# Patient Record
Sex: FEMALE | Race: BLACK OR AFRICAN AMERICAN | NOT HISPANIC OR LATINO
[De-identification: names, ages, dates, MRNs, and addresses within clinical notes are randomized per-mention and may not be internally consistent; named-entity substitution may affect disease eponyms.]

---

## 2017-12-18 ENCOUNTER — RESULT REVIEW (OUTPATIENT)
Age: 77
End: 2017-12-18

## 2017-12-18 ENCOUNTER — INPATIENT (INPATIENT)
Facility: HOSPITAL | Age: 77
LOS: 0 days | Discharge: HOME | DRG: 252 | End: 2017-12-19
Attending: SURGERY | Admitting: SURGERY
Payer: MEDICARE

## 2017-12-18 VITALS
HEART RATE: 79 BPM | HEIGHT: 65 IN | TEMPERATURE: 96 F | OXYGEN SATURATION: 100 % | DIASTOLIC BLOOD PRESSURE: 74 MMHG | SYSTOLIC BLOOD PRESSURE: 160 MMHG | RESPIRATION RATE: 16 BRPM | WEIGHT: 168.87 LBS

## 2017-12-18 DIAGNOSIS — E87.5 HYPERKALEMIA: ICD-10-CM
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
## 2017-12-18 DIAGNOSIS — T82.590A OTHER MECHANICAL COMPLICATION OF SURGICALLY CREATED ARTERIOVENOUS FISTULA, INITIAL ENCOUNTER: ICD-10-CM

## 2017-12-18 DIAGNOSIS — E11.40 TYPE 2 DIABETES MELLITUS WITH DIABETIC NEUROPATHY, UNSPECIFIED: ICD-10-CM

## 2017-12-18 DIAGNOSIS — D62 ACUTE POSTHEMORRHAGIC ANEMIA: ICD-10-CM

## 2017-12-18 DIAGNOSIS — N18.6 END STAGE RENAL DISEASE: ICD-10-CM

## 2017-12-18 DIAGNOSIS — E78.5 HYPERLIPIDEMIA, UNSPECIFIED: ICD-10-CM

## 2017-12-18 DIAGNOSIS — E11.22 TYPE 2 DIABETES MELLITUS WITH DIABETIC CHRONIC KIDNEY DISEASE: ICD-10-CM

## 2017-12-18 DIAGNOSIS — I12.0 HYPERTENSIVE CHRONIC KIDNEY DISEASE WITH STAGE 5 CHRONIC KIDNEY DISEASE OR END STAGE RENAL DISEASE: ICD-10-CM
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
## 2017-12-18 DIAGNOSIS — Z41.9 ENCOUNTER FOR PROCEDURE FOR PURPOSES OTHER THAN REMEDYING HEALTH STATE, UNSPECIFIED: Chronic | ICD-10-CM

## 2017-12-18 LAB
BASE EXCESS BLDA CALC-SCNC: -1.9 MMOL/L — SIGNIFICANT CHANGE UP (ref -2–3)
CA-I BLDA-SCNC: 0.92 MMOL/L — LOW (ref 1.12–1.3)
COHGB MFR BLDA: 0.8 % — SIGNIFICANT CHANGE UP
GAS PNL BLDA: SIGNIFICANT CHANGE UP
GLUCOSE BLDC GLUCOMTR-MCNC: 254 MG/DL — HIGH (ref 70–99)
GLUCOSE BLDC GLUCOMTR-MCNC: 292 MG/DL — HIGH (ref 70–99)
GLUCOSE BLDC GLUCOMTR-MCNC: 314 MG/DL — HIGH (ref 70–99)
HCO3 BLDA-SCNC: 23 MMOL/L — SIGNIFICANT CHANGE UP (ref 21–28)
HGB BLDA-MCNC: 8.8 G/DL — LOW (ref 11.5–15.5)
METHGB MFR BLDA: 0.2 % — SIGNIFICANT CHANGE UP
O2 CT VFR BLDA CALC: SIGNIFICANT CHANGE UP (ref 15–23)
OXYHGB MFR BLDA: 70 % — LOW (ref 94–100)
PCO2 BLDA: 42 MMHG — SIGNIFICANT CHANGE UP (ref 32–45)
PH BLDA: 7.36 — SIGNIFICANT CHANGE UP (ref 7.35–7.45)
PO2 BLDA: 42 MMHG — CRITICAL LOW (ref 83–108)
POTASSIUM BLDA-SCNC: 4.5 MMOL/L — SIGNIFICANT CHANGE UP (ref 3.5–4.9)
POTASSIUM BLDV-SCNC: 4.8 MMOL/L — SIGNIFICANT CHANGE UP (ref 3.5–4.9)
SAO2 % BLDA: 70 % — LOW (ref 95–100)
SODIUM BLDA-SCNC: 135 MMOL/L — LOW (ref 138–146)

## 2017-12-18 RX ORDER — PREGABALIN 225 MG/1
1000 CAPSULE ORAL DAILY
Qty: 0 | Refills: 0 | Status: DISCONTINUED | OUTPATIENT
Start: 2017-12-18 | End: 2017-12-19

## 2017-12-18 RX ORDER — CLOPIDOGREL BISULFATE 75 MG/1
75 TABLET, FILM COATED ORAL DAILY
Qty: 0 | Refills: 0 | Status: DISCONTINUED | OUTPATIENT
Start: 2017-12-18 | End: 2017-12-19

## 2017-12-18 RX ORDER — INSULIN GLARGINE 100 [IU]/ML
20 INJECTION, SOLUTION SUBCUTANEOUS AT BEDTIME
Qty: 0 | Refills: 0 | Status: DISCONTINUED | OUTPATIENT
Start: 2017-12-18 | End: 2017-12-19

## 2017-12-18 RX ORDER — INSULIN LISPRO 100/ML
VIAL (ML) SUBCUTANEOUS
Qty: 0 | Refills: 0 | Status: DISCONTINUED | OUTPATIENT
Start: 2017-12-18 | End: 2017-12-19

## 2017-12-18 RX ORDER — CARVEDILOL PHOSPHATE 80 MG/1
6.25 CAPSULE, EXTENDED RELEASE ORAL EVERY 12 HOURS
Qty: 0 | Refills: 0 | Status: DISCONTINUED | OUTPATIENT
Start: 2017-12-18 | End: 2017-12-19

## 2017-12-18 RX ORDER — SODIUM CHLORIDE 9 MG/ML
1000 INJECTION, SOLUTION INTRAVENOUS
Qty: 0 | Refills: 0 | Status: DISCONTINUED | OUTPATIENT
Start: 2017-12-18 | End: 2017-12-19

## 2017-12-18 RX ORDER — THIAMINE MONONITRATE (VIT B1) 100 MG
100 TABLET ORAL DAILY
Qty: 0 | Refills: 0 | Status: DISCONTINUED | OUTPATIENT
Start: 2017-12-18 | End: 2017-12-19

## 2017-12-18 RX ORDER — INSULIN LISPRO 100/ML
6 VIAL (ML) SUBCUTANEOUS ONCE
Qty: 0 | Refills: 0 | Status: COMPLETED | OUTPATIENT
Start: 2017-12-18 | End: 2017-12-18

## 2017-12-18 RX ORDER — DOCUSATE SODIUM 100 MG
100 CAPSULE ORAL THREE TIMES A DAY
Qty: 0 | Refills: 0 | Status: DISCONTINUED | OUTPATIENT
Start: 2017-12-18 | End: 2017-12-19

## 2017-12-18 RX ORDER — GLUCAGON INJECTION, SOLUTION 0.5 MG/.1ML
1 INJECTION, SOLUTION SUBCUTANEOUS ONCE
Qty: 0 | Refills: 0 | Status: DISCONTINUED | OUTPATIENT
Start: 2017-12-18 | End: 2017-12-19

## 2017-12-18 RX ORDER — PARICALCITOL 2 UG/1
4 CAPSULE, GELATIN COATED ORAL
Qty: 0 | Refills: 0 | Status: DISCONTINUED | OUTPATIENT
Start: 2017-12-18 | End: 2017-12-19

## 2017-12-18 RX ORDER — OXYCODONE AND ACETAMINOPHEN 5; 325 MG/1; MG/1
1 TABLET ORAL EVERY 6 HOURS
Qty: 0 | Refills: 0 | Status: DISCONTINUED | OUTPATIENT
Start: 2017-12-18 | End: 2017-12-19

## 2017-12-18 RX ORDER — ACETAMINOPHEN 500 MG
650 TABLET ORAL EVERY 6 HOURS
Qty: 0 | Refills: 0 | Status: DISCONTINUED | OUTPATIENT
Start: 2017-12-18 | End: 2017-12-19

## 2017-12-18 RX ORDER — DEXTROSE 50 % IN WATER 50 %
1 SYRINGE (ML) INTRAVENOUS ONCE
Qty: 0 | Refills: 0 | Status: DISCONTINUED | OUTPATIENT
Start: 2017-12-18 | End: 2017-12-19

## 2017-12-18 RX ORDER — SEVELAMER CARBONATE 2400 MG/1
1600 POWDER, FOR SUSPENSION ORAL
Qty: 0 | Refills: 0 | Status: DISCONTINUED | OUTPATIENT
Start: 2017-12-18 | End: 2017-12-19

## 2017-12-18 RX ORDER — ONDANSETRON 8 MG/1
4 TABLET, FILM COATED ORAL EVERY 6 HOURS
Qty: 0 | Refills: 0 | Status: DISCONTINUED | OUTPATIENT
Start: 2017-12-18 | End: 2017-12-19

## 2017-12-18 RX ORDER — CINACALCET 30 MG/1
30 TABLET, FILM COATED ORAL DAILY
Qty: 0 | Refills: 0 | Status: DISCONTINUED | OUTPATIENT
Start: 2017-12-18 | End: 2017-12-19

## 2017-12-18 RX ORDER — DEXTROSE 50 % IN WATER 50 %
12.5 SYRINGE (ML) INTRAVENOUS ONCE
Qty: 0 | Refills: 0 | Status: DISCONTINUED | OUTPATIENT
Start: 2017-12-18 | End: 2017-12-19

## 2017-12-18 RX ADMIN — Medication 6 UNIT(S): at 16:36

## 2017-12-18 NOTE — H&P ADULT - HISTORY OF PRESENT ILLNESS
ff 76 yo F with ESRD on HD (MWF), CAD s/p PCI+stent (year unknown), HTN, and DMT2 presents for revision of LUE AV graft. Currently, no fevers, no nausea, no pre-syncope.

## 2017-12-18 NOTE — H&P ADULT - ASSESSMENT
78 yo F s/p placement of looped LUE AV hybrid graft and explantation of degenerated old AV graft.    Intra-op, lost 1 L blood, got 2 U PRBC with 2 L crystalloid. Post-op, admitted to [facility] surg for hemodynamic monitoring.    Plan:  - admit to vas surg, tele, Dr. Triplett  - STAT CBC and EKG in PACU  - pain control  - home meds  - diabetic renal diet  - c/s renal in AM for possible HD  - IV Vanco with HD x 2 weeks  - ISS, Lantus  - no hep w/ HD for 2 weeks, okay for SQH, no SCDs, get out of bed/ambulate  - AM labs

## 2017-12-18 NOTE — ASU PATIENT PROFILE, ADULT - PSH
History of Cholecystectomy  in 1990  left AV graft revision  x 3 times , last one 2010    Surgery, elective  mastectomy Left breast with lymph node removal

## 2017-12-18 NOTE — BRIEF OPERATIVE NOTE - OPERATION/FINDINGS
Explant of left arm degenerated brachiocephalic AV graft. Placement of looped upper arm graft (tunneled) from original fistula to proximal old graft using [place] Hybrid vascular graft.

## 2017-12-18 NOTE — ASU PATIENT PROFILE, ADULT - PMH
Breast Cancer  left breast  CAD (Coronary Artery Disease)  s/p drug eluting stent insertion in may 2010  CHF (Congestive Heart Failure)  in the past  DM (Diabetes Mellitus)  pt on insulin  ESRD on Dialysis  monday / wedensday/friday  HTN (hypertension)    Pagets Disease, Breast  left breast

## 2017-12-19 ENCOUNTER — TRANSCRIPTION ENCOUNTER (OUTPATIENT)
Age: 77
End: 2017-12-19

## 2017-12-19 VITALS
TEMPERATURE: 98 F | DIASTOLIC BLOOD PRESSURE: 59 MMHG | OXYGEN SATURATION: 99 % | HEART RATE: 72 BPM | RESPIRATION RATE: 17 BRPM | SYSTOLIC BLOOD PRESSURE: 118 MMHG | WEIGHT: 169.54 LBS

## 2017-12-19 DIAGNOSIS — N18.9 CHRONIC KIDNEY DISEASE, UNSPECIFIED: ICD-10-CM

## 2017-12-19 DIAGNOSIS — I10 ESSENTIAL (PRIMARY) HYPERTENSION: ICD-10-CM

## 2017-12-19 DIAGNOSIS — N18.6 END STAGE RENAL DISEASE: ICD-10-CM

## 2017-12-19 DIAGNOSIS — Z98.890 OTHER SPECIFIED POSTPROCEDURAL STATES: ICD-10-CM

## 2017-12-19 LAB
ANION GAP SERPL CALC-SCNC: 14 MMOL/L — SIGNIFICANT CHANGE UP (ref 5–17)
ANION GAP SERPL CALC-SCNC: 15 MMOL/L — SIGNIFICANT CHANGE UP (ref 5–17)
APTT BLD: 27.9 SEC — SIGNIFICANT CHANGE UP (ref 27.5–37.4)
BUN SERPL-MCNC: 44 MG/DL — HIGH (ref 7–23)
BUN SERPL-MCNC: 48 MG/DL — HIGH (ref 7–23)
CALCIUM SERPL-MCNC: 8.3 MG/DL — LOW (ref 8.4–10.5)
CALCIUM SERPL-MCNC: 8.9 MG/DL — SIGNIFICANT CHANGE UP (ref 8.4–10.5)
CHLORIDE SERPL-SCNC: 100 MMOL/L — SIGNIFICANT CHANGE UP (ref 96–108)
CHLORIDE SERPL-SCNC: 100 MMOL/L — SIGNIFICANT CHANGE UP (ref 96–108)
CO2 SERPL-SCNC: 21 MMOL/L — LOW (ref 22–31)
CO2 SERPL-SCNC: 22 MMOL/L — SIGNIFICANT CHANGE UP (ref 22–31)
CREAT SERPL-MCNC: 5.18 MG/DL — HIGH (ref 0.5–1.3)
CREAT SERPL-MCNC: 5.51 MG/DL — HIGH (ref 0.5–1.3)
GLUCOSE BLDC GLUCOMTR-MCNC: 182 MG/DL — HIGH (ref 70–99)
GLUCOSE BLDC GLUCOMTR-MCNC: 241 MG/DL — HIGH (ref 70–99)
GLUCOSE BLDC GLUCOMTR-MCNC: 242 MG/DL — HIGH (ref 70–99)
GLUCOSE SERPL-MCNC: 217 MG/DL — HIGH (ref 70–99)
GLUCOSE SERPL-MCNC: 243 MG/DL — HIGH (ref 70–99)
GRAM STN FLD: SIGNIFICANT CHANGE UP
HBV SURFACE AB SER-ACNC: REACTIVE — SIGNIFICANT CHANGE UP
HBV SURFACE AG SER-ACNC: SIGNIFICANT CHANGE UP
HCT VFR BLD CALC: 31 % — LOW (ref 34.5–45)
HCT VFR BLD CALC: 33.7 % — LOW (ref 34.5–45)
HCV AB S/CO SERPL IA: 9.71 S/CO — SIGNIFICANT CHANGE UP
HCV AB SERPL-IMP: REACTIVE
HGB BLD-MCNC: 10.2 G/DL — LOW (ref 11.5–15.5)
HGB BLD-MCNC: 11.2 G/DL — LOW (ref 11.5–15.5)
INR BLD: 1.16 — SIGNIFICANT CHANGE UP (ref 0.88–1.16)
MAGNESIUM SERPL-MCNC: 2.2 MG/DL — SIGNIFICANT CHANGE UP (ref 1.6–2.6)
MCHC RBC-ENTMCNC: 32.9 G/DL — SIGNIFICANT CHANGE UP (ref 32–36)
MCHC RBC-ENTMCNC: 33.1 PG — SIGNIFICANT CHANGE UP (ref 27–34)
MCHC RBC-ENTMCNC: 33.1 PG — SIGNIFICANT CHANGE UP (ref 27–34)
MCHC RBC-ENTMCNC: 33.2 G/DL — SIGNIFICANT CHANGE UP (ref 32–36)
MCV RBC AUTO: 100.6 FL — HIGH (ref 80–100)
MCV RBC AUTO: 99.7 FL — SIGNIFICANT CHANGE UP (ref 80–100)
PHOSPHATE SERPL-MCNC: 7.1 MG/DL — HIGH (ref 2.5–4.5)
PLATELET # BLD AUTO: 105 K/UL — LOW (ref 150–400)
PLATELET # BLD AUTO: 109 K/UL — LOW (ref 150–400)
POTASSIUM SERPL-MCNC: 5.6 MMOL/L — HIGH (ref 3.5–5.3)
POTASSIUM SERPL-MCNC: 5.8 MMOL/L — HIGH (ref 3.5–5.3)
POTASSIUM SERPL-SCNC: 5.6 MMOL/L — HIGH (ref 3.5–5.3)
POTASSIUM SERPL-SCNC: 5.8 MMOL/L — HIGH (ref 3.5–5.3)
PROTHROM AB SERPL-ACNC: 12.9 SEC — HIGH (ref 9.8–12.7)
RBC # BLD: 3.08 M/UL — LOW (ref 3.8–5.2)
RBC # BLD: 3.38 M/UL — LOW (ref 3.8–5.2)
RBC # FLD: 16.2 % — SIGNIFICANT CHANGE UP (ref 10.3–16.9)
RBC # FLD: 17.6 % — HIGH (ref 10.3–16.9)
SODIUM SERPL-SCNC: 136 MMOL/L — SIGNIFICANT CHANGE UP (ref 135–145)
SODIUM SERPL-SCNC: 136 MMOL/L — SIGNIFICANT CHANGE UP (ref 135–145)
SPECIMEN SOURCE: SIGNIFICANT CHANGE UP
WBC # BLD: 10.7 K/UL — HIGH (ref 3.8–10.5)
WBC # BLD: 13.3 K/UL — HIGH (ref 3.8–10.5)
WBC # FLD AUTO: 10.7 K/UL — HIGH (ref 3.8–10.5)
WBC # FLD AUTO: 13.3 K/UL — HIGH (ref 3.8–10.5)

## 2017-12-19 PROCEDURE — 80048 BASIC METABOLIC PNL TOTAL CA: CPT

## 2017-12-19 PROCEDURE — 85730 THROMBOPLASTIN TIME PARTIAL: CPT

## 2017-12-19 PROCEDURE — C1725: CPT

## 2017-12-19 PROCEDURE — 85027 COMPLETE CBC AUTOMATED: CPT

## 2017-12-19 PROCEDURE — 86901 BLOOD TYPING SEROLOGIC RH(D): CPT

## 2017-12-19 PROCEDURE — 86900 BLOOD TYPING SEROLOGIC ABO: CPT

## 2017-12-19 PROCEDURE — 86803 HEPATITIS C AB TEST: CPT

## 2017-12-19 PROCEDURE — 84295 ASSAY OF SERUM SODIUM: CPT

## 2017-12-19 PROCEDURE — 82962 GLUCOSE BLOOD TEST: CPT

## 2017-12-19 PROCEDURE — 82330 ASSAY OF CALCIUM: CPT

## 2017-12-19 PROCEDURE — 87184 SC STD DISK METHOD PER PLATE: CPT

## 2017-12-19 PROCEDURE — 87521 HEPATITIS C PROBE&RVRS TRNSC: CPT

## 2017-12-19 PROCEDURE — 86923 COMPATIBILITY TEST ELECTRIC: CPT

## 2017-12-19 PROCEDURE — C1768: CPT

## 2017-12-19 PROCEDURE — 87340 HEPATITIS B SURFACE AG IA: CPT

## 2017-12-19 PROCEDURE — 36415 COLL VENOUS BLD VENIPUNCTURE: CPT

## 2017-12-19 PROCEDURE — 84100 ASSAY OF PHOSPHORUS: CPT

## 2017-12-19 PROCEDURE — 36430 TRANSFUSION BLD/BLD COMPNT: CPT

## 2017-12-19 PROCEDURE — 88305 TISSUE EXAM BY PATHOLOGIST: CPT

## 2017-12-19 PROCEDURE — 85610 PROTHROMBIN TIME: CPT

## 2017-12-19 PROCEDURE — 84132 ASSAY OF SERUM POTASSIUM: CPT

## 2017-12-19 PROCEDURE — 85018 HEMOGLOBIN: CPT

## 2017-12-19 PROCEDURE — 83735 ASSAY OF MAGNESIUM: CPT

## 2017-12-19 PROCEDURE — 87075 CULTR BACTERIA EXCEPT BLOOD: CPT

## 2017-12-19 PROCEDURE — 76000 FLUOROSCOPY <1 HR PHYS/QHP: CPT

## 2017-12-19 PROCEDURE — 87070 CULTURE OTHR SPECIMN AEROBIC: CPT

## 2017-12-19 PROCEDURE — P9016: CPT

## 2017-12-19 PROCEDURE — 90935 HEMODIALYSIS ONE EVALUATION: CPT | Mod: GC

## 2017-12-19 PROCEDURE — 86850 RBC ANTIBODY SCREEN: CPT

## 2017-12-19 PROCEDURE — 90935 HEMODIALYSIS ONE EVALUATION: CPT

## 2017-12-19 PROCEDURE — 87186 SC STD MICRODIL/AGAR DIL: CPT

## 2017-12-19 PROCEDURE — 86706 HEP B SURFACE ANTIBODY: CPT

## 2017-12-19 RX ORDER — ACETAMINOPHEN 500 MG
2 TABLET ORAL
Qty: 0 | Refills: 0 | DISCHARGE
Start: 2017-12-19

## 2017-12-19 RX ORDER — VANCOMYCIN HCL 1 G
1000 VIAL (EA) INTRAVENOUS ONCE
Qty: 0 | Refills: 0 | Status: COMPLETED | OUTPATIENT
Start: 2017-12-19 | End: 2017-12-19

## 2017-12-19 RX ORDER — HEPARIN SODIUM 5000 [USP'U]/ML
5000 INJECTION INTRAVENOUS; SUBCUTANEOUS EVERY 8 HOURS
Qty: 0 | Refills: 0 | Status: DISCONTINUED | OUTPATIENT
Start: 2017-12-19 | End: 2017-12-19

## 2017-12-19 RX ORDER — INSULIN GLARGINE 100 [IU]/ML
5 INJECTION, SOLUTION SUBCUTANEOUS ONCE
Qty: 0 | Refills: 0 | Status: DISCONTINUED | OUTPATIENT
Start: 2017-12-19 | End: 2017-12-19

## 2017-12-19 RX ORDER — INSULIN GLARGINE 100 [IU]/ML
5 INJECTION, SOLUTION SUBCUTANEOUS ONCE
Qty: 0 | Refills: 0 | Status: COMPLETED | OUTPATIENT
Start: 2017-12-19 | End: 2017-12-19

## 2017-12-19 RX ADMIN — Medication 100 MILLIGRAM(S): at 07:41

## 2017-12-19 RX ADMIN — HEPARIN SODIUM 5000 UNIT(S): 5000 INJECTION INTRAVENOUS; SUBCUTANEOUS at 07:41

## 2017-12-19 RX ADMIN — Medication: at 07:43

## 2017-12-19 RX ADMIN — Medication: at 12:25

## 2017-12-19 RX ADMIN — CLOPIDOGREL BISULFATE 75 MILLIGRAM(S): 75 TABLET, FILM COATED ORAL at 16:31

## 2017-12-19 RX ADMIN — PREGABALIN 1000 MICROGRAM(S): 225 CAPSULE ORAL at 16:30

## 2017-12-19 RX ADMIN — Medication: at 00:47

## 2017-12-19 RX ADMIN — Medication 1 TABLET(S): at 16:30

## 2017-12-19 RX ADMIN — CARVEDILOL PHOSPHATE 6.25 MILLIGRAM(S): 80 CAPSULE, EXTENDED RELEASE ORAL at 07:42

## 2017-12-19 RX ADMIN — Medication 250 MILLIGRAM(S): at 14:20

## 2017-12-19 RX ADMIN — SEVELAMER CARBONATE 1600 MILLIGRAM(S): 2400 POWDER, FOR SUSPENSION ORAL at 07:42

## 2017-12-19 RX ADMIN — INSULIN GLARGINE 5 UNIT(S): 100 INJECTION, SOLUTION SUBCUTANEOUS at 01:45

## 2017-12-19 RX ADMIN — Medication 50 MILLIGRAM(S): at 12:47

## 2017-12-19 RX ADMIN — SEVELAMER CARBONATE 800 MILLIGRAM(S): 2400 POWDER, FOR SUSPENSION ORAL at 16:32

## 2017-12-19 RX ADMIN — CINACALCET 30 MILLIGRAM(S): 30 TABLET, FILM COATED ORAL at 16:31

## 2017-12-19 NOTE — PROGRESS NOTE ADULT - SUBJECTIVE AND OBJECTIVE BOX
24hr Events:  O/N: OR for Explant of left arm degenerated brachiocephalic AV graft. Placement of looped upper arm graft (tunneled) from original fistula to proximal old graft, POC WNL    Assessment/Plan;  76 yo F s/p placement of looped LUE AV hybrid graft and explantation of degenerated old AV graft.    Intra-op, lost 1 L blood, got 2 U PRBC with 2 L crystalloid. Post-op, admitted to vasc surg for hemodynamic monitoring.    Plan:    - pain control  - home meds  - diabetic renal diet  - c/s renal in AM for possible HD  - IV Vanco with HD x 2 weeks  - ISS, Lantus  - no hep w/ HD for 2 weeks, okay for SQH, no SCDs, get out of bed/ambulate  - AM labs 24hr Events:  O/N: OR for Explant of left arm degenerated brachiocephalic AV graft. Placement of looped upper arm graft (tunneled) from original fistula to proximal old graft, POC WNL  S. No complaints.    carvedilol 6.25  clopidogrel Tablet 75  heparin  Injectable 5000      Allergies    IV dye  can not be used secondary to ESRD (Other)  penicillins (Swelling)    Intolerances        Vital Signs Last 24 Hrs  T(C): 36.5 (19 Dec 2017 05:36), Max: 36.6 (19 Dec 2017 02:00)  T(F): 97.7 (19 Dec 2017 05:36), Max: 97.9 (19 Dec 2017 02:00)  HR: 69 (19 Dec 2017 07:26) (66 - 79)  BP: 101/45 (19 Dec 2017 07:26) (92/40 - 160/74)  BP(mean): 54 (19 Dec 2017 05:00) (54 - 54)  RR: 16 (19 Dec 2017 07:26) (15 - 18)  SpO2: 99% (19 Dec 2017 07:26) (99% - 100%)    Physical Exam:  General: awake, alert, NAD  Pulmonary:  Cardiovascular:  Abdominal:  Extremities: Left upper arm dressings bloody but not oozing. + Thrill + bruit. Radial signal biphasic.  Pulses:   Right:                                                                           Left:  FEM [ ]2+ [ ]1+ [ ] doppler                                            FEM [ ]2+ [ ]1+ [ ] doppler    POP [ ]2+ [ ]1+ [ ] doppler                                            POP [ ]2+ [ ]1+ [ ] doppler    DP [ ]2+ [ ]1+ [ ] doppler                                               DP [ ]2+ [ ]1+ [ ] doppler  PT[ ]2+ [ ]1+ [ ] doppler                                                 PT [ ]2+ [ ]1+ [ ] doppler      LABS:                        10.2   10.7  )-----------( 105      ( 19 Dec 2017 06:08 )             31.0     12-19    136  |  100  |  48<H>  ----------------------------<  217<H>  5.8<H>   |  22  |  5.51<H>    Ca    8.3<L>      19 Dec 2017 06:08  Phos  7.1     12-19  Mg     2.2     12-19      PT/INR - ( 19 Dec 2017 00:37 )   PT: 12.9 sec;   INR: 1.16          PTT - ( 19 Dec 2017 00:37 )  PTT:27.9 sec      RADIOLOGY & ADDITIONAL TESTS:    Assessment/Plan;  76 yo F h/o ESRD -HD (MWF) s/p placement of looped LUE AV hybrid graft and explantation of degenerated old AV graft.    Intra-op, lost 1 L blood, got 2 U PRBC with 2 L crystalloid. Post-op, admitted to vasc surg for hemodynamic monitoring.    Plan:-   - f/u CBC s/p transfusion 2 units intraop for acute blood loss anemia.  - pain control  - home meds  - diabetic renal diet  - c/s renal in AM for possible HD  - IV Vanco with HD x 2 weeks  - ISS, Lantus  - no hep w/ HD for 2 weeks, okay for SQH, no SCDs, get out of bed/ambulate  - AM labs

## 2017-12-19 NOTE — DISCHARGE NOTE ADULT - PATIENT PORTAL LINK FT
“You can access the Follow[facility] Patient Portal, offered by [facility], by registering with the following website: [email or web address]”

## 2017-12-19 NOTE — CONSULT NOTE ADULT - ASSESSMENT
Patient is a 77F PMhx of ESRD on HD M/W/F, CAD s/p PCI in 2007, HTN, HLD, anemia of CKD, CKDMBD, DM2, DM neuropathy who presents for elective AVG revision with postoperative hyperkalemia due to pRBC infusion requiring additional HD prior to discharge.

## 2017-12-19 NOTE — DISCHARGE NOTE ADULT - MEDICATION SUMMARY - MEDICATIONS TO TAKE
I will START or STAY ON the medications listed below when I get home from the hospital:    predniSONE 50 mg oral tablet  -- 1 tab(s) by mouth once a day for CONTRAST ALLERGY  -- Indication: For Home medication    acetaminophen 325 mg oral tablet  -- 2 tab(s) by mouth every 6 hours, As needed, Moderate Pain (4 - 6)  -- Indication: For Pain    heparin 1 unit/mL-NaCl 0.225% intravenous solution  -- 2000 unit(s) intravenous 3 times a week after dialysis  -- Indication: For Flush    Lyrica 50 mg oral capsule  -- 1 cap(s) by mouth 2 times a day  -- Indication: For Home Medicatlion    Levemir  -- 20 unit(s) subcutaneous once a day (at bedtime)  -- Indication: For Diabetes    HumaLOG 100 units/mL subcutaneous solution  -- SLIDING SCALE  -- Indication: For Diabetes    Plavix 75 mg oral tablet  -- 1 tab(s) by mouth once a day  -- Indication: For Home Medicatlion    Coreg 6.25 mg oral tablet  -- 1 tab(s) by mouth 2 times a day  -- Indication: For Hypertension    Sensipar 30 mg oral tablet  -- 1 tab(s) by mouth once a day  -- Indication: For Home Medicatlion    Alpha Lipoic Acid 100 mg oral tablet  -- 5 tab(s) by mouth once a day  -- Indication: For Home Medicatlion    Renvela 800 mg oral tablet  -- 2 tab(s) by mouth 3 times a day (with meals)  -- Indication: For ESRD on Dialysis    Multiple Vitamins oral tablet  -- 1 tab(s) by mouth once a day  -- Indication: For Home Medicatlion    Zemplar 4 mcg oral capsule  -- 1 cap(s) by mouth 3 times a week after dialysis  -- Indication: For Home Medicatlion    thiamine 100 mg oral tablet  -- 1 tab(s) by mouth once a day  -- Indication: For Home Medicatlion    Vitamin D2 50,000 intl units (1.25 mg) oral capsule  -- 1 cap(s) by mouth once a day  -- Indication: For Home Medicatlion    Vitamin B12 1000 mcg oral tablet  -- 1 tab(s) by mouth once a day  -- Indication: For Home Medicatlion I will START or STAY ON the medications listed below when I get home from the hospital:    predniSONE 50 mg oral tablet  -- 1 tab(s) by mouth once a day for CONTRAST ALLERGY  -- Indication: For Home medication    acetaminophen 325 mg oral tablet  -- 2 tab(s) by mouth every 6 hours, As needed, Moderate Pain (4 - 6)  -- Indication: For Pain    oxyCODONE-acetaminophen 5 mg-325 mg oral tablet  -- 1 tab(s) by mouth every 6 hours, As Needed -for moderate pain     2 tabs by mouth greg 6 hours for severe pain. MDD:8  -- Caution federal law prohibits the transfer of this drug to any person other  than the person for whom it was prescribed.  May cause drowsiness.  Alcohol may intensify this effect.  Use care when operating dangerous machinery.  This prescription cannot be refilled.  This product contains acetaminophen.  Do not use  with any other product containing acetaminophen to prevent possible liver damage.  Using more of this medication than prescribed may cause serious breathing problems.    -- Indication: For Pailn    heparin 1 unit/mL-NaCl 0.225% intravenous solution  -- 2000 unit(s) intravenous 3 times a week after dialysis  -- Indication: For Flush    Lyrica 50 mg oral capsule  -- 1 cap(s) by mouth 2 times a day  -- Indication: For Home Medicatlion    Levemir  -- 20 unit(s) subcutaneous once a day (at bedtime)  -- Indication: For Diabetes    HumaLOG 100 units/mL subcutaneous solution  -- SLIDING SCALE  -- Indication: For Diabetes    Plavix 75 mg oral tablet  -- 1 tab(s) by mouth once a day  -- Indication: For Home Medicatlion    Coreg 6.25 mg oral tablet  -- 1 tab(s) by mouth 2 times a day  -- Indication: For Hypertension    Sensipar 30 mg oral tablet  -- 1 tab(s) by mouth once a day  -- Indication: For Home Medicatlion    Alpha Lipoic Acid 100 mg oral tablet  -- 5 tab(s) by mouth once a day  -- Indication: For Home Medicatlion    Renvela 800 mg oral tablet  -- 2 tab(s) by mouth 3 times a day (with meals)  -- Indication: For ESRD on Dialysis    Multiple Vitamins oral tablet  -- 1 tab(s) by mouth once a day  -- Indication: For Home Medicatlion    Zemplar 4 mcg oral capsule  -- 1 cap(s) by mouth 3 times a week after dialysis  -- Indication: For Home Medicatlion    thiamine 100 mg oral tablet  -- 1 tab(s) by mouth once a day  -- Indication: For Home Medicatlion    Vitamin D2 50,000 intl units (1.25 mg) oral capsule  -- 1 cap(s) by mouth once a day  -- Indication: For Home Medicatlion    Vitamin B12 1000 mcg oral tablet  -- 1 tab(s) by mouth once a day  -- Indication: For Home Medicatlion

## 2017-12-19 NOTE — DISCHARGE NOTE ADULT - CARE PLAN
Principal Discharge DX:	ESRD on Dialysis  Instructions for follow-up, activity and diet:	Follow up with Dr. Triplett in 1-2 weeks. Call the office at  [phone] to schedule your appointment. Instructions for follow-up, activity and diet: Please continue to eat renal diet as directed by your nephrologist.  Please resume all regular home medications unless specifically advised not to take a particular medication. Also, please take any new medications as prescribed.  Please get plenty of rest, continue to ambulate several times per day, and drink adequate amounts of fluids. Avoid lifting weights greater than 5-10 lbs until you follow-up with your surgeon, who will instruct you further regarding activity restrictions.  Avoid driving or operating heavy machinery while taking pain medications.  You may take tylenol or percocet every 6 hours for pain. Please keep track of how much Tylenol (acetaminophen) you are taking. Do not take more than 4,000 mg per day. Be aware that Percocet and Tylenol #3 has tylenol in it.   You may shower letting soap and water run over the effected area. Do not scrub. Pat dry with towel when finished. Principal Discharge DX:	ESRD on Dialysis  Goal:	Heal wounds.  Instructions for follow-up, activity and diet:	Activity: As tolerated. Wound Care: Clean wounds daily with peroxide. Cover with gauze and paper tape. Diet: Renal  Follow up with Dr Triplett in 1 - 2 weeks after discharge. Call office for appointment. Office: [phone]. Principal Discharge DX:	ESRD on Dialysis  Goal:	Heal wounds.  Instructions for follow-up, activity and diet:	Activity: As tolerated. Wound Care: Clean wounds daily with peroxide. Antibiotics (vancomycin 1gm) with dialysis to continue for 2weeks. Discussed with Dr Daniels at your dialysis center. Cover with gauze and paper tape. Diet: Renal  Follow up with Dr Triplett in 1 - 2 weeks after discharge. Call office for appointment. Office: [phone].

## 2017-12-19 NOTE — CONSULT NOTE ADULT - SUBJECTIVE AND OBJECTIVE BOX
Chief Complaint: Left arm AVG revision    Patient is a 77F PMhx of ESRD on HD M/W/F, CAD s/p PCI in 2007, HTN, HLD, anemia of CKD, CKDMBD, DM2, DM neuropathy who presents for elective AVG revision. The precise surgery is placement of looped LUE AV hybrid graft and explantation of degenerated old AV graft. Her last HD session was yesterday on 12/18/2017    A review of her renal history is that she has had CKD due to presumed DM for many years. In 2007, she underwent cardiac catheterization and then she reports she had to start HD for one session after cardiac catheterization. Further, she had a left brachiocephalic AV FISTULA placed at that time. But she never required initiation of HD until 2010. In 2010, she reports the AV FISTULA never matured properly and could not be used. Then Dr. Triplett had constructed an AVGRAFT at that time for use. She has been using the same left UE AVG up until now and is coming to have it revised.     Center: [facility] in [place]  Access: RIGHT PERMACATH (placed on 12/14/17 by Dr. Triplett in anticipation of upcoming AVG revision)  Vintage: 2010  Etiology: presumably DM  EDW: Patient reports 74kg    Intraoperatively, she had lost 1 L blood, got 2 U PRBC with 2 L crystalloid. Post-op, admitted to [facility] surg for hemodynamic monitoring.    In light of the the pRBC infusions, she now has a mild hyperkalemia 5.8 and the vascular team is requesting evaluation for HD to improve her serum potassium levels prior to discharge.     The patient herself feels well without dyspnea, chest pain, left arm pain, leg swelling.     PAST MEDICAL & SURGICAL HISTORY:  HTN (hypertension)  Pagets Disease, Breast: left breast  CHF (Congestive Heart Failure): in the past  Breast Cancer: left breast  DM (Diabetes Mellitus): pt on insulin  ESRD on Dialysis: monday / wedensday/friday  CAD (Coronary Artery Disease): s/p drug eluting stent insertion in may 2010  Surgery, elective: mastectomy Left breast with lymph node removal  left AV graft revision  x 3 times , last one 2010  History of Cholecystectomy: in 1990      MEDICATIONS  (STANDING):  carvedilol 6.25 milliGRAM(s) Oral every 12 hours  cinacalcet 30 milliGRAM(s) Oral daily  clopidogrel Tablet 75 milliGRAM(s) Oral daily  cyanocobalamin 1000 MICROGram(s) Oral daily  dextrose 5%. 1000 milliLiter(s) (50 mL/Hr) IV Continuous <Continuous>  dextrose 50% Injectable 12.5 Gram(s) IV Push once  docusate sodium 100 milliGRAM(s) Oral three times a day  heparin  Injectable 5000 Unit(s) SubCutaneous every 8 hours  insulin glargine Injectable (LANTUS) 20 Unit(s) SubCutaneous at bedtime  insulin lispro (HumaLOG) corrective regimen sliding scale   SubCutaneous Before meals and at bedtime  Nephro-clint 1 Tablet(s) Oral daily  paricalcitol 4 MICROGram(s) Oral <User Schedule>  pregabalin 50 milliGRAM(s) Oral two times a day  sevelamer hydrochloride 1600 milliGRAM(s) Oral three times a day with meals  thiamine 100 milliGRAM(s) Oral daily    MEDICATIONS  (PRN):  acetaminophen   Tablet. 650 milliGRAM(s) Oral every 6 hours PRN Moderate Pain (4 - 6)  dextrose Gel 1 Dose(s) Oral once PRN Blood Glucose LESS THAN 70 milliGRAM(s)/deciliter  glucagon  Injectable 1 milliGRAM(s) IntraMuscular once PRN Glucose LESS THAN 70 milligrams/deciliter  ondansetron Injectable 4 milliGRAM(s) IV Push every 6 hours PRN Nausea  oxyCODONE    5 mG/acetaminophen 325 mG 1 Tablet(s) Oral every 6 hours PRN Severe Pain (7 - 10)      Allergies    IV dye  can not be used secondary to ESRD (Other)  penicillins (Swelling)    Intolerances        SOCIAL HISTORY:    FAMILY HISTORY:  No pertinent family history in first degree relatives      T(C): , Max: 36.6 (12-19-17 @ 02:00)  T(F): , Max: 97.9 (12-19-17 @ 02:00)  HR: 72 (12-19-17 @ 10:00)  BP: 90/56 (12-19-17 @ 10:00)  BP(mean): 73 (12-19-17 @ 10:00)  RR: 13 (12-19-17 @ 10:00)  SpO2: 98% (12-19-17 @ 10:00)  Wt(kg): --    Height (cm): 165.1 (12-18 @ 14:23)  Weight (kg): 76.6 (12-18 @ 14:23)  BMI (kg/m2): 28.1 (12-18 @ 14:23)  BSA (m2): 1.84 (12-18 @ 14:23)      LABS:                        10.2   10.7  )-----------( 105      ( 19 Dec 2017 06:08 )             31.0     12-19    136  |  100  |  48<H>  ----------------------------<  217<H>  5.8<H>   |  22  |  5.51<H>    Ca    8.3<L>      19 Dec 2017 06:08  Phos  7.1     12-19  Mg     2.2     12-19        PT/INR - ( 19 Dec 2017 00:37 )   PT: 12.9 sec;   INR: 1.16          PTT - ( 19 Dec 2017 00:37 )  PTT:27.9 sec          RADIOLOGY & ADDITIONAL STUDIES:

## 2017-12-19 NOTE — DISCHARGE NOTE ADULT - HOSPITAL COURSE
HPI:  78 yo F with ESRD on HD (MWF), CAD s/p PCI+stent (year unknown), HTN, and DMT2 presents for revision of LUE AV graft. Currently, no fevers, no nausea, no pre-syncope. On 12/19 he underwent Explant of left arm degenerated brachiocephalic AV graft. Placement of looped upper arm graft (tunneled) from original fistula to proximal old graft using [place] Hybrid vascular graft. During the case he lost approximately 1 L of blood and was transfused 2 u PRBC. He was admitted overnight for hemodynamic monitoring and HD. He has remained afebrile, hemodynamically stable has received HD and is currently ready for discharge. HPI:  78 yo F with ESRD on HD (MWF), CAD s/p PCI+stent (year unknown), HTN, and DMT2 presents for revision of LUE AV graft. Currently, no fevers, no nausea, no pre-syncope. On 12/19 he underwent Explant of left arm degenerated brachiocephalic AV graft. Placement of looped upper arm graft (tunneled) from original fistula to proximal old graft using [place] Hybrid vascular graft. During the case he lost approximately 1 L of blood and was transfused 2 u PRBC. He was admitted overnight for hemodynamic monitoring and HD. He has remained afebrile, hemodynamically stable has received HD and is currently ready for discharge.  Continue vancomycin with HD for 2 weeks. Arranged with Dr Daniels at 's HD center.

## 2017-12-19 NOTE — DISCHARGE NOTE ADULT - CARE PROVIDER_API CALL
Abdirizak Triplett), Surgery; Vascular Surgery  [address] [facility]  [address]nd [place] [address]  Phone: [phone]  Fax: [phone]

## 2017-12-19 NOTE — CONSULT NOTE ADULT - PROBLEM SELECTOR RECOMMENDATION 3
Would hold her antihypertensives right now in setting of postoperative setting.  Hold Coreg 6.25mg POBID before HD

## 2017-12-19 NOTE — CONSULT NOTE ADULT - PROBLEM SELECTOR RECOMMENDATION 9
The patient has a low normal BP but this may be due to post-anesthesia vasodiltation. Her volume status is appropriate otherwise. No clinical uremic symptoms. Chemistries reviewed.    Although the patient may do well without HD today until tomorrow for her outpatient unit, the trajectory of the serum potassium is not currently known, especially with the hyperglycemia she is experiencing right now. Further, she is net positive since yesterday by about 1.6L (1L EBL + 2L LR and 600mL of pRBC).     Performing a short session of HD today will have tangible benefits for the patient.     Will arrange Optiflux 180 / 2K Bath /  /  / Duration 2 hours / UF goal 0kg for now but to be determined with live updated BP measurement and standing weight measurement in HD unit.    If BP remains low, then will perform clearance only.  If BP improves in HD Unit, then will tailor UF as indicated. See follow up on HD note later.

## 2017-12-19 NOTE — PROGRESS NOTE ADULT - SUBJECTIVE AND OBJECTIVE BOX
Patient was seen and evaluated on dialysis.   Patient is tolerating the procedure well.   HR: 75 (12-19-17 @ 13:20)  BP: 110/59 (12-19-17 @ 13:20)    Continue dialysis:   Dialyzer: Optiflux 180         QB: 400        QD: 500 2K bath   Goal UF 0.5 kg over 2 Hours     PreHD weight 77.7kg   She reports she last reached 76kg yesterday at her HD unit and that her DW is officially listed at 74kg    Given the low normal BP, presumably from residual anesthesia effects, will target net UF of 0.5kg for today.

## 2017-12-19 NOTE — PROGRESS NOTE ADULT - ATTENDING COMMENTS
seen and evaluated while on dialysis  tolerating the procedure well  continue full treatment as prescribed

## 2017-12-19 NOTE — DISCHARGE NOTE ADULT - PLAN OF CARE
Follow up with Dr. Triplett in 1-2 weeks. Call the office at  [phone] to schedule your appointment. Instructions for follow-up, activity and diet: Please continue to eat renal diet as directed by your nephrologist.  Please resume all regular home medications unless specifically advised not to take a particular medication. Also, please take any new medications as prescribed.  Please get plenty of rest, continue to ambulate several times per day, and drink adequate amounts of fluids. Avoid lifting weights greater than 5-10 lbs until you follow-up with your surgeon, who will instruct you further regarding activity restrictions.  Avoid driving or operating heavy machinery while taking pain medications.  You may take tylenol or percocet every 6 hours for pain. Please keep track of how much Tylenol (acetaminophen) you are taking. Do not take more than 4,000 mg per day. Be aware that Percocet and Tylenol #3 has tylenol in it.   You may shower letting soap and water run over the effected area. Do not scrub. Pat dry with towel when finished. Heal wounds. Activity: As tolerated. Wound Care: Clean wounds daily with peroxide. Cover with gauze and paper tape. Diet: Renal  Follow up with Dr Triplett in 1 - 2 weeks after discharge. Call office for appointment. Office: [phone]. Activity: As tolerated. Wound Care: Clean wounds daily with peroxide. Antibiotics (vancomycin 1gm) with dialysis to continue for 2weeks. Discussed with Dr Daniels at your dialysis center. Cover with gauze and paper tape. Diet: Renal  Follow up with Dr Triplett in 1 - 2 weeks after discharge. Call office for appointment. Office: [phone].

## 2017-12-19 NOTE — PROGRESS NOTE ADULT - SUBJECTIVE AND OBJECTIVE BOX
Procedure: Explant of left arm degenerated brachiocephalic AV graft. Placement of looped upper arm graft  Surgeon: Dr. Triplett    S: Pt has no complaints. Denies CP, SOB, dizziness, numbness, tingling, nausea or vomiting. Pain controlled with medication.    O:  T(C): 36.6 (12-19-17 @ 02:00), Max: 36.6 (12-19-17 @ 02:00)  T(F): 97.9 (12-19-17 @ 02:00), Max: 97.9 (12-19-17 @ 02:00)  HR: 69 (12-19-17 @ 02:00) (67 - 69)  BP: 120/45 (12-19-17 @ 02:00) (114/42 - 120/45)  RR: 16 (12-19-17 @ 02:00) (16 - 16)  SpO2: 100% (12-19-17 @ 02:00) (100% - 100%)  Wt(kg): --                        11.2   13.3  )-----------( 109      ( 19 Dec 2017 00:37 )             33.7     12-19    136  |  100  |  44<H>  ----------------------------<  243<H>  5.6<H>   |  21<L>  |  5.18<H>    Ca    8.9      19 Dec 2017 00:38    Gen: NAD, resting comfortably in bed  C/V: NSR  Pulm: Nonlabored breathing, no respiratory distress, CTA  Extrem: Left arm with dressing in place, dressing CDI, able to move left hand and finger, 4/5 left hand strength, left arm bruit present on auscultation    A/P: 77yFemale s/p above procedure  Diet: Renal diet  Pain/nausea control  DVT ppx: SQH   Home medication  Vancomycin with dialysis  No heparin with dialysis  F/u AM labs

## 2017-12-21 LAB
-  AMPICILLIN: SIGNIFICANT CHANGE UP
-  CLINDAMYCIN: SIGNIFICANT CHANGE UP
-  ERYTHROMYCIN: SIGNIFICANT CHANGE UP
-  PENICILLIN: SIGNIFICANT CHANGE UP
-  VANCOMYCIN: SIGNIFICANT CHANGE UP
METHOD TYPE: SIGNIFICANT CHANGE UP

## 2017-12-22 LAB
-  CEFAZOLIN: SIGNIFICANT CHANGE UP
-  CLINDAMYCIN: SIGNIFICANT CHANGE UP
-  ERYTHROMYCIN: SIGNIFICANT CHANGE UP
-  LINEZOLID: SIGNIFICANT CHANGE UP
-  OXACILLIN: SIGNIFICANT CHANGE UP
-  PENICILLIN: SIGNIFICANT CHANGE UP
-  RIFAMPIN: SIGNIFICANT CHANGE UP
-  TRIMETHOPRIM/SULFAMETHOXAZOLE: SIGNIFICANT CHANGE UP
-  VANCOMYCIN: SIGNIFICANT CHANGE UP
CULTURE RESULTS: SIGNIFICANT CHANGE UP
METHOD TYPE: SIGNIFICANT CHANGE UP
ORGANISM # SPEC MICROSCOPIC CNT: SIGNIFICANT CHANGE UP
SPECIMEN SOURCE: SIGNIFICANT CHANGE UP
SURGICAL PATHOLOGY STUDY: SIGNIFICANT CHANGE UP

## 2019-04-04 PROBLEM — I10 ESSENTIAL (PRIMARY) HYPERTENSION: Chronic | Status: ACTIVE | Noted: 2017-12-18

## 2019-04-30 ENCOUNTER — APPOINTMENT (OUTPATIENT)
Dept: SURGICAL ONCOLOGY | Facility: CLINIC | Age: 79
End: 2019-04-30
Payer: MEDICARE

## 2019-04-30 VITALS
HEIGHT: 67 IN | BODY MASS INDEX: 25.9 KG/M2 | WEIGHT: 165 LBS | OXYGEN SATURATION: 99 % | DIASTOLIC BLOOD PRESSURE: 80 MMHG | SYSTOLIC BLOOD PRESSURE: 130 MMHG | HEART RATE: 76 BPM

## 2019-04-30 DIAGNOSIS — N64.4 MASTODYNIA: ICD-10-CM

## 2019-04-30 DIAGNOSIS — C50.012 MALIGNANT NEOPLASM OF NIPPLE AND AREOLA, LEFT FEMALE BREAST: ICD-10-CM

## 2019-04-30 PROCEDURE — 99214 OFFICE O/P EST MOD 30 MIN: CPT

## 2019-04-30 NOTE — HISTORY OF PRESENT ILLNESS
[de-identified] : 79 y/o female patient presents for a follow up visit. She was last seen in 2016.\par Pt recently had pneumonia and nephrologist wants to see if there is any abnormalities on her LT axilla.\par \par \par \par Past Hx:\pham Gallagher is a 76 year old post menopausal female presents for initial consultation. 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 History also includes B/L benign breast biopsies. Denies personal or family history of breast or ovarian cancer. Denies past or current use of HRT. \par \par Today patient is with c/o occasional left breast discomfort which she describes as "pulling and pushing" for approximately 2 months now. Denies any palpable masses. Patient also states that she is experiencing left axillary fullness and left supraclavicular lymph node enlargement. She reports she underwent CT neck at [facility] in [place] which was unremarkable. She also states she underwent right mammogram in 2015 with unremarkable findings. Denies any palpable masses, nipple discharge, skin changes or inversion to right breast. Denies any recent colds or infections. \par \par PMH includes HTN, DM with neuropathy, nephropathy, retinopathy and neuropathy. She is currently on dialysis on Monday, Wednesday and Friday via left upper arm AV Fistula. \par \par Menarche age: 13\par LMP: unknown

## 2019-04-30 NOTE — PHYSICAL EXAM
[Normal] : supple, no neck mass and thyroid not enlarged [Normal Neck Lymph Nodes] : normal neck lymph nodes  [Normal Groin Lymph Nodes] : normal groin lymph nodes [Normal] : oriented to person, place and time, with appropriate affect [FreeTextEntry1] : I, Golden Shen was present for the physical exam\par \par \par \par  [de-identified] : Normal S1, S2. Regular rate and rhythm\par \par  [de-identified] : RT breast exam is normal. On LT chest wall no lumps and no axillary lymph nodes. [de-identified] : Clear breath sounds bilaterally, normal respiratory effort\par \par

## 2019-05-29 VITALS
OXYGEN SATURATION: 100 % | RESPIRATION RATE: 18 BRPM | HEART RATE: 69 BPM | WEIGHT: 166.01 LBS | TEMPERATURE: 96 F | SYSTOLIC BLOOD PRESSURE: 130 MMHG | HEIGHT: 66.5 IN | DIASTOLIC BLOOD PRESSURE: 63 MMHG

## 2019-05-29 NOTE — ASU PREOP CHECKLIST - 2.
BS 558mg/dl at 2;45pm, reported to Vascular team, BS 558mg/dl at 2;45pm, reported to Vascular team, Humalog 12units SQ given on her abdomen at 3;15pm according to Vascular team sliding scale

## 2019-05-30 ENCOUNTER — INPATIENT (INPATIENT)
Facility: HOSPITAL | Age: 79
LOS: 5 days | Discharge: HOME HEALTH | DRG: 252 | End: 2019-06-05
Attending: SURGERY | Admitting: SURGERY
Payer: MEDICARE

## 2019-05-30 ENCOUNTER — OUTPATIENT (OUTPATIENT)
Dept: OUTPATIENT SERVICES | Facility: HOSPITAL | Age: 79
LOS: 1 days | End: 2019-05-30
Payer: MEDICARE

## 2019-05-30 VITALS — WEIGHT: 166.45 LBS

## 2019-05-30 DIAGNOSIS — Z41.9 ENCOUNTER FOR PROCEDURE FOR PURPOSES OTHER THAN REMEDYING HEALTH STATE, UNSPECIFIED: Chronic | ICD-10-CM

## 2019-05-30 LAB
ANION GAP SERPL CALC-SCNC: 17 MMOL/L — SIGNIFICANT CHANGE UP (ref 5–17)
ANION GAP SERPL CALC-SCNC: 22 MMOL/L — HIGH (ref 5–17)
B-OH-BUTYR SERPL-SCNC: 1.4 MMOL/L — HIGH
BASOPHILS # BLD AUTO: 0 K/UL — SIGNIFICANT CHANGE UP (ref 0–0.2)
BASOPHILS NFR BLD AUTO: 0 % — SIGNIFICANT CHANGE UP (ref 0–2)
BLD GP AB SCN SERPL QL: NEGATIVE — SIGNIFICANT CHANGE UP
BUN SERPL-MCNC: 51 MG/DL — HIGH (ref 7–23)
BUN SERPL-MCNC: 57 MG/DL — HIGH (ref 7–23)
CALCIUM SERPL-MCNC: 8.2 MG/DL — LOW (ref 8.4–10.5)
CALCIUM SERPL-MCNC: 8.2 MG/DL — LOW (ref 8.4–10.5)
CHLORIDE SERPL-SCNC: 89 MMOL/L — LOW (ref 96–108)
CHLORIDE SERPL-SCNC: 91 MMOL/L — LOW (ref 96–108)
CO2 SERPL-SCNC: 19 MMOL/L — LOW (ref 22–31)
CO2 SERPL-SCNC: 23 MMOL/L — SIGNIFICANT CHANGE UP (ref 22–31)
CREAT SERPL-MCNC: 5.26 MG/DL — HIGH (ref 0.5–1.3)
CREAT SERPL-MCNC: 5.59 MG/DL — HIGH (ref 0.5–1.3)
EOSINOPHIL # BLD AUTO: 0 K/UL — SIGNIFICANT CHANGE UP (ref 0–0.5)
EOSINOPHIL NFR BLD AUTO: 0 % — SIGNIFICANT CHANGE UP (ref 0–6)
EXTRA BLUE TOP TUBE: SIGNIFICANT CHANGE UP
GAS PNL BLDV: SIGNIFICANT CHANGE UP
GLUCOSE BLDC GLUCOMTR-MCNC: 469 MG/DL — CRITICAL HIGH (ref 70–99)
GLUCOSE BLDC GLUCOMTR-MCNC: 492 MG/DL — CRITICAL HIGH (ref 70–99)
GLUCOSE BLDC GLUCOMTR-MCNC: 558 MG/DL — CRITICAL HIGH (ref 70–99)
GLUCOSE BLDC GLUCOMTR-MCNC: 560 MG/DL — CRITICAL HIGH (ref 70–99)
GLUCOSE BLDC GLUCOMTR-MCNC: 571 MG/DL — CRITICAL HIGH (ref 70–99)
GLUCOSE BLDC GLUCOMTR-MCNC: 579 MG/DL — CRITICAL HIGH (ref 70–99)
GLUCOSE SERPL-MCNC: 572 MG/DL — CRITICAL HIGH (ref 70–99)
GLUCOSE SERPL-MCNC: 631 MG/DL — CRITICAL HIGH (ref 70–99)
HCT VFR BLD CALC: 29.2 % — LOW (ref 34.5–45)
HGB BLD-MCNC: 8.9 G/DL — LOW (ref 11.5–15.5)
IMM GRANULOCYTES NFR BLD AUTO: 0.6 % — SIGNIFICANT CHANGE UP (ref 0–1.5)
LYMPHOCYTES # BLD AUTO: 0.33 K/UL — LOW (ref 1–3.3)
LYMPHOCYTES # BLD AUTO: 4.6 % — LOW (ref 13–44)
MCHC RBC-ENTMCNC: 30.5 GM/DL — LOW (ref 32–36)
MCHC RBC-ENTMCNC: 30.6 PG — SIGNIFICANT CHANGE UP (ref 27–34)
MCV RBC AUTO: 100.3 FL — HIGH (ref 80–100)
MONOCYTES # BLD AUTO: 0.44 K/UL — SIGNIFICANT CHANGE UP (ref 0–0.9)
MONOCYTES NFR BLD AUTO: 6.2 % — SIGNIFICANT CHANGE UP (ref 2–14)
NEUTROPHILS # BLD AUTO: 6.31 K/UL — SIGNIFICANT CHANGE UP (ref 1.8–7.4)
NEUTROPHILS NFR BLD AUTO: 88.6 % — HIGH (ref 43–77)
NRBC # BLD: 0 /100 WBCS — SIGNIFICANT CHANGE UP (ref 0–0)
PLATELET # BLD AUTO: 83 K/UL — LOW (ref 150–400)
POTASSIUM BLDV-SCNC: 5 MMOL/L — HIGH (ref 3.5–4.9)
POTASSIUM SERPL-MCNC: 4.6 MMOL/L — SIGNIFICANT CHANGE UP (ref 3.5–5.3)
POTASSIUM SERPL-MCNC: 5.1 MMOL/L — SIGNIFICANT CHANGE UP (ref 3.5–5.3)
POTASSIUM SERPL-SCNC: 4.6 MMOL/L — SIGNIFICANT CHANGE UP (ref 3.5–5.3)
POTASSIUM SERPL-SCNC: 5.1 MMOL/L — SIGNIFICANT CHANGE UP (ref 3.5–5.3)
RBC # BLD: 2.91 M/UL — LOW (ref 3.8–5.2)
RBC # FLD: 14.4 % — SIGNIFICANT CHANGE UP (ref 10.3–14.5)
RH IG SCN BLD-IMP: POSITIVE — SIGNIFICANT CHANGE UP
SODIUM SERPL-SCNC: 130 MMOL/L — LOW (ref 135–145)
SODIUM SERPL-SCNC: 131 MMOL/L — LOW (ref 135–145)
WBC # BLD: 7.12 K/UL — SIGNIFICANT CHANGE UP (ref 3.8–10.5)
WBC # FLD AUTO: 7.12 K/UL — SIGNIFICANT CHANGE UP (ref 3.8–10.5)

## 2019-05-30 PROCEDURE — C1757: CPT

## 2019-05-30 PROCEDURE — 82962 GLUCOSE BLOOD TEST: CPT

## 2019-05-30 PROCEDURE — 80048 BASIC METABOLIC PNL TOTAL CA: CPT

## 2019-05-30 PROCEDURE — 86901 BLOOD TYPING SEROLOGIC RH(D): CPT

## 2019-05-30 PROCEDURE — 86850 RBC ANTIBODY SCREEN: CPT

## 2019-05-30 PROCEDURE — 84132 ASSAY OF SERUM POTASSIUM: CPT

## 2019-05-30 PROCEDURE — 99285 EMERGENCY DEPT VISIT HI MDM: CPT

## 2019-05-30 PROCEDURE — 36415 COLL VENOUS BLD VENIPUNCTURE: CPT

## 2019-05-30 PROCEDURE — 86900 BLOOD TYPING SEROLOGIC ABO: CPT

## 2019-05-30 RX ORDER — INSULIN HUMAN 100 [IU]/ML
4 INJECTION, SOLUTION SUBCUTANEOUS
Qty: 50 | Refills: 0 | Status: DISCONTINUED | OUTPATIENT
Start: 2019-05-30 | End: 2019-05-30

## 2019-05-30 RX ORDER — SODIUM CHLORIDE 9 MG/ML
1000 INJECTION, SOLUTION INTRAVENOUS
Refills: 0 | Status: DISCONTINUED | OUTPATIENT
Start: 2019-05-30 | End: 2019-05-30

## 2019-05-30 RX ORDER — GLUCAGON INJECTION, SOLUTION 0.5 MG/.1ML
1 INJECTION, SOLUTION SUBCUTANEOUS ONCE
Refills: 0 | Status: DISCONTINUED | OUTPATIENT
Start: 2019-05-30 | End: 2019-05-30

## 2019-05-30 RX ORDER — INSULIN DETEMIR 100/ML (3)
12 INSULIN PEN (ML) SUBCUTANEOUS ONCE
Refills: 0 | Status: COMPLETED | OUTPATIENT
Start: 2019-05-30 | End: 2019-05-30

## 2019-05-30 RX ORDER — DEXTROSE 50 % IN WATER 50 %
50 SYRINGE (ML) INTRAVENOUS
Refills: 0 | Status: DISCONTINUED | OUTPATIENT
Start: 2019-05-30 | End: 2019-05-30

## 2019-05-30 RX ORDER — INSULIN LISPRO 100/ML
4 VIAL (ML) SUBCUTANEOUS ONCE
Refills: 0 | Status: COMPLETED | OUTPATIENT
Start: 2019-05-30 | End: 2019-05-30

## 2019-05-30 RX ORDER — NIFEDIPINE 30 MG
60 TABLET, EXTENDED RELEASE 24 HR ORAL DAILY
Refills: 0 | Status: DISCONTINUED | OUTPATIENT
Start: 2019-05-30 | End: 2019-05-31

## 2019-05-30 RX ORDER — DEXTROSE 50 % IN WATER 50 %
15 SYRINGE (ML) INTRAVENOUS ONCE
Refills: 0 | Status: DISCONTINUED | OUTPATIENT
Start: 2019-05-30 | End: 2019-05-30

## 2019-05-30 RX ORDER — INSULIN LISPRO 100/ML
0 VIAL (ML) SUBCUTANEOUS
Qty: 0 | Refills: 0 | DISCHARGE

## 2019-05-30 RX ORDER — DEXTROSE 50 % IN WATER 50 %
12.5 SYRINGE (ML) INTRAVENOUS ONCE
Refills: 0 | Status: DISCONTINUED | OUTPATIENT
Start: 2019-05-30 | End: 2019-05-30

## 2019-05-30 RX ORDER — CLOPIDOGREL BISULFATE 75 MG/1
1 TABLET, FILM COATED ORAL
Qty: 0 | Refills: 0 | DISCHARGE

## 2019-05-30 RX ORDER — DEXTROSE 50 % IN WATER 50 %
25 SYRINGE (ML) INTRAVENOUS ONCE
Refills: 0 | Status: DISCONTINUED | OUTPATIENT
Start: 2019-05-30 | End: 2019-05-30

## 2019-05-30 RX ORDER — HEPARIN SODIUM 5000 [USP'U]/ML
5000 INJECTION INTRAVENOUS; SUBCUTANEOUS EVERY 8 HOURS
Refills: 0 | Status: DISCONTINUED | OUTPATIENT
Start: 2019-05-30 | End: 2019-05-31

## 2019-05-30 RX ORDER — ACETAMINOPHEN 500 MG
650 TABLET ORAL EVERY 6 HOURS
Refills: 0 | Status: DISCONTINUED | OUTPATIENT
Start: 2019-05-30 | End: 2019-05-31

## 2019-05-30 RX ORDER — METOPROLOL TARTRATE 50 MG
75 TABLET ORAL
Refills: 0 | Status: DISCONTINUED | OUTPATIENT
Start: 2019-05-30 | End: 2019-05-31

## 2019-05-30 RX ORDER — INSULIN LISPRO 100/ML
VIAL (ML) SUBCUTANEOUS
Refills: 0 | Status: DISCONTINUED | OUTPATIENT
Start: 2019-05-30 | End: 2019-05-30

## 2019-05-30 RX ORDER — INSULIN LISPRO 100/ML
10 VIAL (ML) SUBCUTANEOUS ONCE
Refills: 0 | Status: COMPLETED | OUTPATIENT
Start: 2019-05-30 | End: 2019-05-30

## 2019-05-30 RX ORDER — INSULIN LISPRO 100/ML
VIAL (ML) SUBCUTANEOUS EVERY 4 HOURS
Refills: 0 | Status: DISCONTINUED | OUTPATIENT
Start: 2019-05-30 | End: 2019-05-31

## 2019-05-30 RX ORDER — PANTOPRAZOLE SODIUM 20 MG/1
40 TABLET, DELAYED RELEASE ORAL
Refills: 0 | Status: DISCONTINUED | OUTPATIENT
Start: 2019-05-30 | End: 2019-05-31

## 2019-05-30 RX ORDER — SEVELAMER CARBONATE 2400 MG/1
1600 POWDER, FOR SUSPENSION ORAL
Refills: 0 | Status: DISCONTINUED | OUTPATIENT
Start: 2019-05-30 | End: 2019-05-31

## 2019-05-30 RX ORDER — DEXTROSE 50 % IN WATER 50 %
25 SYRINGE (ML) INTRAVENOUS
Refills: 0 | Status: DISCONTINUED | OUTPATIENT
Start: 2019-05-30 | End: 2019-05-30

## 2019-05-30 RX ORDER — INSULIN DETEMIR 100/ML (3)
20 INSULIN PEN (ML) SUBCUTANEOUS
Qty: 0 | Refills: 0 | DISCHARGE

## 2019-05-30 RX ORDER — CINACALCET 30 MG/1
30 TABLET, FILM COATED ORAL DAILY
Refills: 0 | Status: DISCONTINUED | OUTPATIENT
Start: 2019-05-30 | End: 2019-05-31

## 2019-05-30 RX ADMIN — INSULIN HUMAN 4 UNIT(S)/HR: 100 INJECTION, SOLUTION SUBCUTANEOUS at 17:24

## 2019-05-30 RX ADMIN — Medication 12: at 23:22

## 2019-05-30 RX ADMIN — Medication 12 UNIT(S): at 20:52

## 2019-05-30 RX ADMIN — Medication 4 UNIT(S): at 20:52

## 2019-05-30 RX ADMIN — Medication 12: at 15:15

## 2019-05-30 RX ADMIN — Medication 10 UNIT(S): at 16:15

## 2019-05-30 NOTE — PROGRESS NOTE ADULT - SUBJECTIVE AND OBJECTIVE BOX
PRE-OP NOTE, Vascular Surgery, PCN:     Pre-op Diagnosis: HYPERGLYCEMIA  Hyperglycemia    Procedure: thrombectomy of LUE AVF   Surgeon: Dr. Triplett                          8.9    7.12  )-----------( 83       ( 30 May 2019 18:02 )             29.2     05-30    131<L>  |  91<L>  |  57<H>  ----------------------------<  572<HH>  4.6   |  23  |  5.59<H>    Ca    8.2<L>      30 May 2019 18:02          CAPILLARY BLOOD GLUCOSE      POCT Blood Glucose.: 469 mg/dL (30 May 2019 20:47)  POCT Blood Glucose.: 505 mg/dL (30 May 2019 19:11)  POCT Blood Glucose.: 524 mg/dL (30 May 2019 17:50)  POCT Blood Glucose.: 560 mg/dL (30 May 2019 15:47)  POCT Blood Glucose.: 571 mg/dL (30 May 2019 15:42)  POCT Blood Glucose.: 558 mg/dL (30 May 2019 14:45)  POCT Blood Glucose.: 579 mg/dL (30 May 2019 14:33)        Type & Screen:ABO Interpretation: A (05-30 @ 14:14)    CXR: wnl  EKG: wnl, in chart  cardiology clearance obtained pre op        A/P: 78y Female pre-op for above procedure  -NPO past midnight  -2U PRBC on hold  - 2 AM labs  - consent: in chart

## 2019-05-30 NOTE — ED ADULT TRIAGE NOTE - CHIEF COMPLAINT QUOTE
as per preop nurse patient is hyperglycemic over 500 even after 12 units of lispro. Pt was started on insulin drip in preop and stopped when arrived to ER, pending ER attending orders.

## 2019-05-30 NOTE — H&P ADULT - NSICDXPASTMEDICALHX_GEN_ALL_CORE_FT
PAST MEDICAL HISTORY:  Breast Cancer left breast    CAD (Coronary Artery Disease) s/p drug eluting stent insertion in may 2010    CHF (Congestive Heart Failure) in the past    DM (Diabetes Mellitus) pt on insulin    ESRD on Dialysis monday / wedensday/friday    HTN (hypertension)     Pagets Disease, Breast left breast

## 2019-05-30 NOTE — CHART NOTE - NSCHARTNOTEFT_GEN_A_CORE
78 year old female with Diabetes, ESRD presenting to ER with hyperglycemia in 500s. As per ER attending/note, patient was planned for a blocked AVF angio under vasuclar surgery but was perceived as having an allergy to contrast. SHe was therefore given 50mg of prednisone x 3 (last dose being taken by patient this morning). Procedure is now planned for tomorrow and she is being admitted under vascular surgery. She received as an outpatient lispro 12 units and then another 10 units lispro, unclear exactly what time each dose was given but 10 units lispro believed to have given around 4pm. Patient not currently acidotic or ketotic. POCT 505 at 7pm. ER attending noted that patient may eat dinner in ER but will be NPO after midnight. At home, she states she takes Levemir 10 units and Novolog SS for meal coverage.     At this time, due to possibility and concern of insulin stacking, wound give her levemir dose of 10 units tonight, give a 1 time stat dose of 4 units by 8 if patient will be eating dinner, check her blood sugar Q4h and treat with moderate lispro SS Q4h. Once blood sugar is down <200, wound then switch to low dose lispro SS.     Official consult to follow tomorrow.   Case d/w Dr. Pryor. ER attending Dr. Mcguire and Team 4 vascular surgery informed. 78 year old female with Diabetes, ESRD presenting to ER with hyperglycemia in 500s. As per ER attending/note, patient was planned for a blocked AVF angio under vasuclar surgery but was perceived as having an allergy to contrast. She was therefore given 50mg of prednisone x 3 (last dose being taken by patient this morning). Procedure is now planned for tomorrow and she is being admitted under vascular surgery. She received as an outpatient lispro 12 units and then another 10 units lispro, unclear exactly what time each dose was given but 10 units lispro believed to have given around 4pm. She was also on an insulin drip for about 30 minutes. Patient not currently acidotic or ketotic. POCT 505 at 7pm. ER attending noted that patient may eat dinner in ER but will be NPO after midnight. At home, she states she takes Levemir 10 units and Novolog SS for meal coverage.     At this time, due to possibility and concern of insulin stacking, wound give her levemir dose of 12 units tonight, give a 1 time stat dose of 4 units by 8 if patient will be eating dinner, check her blood sugar Q4h and treat with moderate lispro SS Q4h. Once blood sugar is down <200, wound then switch to low dose lispro SS.     Official consult to follow tomorrow.   Case d/w Dr. Pryor. ER attending Dr. Mcguire and Team 4 vascular surgery informed.

## 2019-05-30 NOTE — H&P ADULT - HISTORY OF PRESENT ILLNESS
77 yo F with HTN, IDDM, CAD s/p stent, breast ca s/p mastectomy, and ESRD on HD (MWF) presented this morning to same day surgery for elective left arm AV fistula revision (open thrombectomy with possible angioplasty/stent). Pre-op, pt had received 24 hr protocol for reported contrast allergy, which was prednisone 50 mg at 6 am yesterday, 6 pm yesterday, and 6 am today. On arrival to same day surgery, finger stick was 579. She received 12 U of lispro. Recheck was 571, for which she received another 10 U of lispro. Repeat was 524, so pt was transferred to ER. There, endocrine recommended levemir 12 U tonight and lispro 4 U with dinner, as well as Q4H FS with Q4H sliding scale.     The whole time, pt had no mental status changes, no blurry vision, no thirstiness, no abd pain, no syncope. Upon clarification, pt has never had anaphylaxis/rash/hives/laryngospasm to contrast. The issue it caused her was renal failure which eventually put her on dialysis. 77 yo F with HTN, IDDM, CAD s/p stent, breast ca s/p mastectomy, and ESRD on HD (MWF) presented this morning to same day surgery for elective left arm AV fistula revision (open thrombectomy with possible angioplasty/stent). Pre-op, pt had received 24 hr protocol for reported contrast allergy, which was prednisone 50 mg at 6 am yesterday, 6 pm yesterday, and 6 am today. On arrival to same day surgery, finger stick was 579. She received 12 U of lispro. Recheck was 571, for which she received another 10 U of lispro. Repeat was 524, so pt was transferred to ER. There, endocrine recommended levemir 12 U tonight and lispro 4 U with dinner, as well as Q4H FS with Q4H sliding scale.     The whole time, pt had no mental status changes, no blurry vision, no thirstiness, no abd pain, no syncope. Upon clarification, pt has never had anaphylaxis/rash/hives/laryngospasm to contrast. The issue it caused her was renal failure which eventually put her on dialysis.     LAST HD WED, 5/29/19.

## 2019-05-30 NOTE — H&P ADULT - ASSESSMENT
77 yo F with thrombosed LUE AVF, admitted for pre-op hyperglycemia management.    Plan:  - admit to vasc surg, tele, Dr. Triplett  - home meds, but hold Eliquis; no more need for steroids  - renal/diabetic diet now, NPO past midnight  - I/Os  - endocrine consult: levemir 12 tonight, lispro 4 tonight w/dinner, moderate sliding scale, Q4H finger sticks, change sliding scale to low once FS < 200  - SQH okay  - 2 am labs w/K  - NPO past midnight, pre-op (pt already consented) for OR tomorrow, 5/31, with Dr. Triplett (left arm AV fistula open thrombectomy with possible endovascular angioplasty/stent)

## 2019-05-30 NOTE — ED PROVIDER NOTE - PROGRESS NOTE DETAILS
D/w endocrinology Artitia [phone]. Give Levemir 12 units now D/w vascular surgery resident Dano - admit to [facility]. He will also touch base w/ endocrinology regarding recommendations. D/w endocrinology Artitia [phone]. Give Levemir 12 units now. If going to eat, give Lispro 4 units. Now would be a good time to eat. After that, check FS Q 4 hours. Order Moderate Lispro sliding scale every hours. D/w endocrinology Dr Betty Kwon [phone]: Give Levemir 12 units now. If going to eat, give Lispro 4 units (Now would be a good time to eat). After that, check FS Q 4 hours. Order Moderate Lispro sliding scale every 4 hours.

## 2019-05-30 NOTE — ED PROVIDER NOTE - CLINICAL SUMMARY MEDICAL DECISION MAKING FREE TEXT BOX
Pt presenting from outpt surgery w/ hyperglycemia s/p receiving 3 doses Prednisone 50 mg since yesterday. Pt already received 12 units of Lispro, followed by 10 units of Lispro, and started on insulin drip at 4 units per hour, prior to coming to the ED. Insulin drip stopped on arrival to the ED and initial . Pt not in DKA. Hyperglycemia prednisone induced. Unable to give IVF 2/2 ESRD on HD (last yesterday). Concern for continued boluses bottoming out pt's BS, especially in the setting of ESRD. Pt does not meet criteria for insulin drip and ICU admission. Will d/w endocrinology for tx recommendations. A/p vascular surgery, admit to their service. Surgery tonight canceled. Pt for OR tomorrow when BS stable

## 2019-05-30 NOTE — H&P ADULT - NSHPPHYSICALEXAM_GEN_ALL_CORE
NAD  A/O x 3, normal motor/sensory, no deficits  normal heart rate  normal resp effort  no thrill in LUE AVF

## 2019-05-30 NOTE — H&P ADULT - NSHPLABSRESULTS_GEN_ALL_CORE
8.9    7.12  )-----------( 83       ( 30 May 2019 18:02 )             29.2     05-30    131<L>  |  91<L>  |  57<H>  ----------------------------<  572<HH>  4.6   |  23  |  5.59<H>    Ca    8.2<L>      30 May 2019 18:02
SHERICE Quiroz

## 2019-05-30 NOTE — ED PROVIDER NOTE - CARE PLAN
Principal Discharge DX:	Hyperglycemia  Secondary Diagnosis:	Thrombosis of arteriovenous fistula, subsequent encounter

## 2019-05-30 NOTE — H&P ADULT - NSICDXPASTSURGICALHX_GEN_ALL_CORE_FT
PAST SURGICAL HISTORY:  History of Cholecystectomy in 1990    left AV graft revision  x 3 times , last one 2010     Surgery, elective mastectomy Left breast with lymph node removal

## 2019-05-30 NOTE — ED PROVIDER NOTE - OBJECTIVE STATEMENT
Pt w/ PMHx HTN, IDDM (reports Levemir 10 units QHS, Humalog before meals based on SS, no orals), CAD s/p PCI, breast ca s/p mastectomy, and ESRD on HD (MWF, last HD yesterday, does not make urine) sent to the ED from same day surgery s/p surgery canceled 2/2 hyperglycemia. Pt was scheduled for elective left arm AV fistula revision (open thrombectomy with possible angioplasty/stent) w/ vascular Dr Triplett. Pre-op, pt had received 24 hr protocol for reported contrast allergy (allergic reaction is renal failure), which was prednisone 50 mg at 6 am yesterday, 6 pm yesterday, and 6 am today. On arrival to same day surgery, finger stick was 579. She received 12 U of lispro. Recheck was 571, for which she received another 10 U of lispro. Repeat was 524, so pt was transferred to ER. Pt was started on an insulin drip by vascular team at 4 units per hour, which pt received for approx 30 min prior to arrival to the ED. Drip was discontinued on arrival to the ED. Denies polyuria, polydipsia. No confusion. No f/c, or infectious sx. No complaints.

## 2019-05-30 NOTE — ED PROVIDER NOTE - PHYSICAL EXAMINATION
Constitutional: Well appearing, well nourished, awake, alert, oriented to person, place, time/situation and in no apparent distress.  ENMT: Airway patent. Normal MM  Eyes: Clear bilaterally  Cardiac: Normal rate, regular rhythm.  Heart sounds S1, S2.  No murmurs, rubs or gallops. R chest HD catheter  Respiratory: Breaths sounds equal and clear b/l. No increased WOB, tachypnea, hypoxia, or accessory mm use. Pt speaks in full sentences.   Gastrointestinal: Abd soft, NT, ND, NABS. No guarding, rebound, or rigidity. No pulsatile abdominal masses. No organomegaly appreciated. No CVAT   Musculoskeletal: Range of motion is not limited  Neuro: Alert and oriented x 3, face symmetric and speech fluent. Strength 5/5 x 4 ext and symmetric, nml gross motor movement, nml gait. No focal deficits noted.  Skin: Skin normal color for race, warm, dry and intact. No evidence of rash.  Psych: Alert and oriented to person, place, time/situation. normal mood and affect. no apparent risk to self or others.

## 2019-05-30 NOTE — ED ADULT NURSE REASSESSMENT NOTE - NS ED NURSE REASSESS COMMENT FT1
awaiting for medication from pharmacy. code on 5 uris, unable to get in contact with receiving nurse. will follow up.

## 2019-05-31 ENCOUNTER — RESULT REVIEW (OUTPATIENT)
Age: 79
End: 2019-05-31

## 2019-05-31 LAB
ANION GAP SERPL CALC-SCNC: 16 MMOL/L — SIGNIFICANT CHANGE UP (ref 5–17)
ANION GAP SERPL CALC-SCNC: 18 MMOL/L — HIGH (ref 5–17)
BASE EXCESS BLDA CALC-SCNC: -1.6 MMOL/L — SIGNIFICANT CHANGE UP (ref -2–3)
BASE EXCESS BLDA CALC-SCNC: -2 MMOL/L — SIGNIFICANT CHANGE UP (ref -2–3)
BASE EXCESS BLDA CALC-SCNC: -2.1 MMOL/L — LOW (ref -2–3)
BASE EXCESS BLDA CALC-SCNC: -2.6 MMOL/L — LOW (ref -2–3)
BASE EXCESS BLDA CALC-SCNC: -5.2 MMOL/L — LOW (ref -2–3)
BASE EXCESS BLDA CALC-SCNC: -6 MMOL/L — LOW (ref -2–3)
BUN SERPL-MCNC: 63 MG/DL — HIGH (ref 7–23)
BUN SERPL-MCNC: 64 MG/DL — HIGH (ref 7–23)
CA-I BLDA-SCNC: 0.89 MMOL/L — LOW (ref 1.12–1.3)
CA-I BLDA-SCNC: 0.94 MMOL/L — LOW (ref 1.12–1.3)
CA-I BLDA-SCNC: 0.96 MMOL/L — LOW (ref 1.12–1.3)
CA-I BLDA-SCNC: 1.03 MMOL/L — LOW (ref 1.12–1.3)
CA-I BLDA-SCNC: 1.03 MMOL/L — LOW (ref 1.12–1.3)
CALCIUM SERPL-MCNC: 7.2 MG/DL — LOW (ref 8.4–10.5)
CALCIUM SERPL-MCNC: 8.2 MG/DL — LOW (ref 8.4–10.5)
CHLORIDE SERPL-SCNC: 100 MMOL/L — SIGNIFICANT CHANGE UP (ref 96–108)
CHLORIDE SERPL-SCNC: 94 MMOL/L — LOW (ref 96–108)
CO2 SERPL-SCNC: 20 MMOL/L — LOW (ref 22–31)
CO2 SERPL-SCNC: 20 MMOL/L — LOW (ref 22–31)
COHGB MFR BLDA: 0.4 % — SIGNIFICANT CHANGE UP
COHGB MFR BLDA: 0.6 % — SIGNIFICANT CHANGE UP
COHGB MFR BLDA: 1.2 % — SIGNIFICANT CHANGE UP
CREAT SERPL-MCNC: 6.02 MG/DL — HIGH (ref 0.5–1.3)
CREAT SERPL-MCNC: 6.03 MG/DL — HIGH (ref 0.5–1.3)
GAS PNL BLDA: SIGNIFICANT CHANGE UP
GLUCOSE BLDC GLUCOMTR-MCNC: 100 MG/DL — HIGH (ref 70–99)
GLUCOSE BLDC GLUCOMTR-MCNC: 115 MG/DL — HIGH (ref 70–99)
GLUCOSE BLDC GLUCOMTR-MCNC: 120 MG/DL — HIGH (ref 70–99)
GLUCOSE BLDC GLUCOMTR-MCNC: 133 MG/DL — HIGH (ref 70–99)
GLUCOSE BLDC GLUCOMTR-MCNC: 165 MG/DL — HIGH (ref 70–99)
GLUCOSE BLDC GLUCOMTR-MCNC: 193 MG/DL — HIGH (ref 70–99)
GLUCOSE BLDC GLUCOMTR-MCNC: 205 MG/DL — HIGH (ref 70–99)
GLUCOSE BLDC GLUCOMTR-MCNC: 216 MG/DL — HIGH (ref 70–99)
GLUCOSE BLDC GLUCOMTR-MCNC: 231 MG/DL — HIGH (ref 70–99)
GLUCOSE BLDC GLUCOMTR-MCNC: 247 MG/DL — HIGH (ref 70–99)
GLUCOSE BLDC GLUCOMTR-MCNC: 247 MG/DL — HIGH (ref 70–99)
GLUCOSE BLDC GLUCOMTR-MCNC: 248 MG/DL — HIGH (ref 70–99)
GLUCOSE BLDC GLUCOMTR-MCNC: 358 MG/DL — HIGH (ref 70–99)
GLUCOSE SERPL-MCNC: 177 MG/DL — HIGH (ref 70–99)
GLUCOSE SERPL-MCNC: 390 MG/DL — HIGH (ref 70–99)
HCO3 BLDA-SCNC: 21 MMOL/L — SIGNIFICANT CHANGE UP (ref 21–28)
HCO3 BLDA-SCNC: 22 MMOL/L — SIGNIFICANT CHANGE UP (ref 21–28)
HCT VFR BLD CALC: 25.5 % — LOW (ref 34.5–45)
HCT VFR BLD CALC: 30.4 % — LOW (ref 34.5–45)
HGB BLD-MCNC: 7.9 G/DL — LOW (ref 11.5–15.5)
HGB BLD-MCNC: 9.3 G/DL — LOW (ref 11.5–15.5)
HGB BLDA-MCNC: 10.5 G/DL — LOW (ref 11.5–15.5)
HGB BLDA-MCNC: 10.8 G/DL — LOW (ref 11.5–15.5)
HGB BLDA-MCNC: 11 G/DL — LOW (ref 11.5–15.5)
HGB BLDA-MCNC: 9.8 G/DL — LOW (ref 11.5–15.5)
HGB BLDA-MCNC: 9.8 G/DL — LOW (ref 11.5–15.5)
MAGNESIUM SERPL-MCNC: 1.8 MG/DL — SIGNIFICANT CHANGE UP (ref 1.6–2.6)
MAGNESIUM SERPL-MCNC: 2.1 MG/DL — SIGNIFICANT CHANGE UP (ref 1.6–2.6)
MCHC RBC-ENTMCNC: 30.6 GM/DL — LOW (ref 32–36)
MCHC RBC-ENTMCNC: 31 GM/DL — LOW (ref 32–36)
MCHC RBC-ENTMCNC: 31.1 PG — SIGNIFICANT CHANGE UP (ref 27–34)
MCHC RBC-ENTMCNC: 31.3 PG — SIGNIFICANT CHANGE UP (ref 27–34)
MCV RBC AUTO: 100.4 FL — HIGH (ref 80–100)
MCV RBC AUTO: 102.4 FL — HIGH (ref 80–100)
METHGB MFR BLDA: 0.2 % — SIGNIFICANT CHANGE UP
METHGB MFR BLDA: 0.2 % — SIGNIFICANT CHANGE UP
METHGB MFR BLDA: 0.3 % — SIGNIFICANT CHANGE UP
NRBC # BLD: 0 /100 WBCS — SIGNIFICANT CHANGE UP (ref 0–0)
NRBC # BLD: 0 /100 WBCS — SIGNIFICANT CHANGE UP (ref 0–0)
O2 CT VFR BLDA CALC: 14.2 ML/DL — SIGNIFICANT CHANGE UP (ref 15–23)
O2 CT VFR BLDA CALC: 14.6 ML/DL — SIGNIFICANT CHANGE UP (ref 15–23)
O2 CT VFR BLDA CALC: 14.9 ML/DL — SIGNIFICANT CHANGE UP (ref 15–23)
O2 CT VFR BLDA CALC: SIGNIFICANT CHANGE UP (ref 15–23)
O2 CT VFR BLDA CALC: SIGNIFICANT CHANGE UP (ref 15–23)
OXYHGB MFR BLDA: 93 % — LOW (ref 94–100)
OXYHGB MFR BLDA: 99 % — SIGNIFICANT CHANGE UP (ref 94–100)
PCO2 BLDA: 32 MMHG — SIGNIFICANT CHANGE UP (ref 32–45)
PCO2 BLDA: 32 MMHG — SIGNIFICANT CHANGE UP (ref 32–45)
PCO2 BLDA: 33 MMHG — SIGNIFICANT CHANGE UP (ref 32–45)
PCO2 BLDA: 35 MMHG — SIGNIFICANT CHANGE UP (ref 32–45)
PCO2 BLDA: 44 MMHG — SIGNIFICANT CHANGE UP (ref 32–45)
PCO2 BLDA: 47 MMHG — HIGH (ref 32–45)
PH BLDA: 7.26 — LOW (ref 7.35–7.45)
PH BLDA: 7.3 — LOW (ref 7.35–7.45)
PH BLDA: 7.42 — SIGNIFICANT CHANGE UP (ref 7.35–7.45)
PH BLDA: 7.43 — SIGNIFICANT CHANGE UP (ref 7.35–7.45)
PH BLDA: 7.44 — SIGNIFICANT CHANGE UP (ref 7.35–7.45)
PH BLDA: 7.45 — SIGNIFICANT CHANGE UP (ref 7.35–7.45)
PHOSPHATE SERPL-MCNC: 4 MG/DL — SIGNIFICANT CHANGE UP (ref 2.5–4.5)
PHOSPHATE SERPL-MCNC: 4.4 MG/DL — SIGNIFICANT CHANGE UP (ref 2.5–4.5)
PLATELET # BLD AUTO: 78 K/UL — LOW (ref 150–400)
PLATELET # BLD AUTO: 79 K/UL — LOW (ref 150–400)
PO2 BLDA: 281 MMHG — HIGH (ref 83–108)
PO2 BLDA: 320 MMHG — HIGH (ref 83–108)
PO2 BLDA: 359 MMHG — HIGH (ref 83–108)
PO2 BLDA: 478 MMHG — HIGH (ref 83–108)
PO2 BLDA: 80 MMHG — LOW (ref 83–108)
PO2 BLDA: 98 MMHG — SIGNIFICANT CHANGE UP (ref 83–108)
POTASSIUM BLDA-SCNC: 4 MMOL/L — SIGNIFICANT CHANGE UP (ref 3.5–4.9)
POTASSIUM BLDA-SCNC: 4 MMOL/L — SIGNIFICANT CHANGE UP (ref 3.5–4.9)
POTASSIUM BLDA-SCNC: 4.2 MMOL/L — SIGNIFICANT CHANGE UP (ref 3.5–4.9)
POTASSIUM BLDA-SCNC: 4.2 MMOL/L — SIGNIFICANT CHANGE UP (ref 3.5–4.9)
POTASSIUM BLDA-SCNC: 4.5 MMOL/L — SIGNIFICANT CHANGE UP (ref 3.5–4.9)
POTASSIUM SERPL-MCNC: 4.4 MMOL/L — SIGNIFICANT CHANGE UP (ref 3.5–5.3)
POTASSIUM SERPL-MCNC: 4.7 MMOL/L — SIGNIFICANT CHANGE UP (ref 3.5–5.3)
POTASSIUM SERPL-SCNC: 4.4 MMOL/L — SIGNIFICANT CHANGE UP (ref 3.5–5.3)
POTASSIUM SERPL-SCNC: 4.7 MMOL/L — SIGNIFICANT CHANGE UP (ref 3.5–5.3)
RBC # BLD: 2.54 M/UL — LOW (ref 3.8–5.2)
RBC # BLD: 2.97 M/UL — LOW (ref 3.8–5.2)
RBC # FLD: 14.5 % — SIGNIFICANT CHANGE UP (ref 10.3–14.5)
RBC # FLD: 14.6 % — HIGH (ref 10.3–14.5)
SAO2 % BLDA: 100 % — SIGNIFICANT CHANGE UP (ref 95–100)
SAO2 % BLDA: 94 % — LOW (ref 95–100)
SAO2 % BLDA: 97 % — SIGNIFICANT CHANGE UP (ref 95–100)
SODIUM BLDA-SCNC: 128 MMOL/L — LOW (ref 138–146)
SODIUM BLDA-SCNC: 129 MMOL/L — LOW (ref 138–146)
SODIUM BLDA-SCNC: 130 MMOL/L — LOW (ref 138–146)
SODIUM BLDA-SCNC: 130 MMOL/L — LOW (ref 138–146)
SODIUM BLDA-SCNC: 133 MMOL/L — LOW (ref 138–146)
SODIUM SERPL-SCNC: 132 MMOL/L — LOW (ref 135–145)
SODIUM SERPL-SCNC: 136 MMOL/L — SIGNIFICANT CHANGE UP (ref 135–145)
WBC # BLD: 7.83 K/UL — SIGNIFICANT CHANGE UP (ref 3.8–10.5)
WBC # BLD: 8.63 K/UL — SIGNIFICANT CHANGE UP (ref 3.8–10.5)
WBC # FLD AUTO: 7.83 K/UL — SIGNIFICANT CHANGE UP (ref 3.8–10.5)
WBC # FLD AUTO: 8.63 K/UL — SIGNIFICANT CHANGE UP (ref 3.8–10.5)

## 2019-05-31 PROCEDURE — 99222 1ST HOSP IP/OBS MODERATE 55: CPT | Mod: GC

## 2019-05-31 PROCEDURE — 99223 1ST HOSP IP/OBS HIGH 75: CPT | Mod: GC

## 2019-05-31 RX ORDER — HEPARIN SODIUM 5000 [USP'U]/ML
5000 INJECTION INTRAVENOUS; SUBCUTANEOUS EVERY 8 HOURS
Refills: 0 | Status: DISCONTINUED | OUTPATIENT
Start: 2019-05-31 | End: 2019-06-05

## 2019-05-31 RX ORDER — PANTOPRAZOLE SODIUM 20 MG/1
40 TABLET, DELAYED RELEASE ORAL
Refills: 0 | Status: DISCONTINUED | OUTPATIENT
Start: 2019-05-31 | End: 2019-06-05

## 2019-05-31 RX ORDER — PHENYLEPHRINE HYDROCHLORIDE 10 MG/ML
0.11 INJECTION INTRAVENOUS
Qty: 40 | Refills: 0 | Status: DISCONTINUED | OUTPATIENT
Start: 2019-05-31 | End: 2019-06-01

## 2019-05-31 RX ORDER — INSULIN DETEMIR 100/ML (3)
12 INSULIN PEN (ML) SUBCUTANEOUS AT BEDTIME
Refills: 0 | Status: DISCONTINUED | OUTPATIENT
Start: 2019-05-31 | End: 2019-06-02

## 2019-05-31 RX ORDER — CALCIUM CHLORIDE
250 POWDER (GRAM) MISCELLANEOUS ONCE
Refills: 0 | Status: COMPLETED | OUTPATIENT
Start: 2019-05-31 | End: 2019-05-31

## 2019-05-31 RX ORDER — HYDROMORPHONE HYDROCHLORIDE 2 MG/ML
0.5 INJECTION INTRAMUSCULAR; INTRAVENOUS; SUBCUTANEOUS EVERY 4 HOURS
Refills: 0 | Status: DISCONTINUED | OUTPATIENT
Start: 2019-05-31 | End: 2019-06-05

## 2019-05-31 RX ORDER — INSULIN LISPRO 100/ML
VIAL (ML) SUBCUTANEOUS
Refills: 0 | Status: DISCONTINUED | OUTPATIENT
Start: 2019-05-31 | End: 2019-06-04

## 2019-05-31 RX ORDER — CINACALCET 30 MG/1
30 TABLET, FILM COATED ORAL DAILY
Refills: 0 | Status: DISCONTINUED | OUTPATIENT
Start: 2019-05-31 | End: 2019-06-05

## 2019-05-31 RX ORDER — CARVEDILOL PHOSPHATE 80 MG/1
1 CAPSULE, EXTENDED RELEASE ORAL
Qty: 0 | Refills: 0 | DISCHARGE

## 2019-05-31 RX ORDER — ACETAMINOPHEN 500 MG
650 TABLET ORAL EVERY 6 HOURS
Refills: 0 | Status: DISCONTINUED | OUTPATIENT
Start: 2019-05-31 | End: 2019-06-05

## 2019-05-31 RX ORDER — SODIUM CHLORIDE 9 MG/ML
1000 INJECTION INTRAMUSCULAR; INTRAVENOUS; SUBCUTANEOUS
Refills: 0 | Status: DISCONTINUED | OUTPATIENT
Start: 2019-05-31 | End: 2019-06-02

## 2019-05-31 RX ORDER — CLOPIDOGREL BISULFATE 75 MG/1
75 TABLET, FILM COATED ORAL DAILY
Refills: 0 | Status: DISCONTINUED | OUTPATIENT
Start: 2019-05-31 | End: 2019-06-05

## 2019-05-31 RX ORDER — SEVELAMER CARBONATE 2400 MG/1
1600 POWDER, FOR SUSPENSION ORAL
Refills: 0 | Status: DISCONTINUED | OUTPATIENT
Start: 2019-05-31 | End: 2019-06-03

## 2019-05-31 RX ORDER — ASPIRIN/CALCIUM CARB/MAGNESIUM 324 MG
81 TABLET ORAL DAILY
Refills: 0 | Status: DISCONTINUED | OUTPATIENT
Start: 2019-05-31 | End: 2019-06-05

## 2019-05-31 RX ORDER — INSULIN LISPRO 100/ML
VIAL (ML) SUBCUTANEOUS
Refills: 0 | Status: DISCONTINUED | OUTPATIENT
Start: 2019-06-01 | End: 2019-06-04

## 2019-05-31 RX ORDER — INSULIN LISPRO 100/ML
3 VIAL (ML) SUBCUTANEOUS ONCE
Refills: 0 | Status: COMPLETED | OUTPATIENT
Start: 2019-05-31 | End: 2019-05-31

## 2019-05-31 RX ADMIN — HEPARIN SODIUM 5000 UNIT(S): 5000 INJECTION INTRAVENOUS; SUBCUTANEOUS at 05:39

## 2019-05-31 RX ADMIN — Medication 10: at 02:34

## 2019-05-31 RX ADMIN — Medication 60 MILLIGRAM(S): at 05:39

## 2019-05-31 RX ADMIN — Medication 250 MILLIGRAM(S): at 15:45

## 2019-05-31 RX ADMIN — CLOPIDOGREL BISULFATE 75 MILLIGRAM(S): 75 TABLET, FILM COATED ORAL at 19:16

## 2019-05-31 RX ADMIN — Medication 4: at 05:38

## 2019-05-31 RX ADMIN — Medication 2: at 22:59

## 2019-05-31 RX ADMIN — PANTOPRAZOLE SODIUM 40 MILLIGRAM(S): 20 TABLET, DELAYED RELEASE ORAL at 05:42

## 2019-05-31 RX ADMIN — Medication 3 UNIT(S): at 17:42

## 2019-05-31 RX ADMIN — Medication 650 MILLIGRAM(S): at 06:30

## 2019-05-31 RX ADMIN — Medication 75 MILLIGRAM(S): at 05:40

## 2019-05-31 RX ADMIN — Medication 81 MILLIGRAM(S): at 19:18

## 2019-05-31 RX ADMIN — Medication 50 MILLIGRAM(S): at 05:39

## 2019-05-31 RX ADMIN — Medication 2: at 16:56

## 2019-05-31 RX ADMIN — PHENYLEPHRINE HYDROCHLORIDE 3 MICROGRAM(S)/KG/MIN: 10 INJECTION INTRAVENOUS at 15:45

## 2019-05-31 RX ADMIN — HEPARIN SODIUM 5000 UNIT(S): 5000 INJECTION INTRAVENOUS; SUBCUTANEOUS at 22:58

## 2019-05-31 NOTE — CONSULT NOTE ADULT - ASSESSMENT
Patient is a 79 yo AAF with PMH of ESRD on HD currently via L IJ HD catheter, breast cancer s/p L mastectomy CAD s/p stent who was admitted for AVG revision which was done today.   She underwent open thrombectomy.   NEphrology consult is placed in regards to ESRD on HD      ESRD on HD via L IJ HD cath  s/p LUE AVG revision  electrolytes noted- WNL  volume status acceptable  due to ongoing hypotension, will defer HD today  NExt HD anticipated on 6/1  meanwhile, recommend obtaining CXR       hypotension  on phenylephrine drip
Ms. Del Angel is a 77 yo F with thrombosed LUE AVF s/p open thrombectomy and revision admitted to SICU for hypotension (on pressor support).     Neuro: tylenol PRN, zofran PRN  CV: ASA, Plavix. MAP>65 on phenylephrine. (BP meds being held: metoprolol, nifedipine)  Pulm: Satting well on NC   GI: renal regular diet  : HD MWF (next on Sat, 6/1)  ID:  Vanc (5/31 - 9 AM)  Endo: ISS, detemir  PPx: SQH.   Lines: PIV, LIJ permacath, A-line  Wounds: LUE brachiocephalic AVG

## 2019-05-31 NOTE — PROGRESS NOTE ADULT - SUBJECTIVE AND OBJECTIVE BOX
Surgery Post op Note    Procedure Thrombectomy, AV graft, left upper extremity     SUBJECTIVE: Pt seen  Denies CP/SOB. Pain well controlled.  Phenylephrine drip @0.5      Vital Signs Last 24 Hrs  T(C): 35.7 (31 May 2019 14:46), Max: 36.8 (31 May 2019 06:32)  T(F): 96.2 (31 May 2019 14:46), Max: 98.3 (31 May 2019 06:32)  HR: 64 (31 May 2019 15:46) (62 - 73)  BP: 98/47 (31 May 2019 15:46) (90/43 - 138/79)  BP(mean): 66 (31 May 2019 15:46) (54 - 66)  RR: 15 (31 May 2019 15:46) (14 - 18)  SpO2: 100% (31 May 2019 15:46) (97% - 100%)  I&O's Summary    31 May 2019 07:01  -  31 May 2019 16:42  --------------------------------------------------------  IN: 68 mL / OUT: 0 mL / NET: 68 mL      I&O's Detail    31 May 2019 07:01  -  31 May 2019 16:42  --------------------------------------------------------  IN:    phenylephrine   Infusion: 28 mL    sodium chloride 0.9%.: 40 mL  Total IN: 68 mL    OUT:  Total OUT: 0 mL    Total NET: 68 mL          MEDICATIONS  (STANDING):  aspirin  chewable 81 milliGRAM(s) Oral daily  cinacalcet 30 milliGRAM(s) Oral daily  clopidogrel Tablet 75 milliGRAM(s) Oral daily  heparin  Injectable 5000 Unit(s) SubCutaneous every 8 hours  insulin lispro (HumaLOG) corrective regimen sliding scale   SubCutaneous Before meals and at bedtime  multivitamin 1 Tablet(s) Oral daily  pantoprazole    Tablet 40 milliGRAM(s) Oral before breakfast  phenylephrine    Infusion 0.106 MICROgram(s)/kG/Min (3 mL/Hr) IV Continuous <Continuous>  pregabalin 75 milliGRAM(s) Oral daily  sevelamer carbonate 1600 milliGRAM(s) Oral three times a day with meals  sodium chloride 0.9%. 1000 milliLiter(s) (20 mL/Hr) IV Continuous <Continuous>    MEDICATIONS  (PRN):  acetaminophen   Tablet .. 650 milliGRAM(s) Oral every 6 hours PRN Moderate Pain (4 - 6)      LABS:                        7.9    8.63  )-----------( 79       ( 31 May 2019 14:59 )             25.5     05-31    136  |  100  |  63<H>  ----------------------------<  177<H>  4.4   |  20<L>  |  6.03<H>    Ca    7.2<L>      31 May 2019 14:59  Phos  4.0     05-31  Mg     1.8     05-31                Physical exam  General NAD  RUE dressings CDI    A/P: 78y Female  s/p above procedure  - Diet: renal, diabetic  - Activity- OOB ambulate   - Labs: f/u in AM  - Pain medication  - DVT ppx Surgery Post op Note    Procedure Thrombectomy, AV graft, left upper extremity     SUBJECTIVE: Pt seen  Denies CP/SOB. Pain well controlled.  Phenylephrine drip @0.5      Vital Signs Last 24 Hrs  T(C): 35.7 (31 May 2019 14:46), Max: 36.8 (31 May 2019 06:32)  T(F): 96.2 (31 May 2019 14:46), Max: 98.3 (31 May 2019 06:32)  HR: 64 (31 May 2019 15:46) (62 - 73)  BP: 98/47 (31 May 2019 15:46) (90/43 - 138/79)  BP(mean): 66 (31 May 2019 15:46) (54 - 66)  RR: 15 (31 May 2019 15:46) (14 - 18)  SpO2: 100% (31 May 2019 15:46) (97% - 100%)  I&O's Summary    31 May 2019 07:01  -  31 May 2019 16:42  --------------------------------------------------------  IN: 68 mL / OUT: 0 mL / NET: 68 mL      I&O's Detail    31 May 2019 07:01  -  31 May 2019 16:42  --------------------------------------------------------  IN:    phenylephrine   Infusion: 28 mL    sodium chloride 0.9%.: 40 mL  Total IN: 68 mL    OUT:  Total OUT: 0 mL    Total NET: 68 mL          MEDICATIONS  (STANDING):  aspirin  chewable 81 milliGRAM(s) Oral daily  cinacalcet 30 milliGRAM(s) Oral daily  clopidogrel Tablet 75 milliGRAM(s) Oral daily  heparin  Injectable 5000 Unit(s) SubCutaneous every 8 hours  insulin lispro (HumaLOG) corrective regimen sliding scale   SubCutaneous Before meals and at bedtime  multivitamin 1 Tablet(s) Oral daily  pantoprazole    Tablet 40 milliGRAM(s) Oral before breakfast  phenylephrine    Infusion 0.106 MICROgram(s)/kG/Min (3 mL/Hr) IV Continuous <Continuous>  pregabalin 75 milliGRAM(s) Oral daily  sevelamer carbonate 1600 milliGRAM(s) Oral three times a day with meals  sodium chloride 0.9%. 1000 milliLiter(s) (20 mL/Hr) IV Continuous <Continuous>    MEDICATIONS  (PRN):  acetaminophen   Tablet .. 650 milliGRAM(s) Oral every 6 hours PRN Moderate Pain (4 - 6)      LABS:                        7.9    8.63  )-----------( 79       ( 31 May 2019 14:59 )             25.5     05-31    136  |  100  |  63<H>  ----------------------------<  177<H>  4.4   |  20<L>  |  6.03<H>    Ca    7.2<L>      31 May 2019 14:59  Phos  4.0     05-31  Mg     1.8     05-31                Physical exam  General NAD  LUE dressings CDI, palpable thrill,     A/P: 78y Female  s/p above procedure  - Diet: renal, diabetic  - Activity- OOB ambulate   - Labs: f/u in AM  - Pain medication  - DVT ppx

## 2019-05-31 NOTE — CONSULT NOTE ADULT - ATTENDING COMMENTS
Pt seen on rounds this afternoon while she was still in PACU.  Was drowsy but able to give a history.  Insulin regimen as outpatient was sub-optimal due to lack of standing pre-meal insulin.  The marked hyperglycemia pre-op was mostly due to the large dose of Prednisone, but exacerbated by her ESRD, since her anuria eliminated glycosuria as a means of limiting the severity of her hyperglycemia.  (Her lack of glycosuria also accounts for the large amount of insulin which was necessary to correct the hyperglycemia)  At this point it is difficult to tell how much of the steroid effect is still present, though the rise in her glucoses in the past few hours suggest that it is still present.  Will give extra Humalog for the 5 PM correction dose but then return to a light scale given her ESRD.  Will increase Levemir for tonight to 12 units.  Should also be checked at 3 AM and given insulin coverage

## 2019-05-31 NOTE — CONSULT NOTE ADULT - SUBJECTIVE AND OBJECTIVE BOX
HPI:  77 yo F with HTN, IDDM, CAD s/p stent, breast ca s/p mastectomy, and ESRD on HD (MWF) presented this morning to same day surgery for elective left arm AV fistula revision (open thrombectomy with possible angioplasty/stent). Pre-op, pt had received 24 hr protocol for reported contrast allergy, which was prednisone 50 mg at 6 am yesterday, 6 pm yesterday, and 6 am today. On arrival to same day surgery, finger stick was 579. She received 12 U of lispro. Recheck was 571, for which she received another 10 U of lispro. Repeat was 524, so pt was transferred to ER. There, endocrine recommended levemir 12 U tonight and lispro 4 U with dinner, as well as Q4H FS with Q4H sliding scale.     The whole time, pt had no mental status changes, no blurry vision, no thirstiness, no abd pain, no syncope. Upon clarification, pt has never had anaphylaxis/rash/hives/laryngospasm to contrast. The issue it caused her was renal failure which eventually put her on dialysis.     SICU Addendum:    Patient on small amount of phenylephrine. Mentating well. Admitted to SICU for hemodynamic monitoring.    LAST HD WED, 5/29/19. (30 May 2019 21:06)      PAST MEDICAL & SURGICAL HISTORY:  HTN (hypertension)  Pagets Disease, Breast: left breast  CHF (Congestive Heart Failure): in the past  Breast Cancer: left breast  DM (Diabetes Mellitus): pt on insulin  ESRD on Dialysis: monday / wedensday/friday  CAD (Coronary Artery Disease): s/p drug eluting stent insertion in may 2010  Surgery, elective: mastectomy Left breast with lymph node removal  left AV graft revision  x 3 times , last one 2010  History of Cholecystectomy: in 1990      ROS: See HPI    MEDICATIONS  (STANDING):  aspirin  chewable 81 milliGRAM(s) Oral daily  cinacalcet 30 milliGRAM(s) Oral daily  clopidogrel Tablet 75 milliGRAM(s) Oral daily  heparin  Injectable 5000 Unit(s) SubCutaneous every 8 hours  insulin detemir injectable (LEVEMIR) 12 Unit(s) SubCutaneous at bedtime  insulin lispro (HumaLOG) corrective regimen sliding scale   SubCutaneous Before meals and at bedtime  multivitamin 1 Tablet(s) Oral daily  pantoprazole    Tablet 40 milliGRAM(s) Oral before breakfast  phenylephrine    Infusion 0.106 MICROgram(s)/kG/Min (3 mL/Hr) IV Continuous <Continuous>  pregabalin 75 milliGRAM(s) Oral daily  sevelamer carbonate 1600 milliGRAM(s) Oral three times a day with meals  sodium chloride 0.9%. 1000 milliLiter(s) (20 mL/Hr) IV Continuous <Continuous>    MEDICATIONS  (PRN):  acetaminophen   Tablet .. 650 milliGRAM(s) Oral every 6 hours PRN Moderate Pain (4 - 6)  HYDROmorphone  Injectable 0.5 milliGRAM(s) IV Push every 4 hours PRN Severe Pain (7 - 10)      Allergies    penicillins (Swelling)    Intolerances        SOCIAL HISTORY:  Smoke: Never Smoker  EtOH: occasional    FAMILY HISTORY:  No pertinent family history in first degree relatives      Vital Signs Last 24 Hrs  T(C): 35.7 (31 May 2019 14:46), Max: 36.8 (31 May 2019 06:32)  T(F): 96.2 (31 May 2019 14:46), Max: 98.3 (31 May 2019 06:32)  HR: 64 (31 May 2019 18:36) (62 - 78)  BP: 96/45 (31 May 2019 18:36) (90/43 - 138/79)  BP(mean): 67 (31 May 2019 18:36) (54 - 85)  RR: 13 (31 May 2019 18:36) (12 - 34)  SpO2: 100% (31 May 2019 18:36) (97% - 100%)    PHYSICAL EXAM    Gen: NAD   CV: RRR  Pulm: CTAB  Abd: Soft, NT/ND  Ext:     LABS:                        7.9    8.63  )-----------( 79       ( 31 May 2019 14:59 )             25.5     05-31    136  |  100  |  63<H>  ----------------------------<  177<H>  4.4   |  20<L>  |  6.03<H>    Ca    7.2<L>      31 May 2019 14:59  Phos  4.0     05-31  Mg     1.8     05-31            RADIOLOGY & ADDITIONAL STUDIES:    Assessment and Plan:  78yFemale HPI:  79 yo F with HTN, IDDM, CAD s/p stent, breast ca s/p mastectomy, and ESRD on HD (MWF) presented this morning to same day surgery for elective left arm AV fistula revision (open thrombectomy with possible angioplasty/stent). Pre-op, pt had received 24 hr protocol for reported contrast allergy, which was prednisone 50 mg at 6 am yesterday, 6 pm yesterday, and 6 am today. On arrival to same day surgery, finger stick was 579. She received 12 U of lispro. Recheck was 571, for which she received another 10 U of lispro. Repeat was 524, so pt was transferred to ER. There, endocrine recommended levemir 12 U tonight and lispro 4 U with dinner, as well as Q4H FS with Q4H sliding scale.     The whole time, pt had no mental status changes, no blurry vision, no thirstiness, no abd pain, no syncope. Upon clarification, pt has never had anaphylaxis/rash/hives/laryngospasm to contrast. The issue it caused her was renal failure which eventually put her on dialysis.     SICU Addendum:    Patient on small amount of phenylephrine. Mentating well. Admitted to SICU for hemodynamic monitoring.    LAST HD WED, 5/29/19. (30 May 2019 21:06)      PAST MEDICAL & SURGICAL HISTORY:  HTN (hypertension)  Pagets Disease, Breast: left breast  CHF (Congestive Heart Failure): in the past  Breast Cancer: left breast  DM (Diabetes Mellitus): pt on insulin  ESRD on Dialysis: monday / wedensday/friday  CAD (Coronary Artery Disease): s/p drug eluting stent insertion in may 2010  Surgery, elective: mastectomy Left breast with lymph node removal  left AV graft revision  x 3 times , last one 2010  History of Cholecystectomy: in 1990      ROS: See HPI    MEDICATIONS  (STANDING):  aspirin  chewable 81 milliGRAM(s) Oral daily  cinacalcet 30 milliGRAM(s) Oral daily  clopidogrel Tablet 75 milliGRAM(s) Oral daily  heparin  Injectable 5000 Unit(s) SubCutaneous every 8 hours  insulin detemir injectable (LEVEMIR) 12 Unit(s) SubCutaneous at bedtime  insulin lispro (HumaLOG) corrective regimen sliding scale   SubCutaneous Before meals and at bedtime  multivitamin 1 Tablet(s) Oral daily  pantoprazole    Tablet 40 milliGRAM(s) Oral before breakfast  phenylephrine    Infusion 0.106 MICROgram(s)/kG/Min (3 mL/Hr) IV Continuous <Continuous>  pregabalin 75 milliGRAM(s) Oral daily  sevelamer carbonate 1600 milliGRAM(s) Oral three times a day with meals  sodium chloride 0.9%. 1000 milliLiter(s) (20 mL/Hr) IV Continuous <Continuous>    MEDICATIONS  (PRN):  acetaminophen   Tablet .. 650 milliGRAM(s) Oral every 6 hours PRN Moderate Pain (4 - 6)  HYDROmorphone  Injectable 0.5 milliGRAM(s) IV Push every 4 hours PRN Severe Pain (7 - 10)      Allergies    penicillins (Swelling)    Intolerances        SOCIAL HISTORY:  Smoke: Never Smoker  EtOH: occasional    FAMILY HISTORY:  No pertinent family history in first degree relatives      Vital Signs Last 24 Hrs  T(C): 35.7 (31 May 2019 14:46), Max: 36.8 (31 May 2019 06:32)  T(F): 96.2 (31 May 2019 14:46), Max: 98.3 (31 May 2019 06:32)  HR: 64 (31 May 2019 18:36) (62 - 78)  BP: 96/45 (31 May 2019 18:36) (90/43 - 138/79)  BP(mean): 67 (31 May 2019 18:36) (54 - 85)  RR: 13 (31 May 2019 18:36) (12 - 34)  SpO2: 100% (31 May 2019 18:36) (97% - 100%)    PHYSICAL EXAM    Gen: NAD   CV: RRR  Pulm: CTAB  Abd: Soft, NT/ND  Ext: LUE AVF wrapped with Kerlix, dressing is clean, dry, and intact. Good thrill audible over dressing via stethoscope.     LABS:                        7.9    8.63  )-----------( 79       ( 31 May 2019 14:59 )             25.5     05-31    136  |  100  |  63<H>  ----------------------------<  177<H>  4.4   |  20<L>  |  6.03<H>    Ca    7.2<L>      31 May 2019 14:59  Phos  4.0     05-31  Mg     1.8     05-31            RADIOLOGY & ADDITIONAL STUDIES:

## 2019-05-31 NOTE — BRIEF OPERATIVE NOTE - OPERATION/FINDINGS
Open thrombectomy of LUE brachiocephalic AVG. Fistulogram then performed with thrombectomy of axillary vein using Python balloon catheter, and Open thrombectomy of LUE brachiocephalic AVG. Fistulogram then performed with thrombectomy of axillary vein using Python balloon catheter, and angioplasty of subclavian vein, and stent with angioplasty of axillary vein stenosis from intimal hyperplasia/scar. Portion of chronically thrombosed AVG resected, and new ringed PTFE graft placed as interposition graft. Hemostasis achieved, incision closed primarily.

## 2019-05-31 NOTE — CONSULT NOTE ADULT - SUBJECTIVE AND OBJECTIVE BOX
HPI: 79 yo F with HTN, IDDM, CAD s/p stent, breast ca s/p mastectomy, and ESRD on HD (MWF) presented this morning to same day surgery for elective left arm AV fistula revision (open thrombectomy with possible angioplasty/stent). Pre-op, pt had received 24 hr protocol for reported contrast allergy, which was prednisone 50 mg at 6 am yesterday, 6 pm yesterday, and 6 am today. On arrival to same day surgery, finger stick was 579. She received 12 U of lispro. Recheck was 571, for which she received another 10 U of lispro. Repeat was 524, so pt was transferred to ER. There, endocrine recommended levemir 12 U tonight and lispro 4 U with dinner, as well as Q4H FS with Q4H sliding scale.     The whole time, pt had no mental status changes, no blurry vision, no thirstiness, no abd pain, no syncope. Upon clarification, pt has never had anaphylaxis/rash/hives/laryngospasm to contrast. The issue it caused her was renal failure which eventually put her on dialysis.     LAST HD WED, 5/29/19.   maryjane: 78 year old female with Diabetes, ESRD presenting to ER with hyperglycemia in 500s. As per ER attending/note, patient was planned for a blocked AVF angio under vasuclar surgery but was perceived as having an allergy to contrast. She was therefore given 50mg of prednisone x 3 (last dose being taken by patient this morning). Procedure is now planned for tomorrow and she is being admitted under vascular surgery. She received as an outpatient lispro 12 units and then another 10 units lispro, unclear exactly what time each dose was given but 10 units lispro believed to have given around 4pm. She was also on an insulin drip for about 30 minutes. Patient not currently acidotic or ketotic. POCT 505 at 7pm. ER attending noted that patient may eat dinner in ER but will be NPO after midnight. At home, she states she takes Levemir 10 units and Novolog SS for meal coverage.     At this time, due to possibility and concern of insulin stacking, wound give her levemir dose of 12 units tonight, give a 1 time stat dose of 4 units by 8 if patient will be eating dinner, check her blood sugar Q4h and treat with moderate lispro SS Q4h. Once blood sugar is down <200, wound then switch to low dose lispro SS.     Age at Dx:  How dx:  Hx and duration of insulin:  Current Therapy:  Hx of hypoglycemia  Hx of DKA/HHS?    Home FSG:  Fasting  Lunch  Dinner  Bed    Hx of other regimens  Complications:  Outpatient Endo:    PMH & Surgical Hx:HYPERGLYCEMIA  H/o or current diagnosis of HF- ACEI/ARB contraindication unknown  No pertinent family history in first degree relatives  Handoff  MEWS Score  HTN (hypertension)  Pagets Disease, Breast  CHF (Congestive Heart Failure)  Breast Cancer  DM (Diabetes Mellitus)  ESRD on Dialysis  CAD (Coronary Artery Disease)  Hyperglycemia  Surgery, elective  left AV graft revision  x 3 times , last one 2010  History of Cholecystectomy  HYPERGLYCEMIA  90+  Thrombosis of arteriovenous fistula, subsequent encounter      FH:  DM:  Thyroid:  Autoimmune:  Other:    SH:  Smoking  Etoh:  Recreational Drugs:  Social Life:    Current Meds:      Allergies:  penicillins (Swelling)      ROS:  Denies the following except as indicated.    General: weight loss/weight gain, decreased appetite, fatigue  Eyes: Blurry vision, double vision, visual changes  ENT: Throat pain, changes in voice,   CV: palpitations, SOB, CP, cough  GI: NVD, difficulty swallowing, abdominal pain  : polyuria, dysuria  Endo: abnormal menses, temperature intolerance, decreased libido  MSK: weakness, joint pain  Skin: rash, dryness, diaphoresis  Heme: Easy bruising,bleeding  Neuro: HA, dizziness, lightheadedness, numbness tingling  Psych: Anxiety, Depression    Vital Signs Last 24 Hrs  T(C): 36.8 (31 May 2019 06:32), Max: 36.8 (31 May 2019 06:32)  T(F): 98.3 (31 May 2019 06:32), Max: 98.3 (31 May 2019 06:32)  HR: 69 (31 May 2019 05:07) (69 - 73)  BP: 123/64 (31 May 2019 05:07) (123/64 - 138/79)  BP(mean): --  RR: 18 (31 May 2019 05:07) (18 - 18)  SpO2: 99% (31 May 2019 05:07) (97% - 100%)  Height (cm): 168.91 (05-30 @ 15:24)  Weight (kg): 75.5 (05-30 @ 18:10)  BMI (kg/m2): 26.5 (05-30 @ 18:10)      Constitutional: wn/wd in NAD.   HEENT: NCAT, MMM, OP clear, EOMI, , no proptosis or lid retraction  Neck: no thyromegaly or palpable thyroid nodules   Respiratory: lungs CTAB.  Cardiovascular: regular rhythm, normal S1 and S2, no audible murmurs, no peripheral edema  GI: soft, NT/ND, no masses/HSM appreciated.  Neurology: no tremors, DTR 2+  Skin: no visible rashes/lesions  Psychiatric: AAO x 3, normal affect/mood.  Ext: radial pulses intact, DP pulses intact, extremities warm, no cyanosis, clubbing or edema.       LABS:                        9.3    7.83  )-----------( 78       ( 31 May 2019 02:50 )             30.4     05-31    132<L>  |  94<L>  |  64<H>  ----------------------------<  390<H>  4.7   |  20<L>  |  6.02<H>    Ca    8.2<L>      31 May 2019 02:50  Phos  4.4     05-31  Mg     2.1     05-31                RADIOLOGY & ADDITIONAL STUDIES:  CAPILLARY BLOOD GLUCOSE      POCT Blood Glucose.: 133 mg/dL (31 May 2019 09:21)  POCT Blood Glucose.: 205 mg/dL (31 May 2019 07:26)  POCT Blood Glucose.: 247 mg/dL (31 May 2019 05:02)  POCT Blood Glucose.: 358 mg/dL (31 May 2019 02:05)  POCT Blood Glucose.: 492 mg/dL (30 May 2019 22:22)  POCT Blood Glucose.: 469 mg/dL (30 May 2019 20:47)  POCT Blood Glucose.: 505 mg/dL (30 May 2019 19:11)  POCT Blood Glucose.: 524 mg/dL (30 May 2019 17:50)  POCT Blood Glucose.: 560 mg/dL (30 May 2019 15:47)  POCT Blood Glucose.: 571 mg/dL (30 May 2019 15:42)  POCT Blood Glucose.: 558 mg/dL (30 May 2019 14:45)  POCT Blood Glucose.: 579 mg/dL (30 May 2019 14:33)      WILL DISCUSS In ROUNDS  A/P: 79 yo F with thrombosed LUE AVF, admitted for pre-op steroid induced hyperglycemia management.    1.  DM  Please continue lantus       units at night / morning.  Please continue lispro      units before each meal.  Please continue lispro moderate / low dose sliding scale four times daily with meals and at bedtime    Pt's fingerstick glucose goal is     Will continue to monitor     For discharge, pt can continue    Pt can follow up at discharge with [facility] by calling  to make an appointment.   Will discuss case with     and update primary team HPI: 77 yo F with HTN, DM type 2, CAD s/p stent, breast ca s/p mastectomy, and ESRD on HD (MWF) presented yesterday to same day surgery for elective left arm AV fistula revision (open thrombectomy with possible angioplasty/stent). Pre-op, pt had received 24 hr protocol for reported contrast allergy, which was prednisone 50 mg at 6 am yesterday, 6 pm yesterday, and 6 am today. On arrival to same day surgery, finger stick was 579. She received 12 U of lispro. Recheck was 571, for which she received another 10 U of lispro. Patient not currently acidotic or ketotic. Repeat was 524, so pt was transferred to ER. She received total of lispro 59 units over 12 hours and levemir 12 units at bedtime. 7:30 AM  and remained 100-150 during day. She is now s/p LUE thrombectomy. Pt interviewed in PACU; answering questions appropriately, but drowsy.    At home, she takes levemir 10 units at night and novolog MISS starting at 150 before meals. She reports AM -170, but sometimes hypo to 59. If she does not take insulin for correction before meals, she often has FSG in 200s before the next meal.    Complications: nephropathy, retinopathy s/p laser tx, neuropathy on lyrica.  Outpatient Endo:    PMH & Surgical Hx:HYPERGLYCEMIA  H/o or current diagnosis of HF- ACEI/ARB contraindication unknown  No pertinent family history in first degree relatives  Handoff  MEWS Score  HTN (hypertension)  Pagets Disease, Breast  CHF (Congestive Heart Failure)  Breast Cancer  DM (Diabetes Mellitus)  ESRD on Dialysis  CAD (Coronary Artery Disease)  Hyperglycemia  Surgery, elective  left AV graft revision  x 3 times , last one 2010  History of Cholecystectomy  HYPERGLYCEMIA  90+  Thrombosis of arteriovenous fistula, subsequent encounter      FH: unknown      SH:  Smoking: denies  Etoh: denies  Recreational Drugs: denies    Current Meds:      Allergies:  penicillins (Swelling)      ROS:  Unable to obtain due to sedation. No complaints of pain. Resting comfortably.    Vital Signs Last 24 Hrs  T(C): 36.8 (31 May 2019 06:32), Max: 36.8 (31 May 2019 06:32)  T(F): 98.3 (31 May 2019 06:32), Max: 98.3 (31 May 2019 06:32)  HR: 69 (31 May 2019 05:07) (69 - 73)  BP: 123/64 (31 May 2019 05:07) (123/64 - 138/79)  BP(mean): --  RR: 18 (31 May 2019 05:07) (18 - 18)  SpO2: 99% (31 May 2019 05:07) (97% - 100%)  Height (cm): 168.91 (05-30 @ 15:24)  Weight (kg): 75.5 (05-30 @ 18:10)  BMI (kg/m2): 26.5 (05-30 @ 18:10)      Constitutional: wn/wd in NAD.   HEENT: NCAT, MMM, OP clear, EOMI, , no proptosis or lid retraction  Neck: no thyromegaly or palpable thyroid nodules   Respiratory: lungs CTAB.  Cardiovascular: regular rhythm, normal S1 and S2, no audible murmurs, no peripheral edema  GI: soft, NT/ND, no masses/HSM appreciated.  Neurology: no tremors, DTR 2+  Skin: + dry skin. no visible rashes/lesions  Psychiatric: AAO x 3, normal affect/mood.  Ext: Left arm wrapped. radial pulses intact, DP pulses intact, extremities warm, no cyanosis, clubbing or edema.       LABS:                        9.3    7.83  )-----------( 78       ( 31 May 2019 02:50 )             30.4     05-31    132<L>  |  94<L>  |  64<H>  ----------------------------<  390<H>  4.7   |  20<L>  |  6.02<H>    Ca    8.2<L>      31 May 2019 02:50  Phos  4.4     05-31  Mg     2.1     05-31      RADIOLOGY & ADDITIONAL STUDIES:  CAPILLARY BLOOD GLUCOSE    POCT Blood Glucose.: 133 mg/dL (31 May 2019 09:21)  POCT Blood Glucose.: 205 mg/dL (31 May 2019 07:26)  POCT Blood Glucose.: 247 mg/dL (31 May 2019 05:02)  POCT Blood Glucose.: 358 mg/dL (31 May 2019 02:05)  POCT Blood Glucose.: 492 mg/dL (30 May 2019 22:22)  POCT Blood Glucose.: 469 mg/dL (30 May 2019 20:47)  POCT Blood Glucose.: 505 mg/dL (30 May 2019 19:11)  POCT Blood Glucose.: 524 mg/dL (30 May 2019 17:50)  POCT Blood Glucose.: 560 mg/dL (30 May 2019 15:47)  POCT Blood Glucose.: 571 mg/dL (30 May 2019 15:42)  POCT Blood Glucose.: 558 mg/dL (30 May 2019 14:45)  POCT Blood Glucose.: 579 mg/dL (30 May 2019 14:33)      A/P: 77 yo F with thrombosed LUE AVF, admitted for pre-op steroid induced hyperglycemia management now s/p LUE AVF thrombectomy.    1.  DM - type 2, complicated  Please start levemir 12 units at night.    Please continue lispro low dose sliding scale four times daily with meals and at bedtime.  Please check 3AM FSG and correct if needed with lispro FINN.  When pt resumes diet and is eating adequately, please continue lispro 2  units before each meal.    Please check fructosamine.    Pt's fingerstick glucose goal is 100-180.    Will continue to monitor     For discharge, pt can continue, basal/bolus dose TBD    Pt can follow up at discharge with [facility] by calling  to make an appointment.   Will discuss case with Dr. Hamilton   and update primary team

## 2019-05-31 NOTE — CONSULT NOTE ADULT - ATTENDING COMMENTS
ESRD  seen in PACU, s/p revision of left arm AV access  pt awake, but BP on low side- on low dose pressor  as above volume status and potassium okay with low BP, so will defer HD tonight- schedule for dialysis in AM

## 2019-05-31 NOTE — CONSULT NOTE ADULT - SUBJECTIVE AND OBJECTIVE BOX
Renal consult  Patient is a 77 yo AAF with PMH of ESRD on HD currently via L IJ HD catheter, breast cancer s/p L mastectomy CAD s/p stent who was admitted for AVG revision which was done today.   She underwent open thrombectomy.   SHE did receive 1 L of crystalloids along with 500 m of 5 % albumin.   Operative course complicated by hypotension, for which she is still on phenylephrine.  Nephrology consult is placed in regards to ESRD on HD.   She has been on HD since 2010. last session on 5/29. Dry weight @ 75 kg         PAST MEDICAL & SURGICAL HISTORY:  HTN (hypertension)  Pagets Disease, Breast: left breast  CHF (Congestive Heart Failure): in the past  Breast Cancer: left breast  DM (Diabetes Mellitus): pt on insulin  ESRD on Dialysis: monday / wedensday/friday  CAD (Coronary Artery Disease): s/p drug eluting stent insertion in may 2010  Surgery, elective: mastectomy Left breast with lymph node removal  left AV graft revision  x 3 times , last one 2010  History of Cholecystectomy: in 1990        Allergies and Intolerances:        Allergies:  	penicillins: Drug Category, Swelling  	IV dye  can not be used secondary to ESRD: Miscellaneous, Other, IV dye  can not be used secondary to ESRD    Home Medications:   * Patient Currently Takes Medications as of 30-May-2019 20:50 documented in Structured Notes  · 	oxyCODONE-acetaminophen 5 mg-325 mg oral tablet: Last Dose Taken:  , 1 tab(s) orally every 6 hours, As Needed -for moderate pain   	  	2 tabs orally greg 6 hours for severe pain. MDD:8  · 	acetaminophen 325 mg oral tablet: Last Dose Taken:  , 2 tab(s) orally every 6 hours, As needed, Moderate Pain (4 - 6)  · 	Eliquis 2.5 mg oral tablet: Last Dose Taken:  , 1 tab(s) orally 2 times a day  · 	metoprolol tartrate 75 mg oral tablet: Last Dose Taken:  , 1 tab(s) orally 2 times a day  · 	Levemir: Last Dose Taken:  , 10 unit(s) subcutaneous once a day (at bedtime)  · 	HumaLOG: Last Dose Taken:  , 5 unit(s) subcutaneous once a day  · 	Protonix 40 mg oral delayed release tablet: Last Dose Taken:  , 1 tab(s) orally once a day  · 	NIFEdipine 60 mg oral tablet, extended release: Last Dose Taken:  , 1 tab(s) orally once a day  · 	Multiple Vitamins oral tablet: Last Dose Taken:  , 1 tab(s) orally once a day  · 	thiamine 100 mg oral tablet: Last Dose Taken:  , 1 tab(s) orally once a day  · 	Vitamin D2 50,000 intl units (1.25 mg) oral capsule: Last Dose Taken:  , 1 cap(s) orally once a day  · 	Vitamin B12 1000 mcg oral tablet: Last Dose Taken:  , 1 tab(s) orally once a day  · 	heparin 1 unit/mL-NaCl 0.225% intravenous solution: Last Dose Taken:  , 2000 unit(s) intravenous 3 times a week after dialysis  · 	Renvela 800 mg oral tablet: Last Dose Taken:  , 2 tab(s) orally 3 times a day (with meals)  · 	Coreg 6.25 mg oral tablet: Last Dose Taken:  , 1 tab(s) orally 2 times a day  · 	Zemplar 4 mcg oral capsule: Last Dose Taken:  , 1 cap(s) orally 3 times a week after dialysis  · 	Sensipar 30 mg oral tablet: Last Dose Taken:  , 1 tab(s) orally once a day  · 	Lyrica 50 mg oral capsule: Last Dose Taken:  , 1 cap(s) orally 2 times a day  · 	Alpha Lipoic Acid 100 mg oral tablet: Last Dose Taken:  , 5 tab(s) orally once a day  · 	predniSONE 50 mg oral tablet: Last Dose Taken:  , 1 tab(s) orally once a day for CONTRAST ALLERGY            FAMILY HISTORY:  No pertinent family history in first degree relatives      SOCIAL HISTORY: NA    MEDICATIONS  (STANDING):  aspirin  chewable 81 milliGRAM(s) Oral daily  cinacalcet 30 milliGRAM(s) Oral daily  clopidogrel Tablet 75 milliGRAM(s) Oral daily  heparin  Injectable 5000 Unit(s) SubCutaneous every 8 hours  insulin lispro (HumaLOG) corrective regimen sliding scale   SubCutaneous Before meals and at bedtime  multivitamin 1 Tablet(s) Oral daily  pantoprazole    Tablet 40 milliGRAM(s) Oral before breakfast  phenylephrine    Infusion 0.106 MICROgram(s)/kG/Min (3 mL/Hr) IV Continuous <Continuous>  pregabalin 75 milliGRAM(s) Oral daily  sevelamer carbonate 1600 milliGRAM(s) Oral three times a day with meals  sodium chloride 0.9%. 1000 milliLiter(s) (20 mL/Hr) IV Continuous <Continuous>    MEDICATIONS  (PRN):  acetaminophen   Tablet .. 650 milliGRAM(s) Oral every 6 hours PRN Moderate Pain (4 - 6)      REVIEW OF SYSTEMS:    NA        PHYSICAL EXAM:  GENERAL: drowsy, NC 3 L  HEAD:  Atraumatic, Normocephalic,   EYES: Bilateral conjunctiva and sclera normal,  Oral cavity: Oral mucosa dry and pink  NECK: Neck supple, No JVD  CHEST/LUNG: unable to assess lung exam, L mastectomy, L IJ HD cath   HEART: Regular rate and rhythm. RYAN II/VI at LPSB, No gallop, no rub   ABDOMEN: Soft, Nontender, BS+nt, No flank tenderness.   EXTREMITIES: NO LE edema, LUE wrapped, AVG with thrill and bruit  Neurology: AAOx3, drowsy  SKIN: No rashes or lesions      CAPILLARY BLOOD GLUCOSE      POCT Blood Glucose.: 193 mg/dL (31 May 2019 14:28)  POCT Blood Glucose.: 165 mg/dL (31 May 2019 14:17)  POCT Blood Glucose.: 115 mg/dL (31 May 2019 12:56)  POCT Blood Glucose.: 100 mg/dL (31 May 2019 12:28)  POCT Blood Glucose.: 120 mg/dL (31 May 2019 10:51)  POCT Blood Glucose.: 133 mg/dL (31 May 2019 09:21)  POCT Blood Glucose.: 205 mg/dL (31 May 2019 07:26)  POCT Blood Glucose.: 247 mg/dL (31 May 2019 05:02)  POCT Blood Glucose.: 358 mg/dL (31 May 2019 02:05)  POCT Blood Glucose.: 492 mg/dL (30 May 2019 22:22)  POCT Blood Glucose.: 469 mg/dL (30 May 2019 20:47)  POCT Blood Glucose.: 505 mg/dL (30 May 2019 19:11)  POCT Blood Glucose.: 524 mg/dL (30 May 2019 17:50)      I&O's Summary    31 May 2019 07:01  -  31 May 2019 16:47  --------------------------------------------------------  IN: 68 mL / OUT: 0 mL / NET: 68 mL          LABS:                                                   05-31-19 @ 14:59    136  |  100  |  63<H>  ----------------------------<  177<H>  4.4   |  20<L>  |  6.03<H>                                                 05-31-19 @ 02:50    132<L>  |  94<L>  |  64<H>  ----------------------------<  390<H>  4.7   |  20<L>  |  6.02<H>                                                 05-30-19 @ 18:02    131<L>  |  91<L>  |  57<H>  ----------------------------<  572<HH>  4.6   |  23  |  5.59<H>                                                 05-30-19 @ 14:52    130<L>  |  89<L>  |  51<H>  ----------------------------<  631<HH>  5.1   |  19<L>  |  5.26<H>    Ca    7.2<L>      31 May 2019 14:59  Ca    8.2<L>      31 May 2019 02:50  Ca    8.2<L>      30 May 2019 18:02  Ca    8.2<L>      30 May 2019 14:52  Phos  4.0     05-31  Phos  4.4     05-31  Mg     1.8     05-31  Mg     2.1     05-31                            7.9    8.63  )-----------( 79       ( 31 May 2019 14:59 )             25.5     CBC Full  -  ( 31 May 2019 14:59 )  WBC Count : 8.63 K/uL  Hemoglobin : 7.9 g/dL  Hematocrit : 25.5 %  Platelet Count - Automated : 79 K/uL  Mean Cell Volume : 100.4 fl      CBC Full  -  ( 31 May 2019 02:50 )  WBC Count : 7.83 K/uL  Hemoglobin : 9.3 g/dL  Hematocrit : 30.4 %  Platelet Count - Automated : 78 K/uL  Mean Cell Volume : 102.4 fl      CBC Full  -  ( 30 May 2019 18:02 )  WBC Count : 7.12 K/uL  Hemoglobin : 8.9 g/dL  Hematocrit : 29.2 %  Platelet Count - Automated : 83 K/uL  Mean Cell Volume : 100.3 fl                  RADIOLOGY & ADDITIONAL TESTS:    EKG/Telemetry: Reviewed

## 2019-05-31 NOTE — CONSULT NOTE ADULT - REASON FOR ADMISSION
hyperglycemia, thrombosed left arm AV fistula

## 2019-05-31 NOTE — CONSULT NOTE ADULT - CONSULT REQUESTED DATE/TIME
31-May-2019 16:47

## 2019-05-31 NOTE — PROGRESS NOTE ADULT - SUBJECTIVE AND OBJECTIVE BOX
O/N: 2 am , refused 10 units, would only take 5 units, K= 4.7, endo made aware of 2 am labs, still no DKA appreciated, AM   5/30: admitted for pre op hyperglycemia managmenent, endo recs appreciated: levemir 12 tonight, lispro 4 tonight w/dinner, moderate sliding scale, Q4H finger sticks, change sliding scale to low once FS < 200        79 yo F with thrombosed LUE AVF, admitted for pre-op hyperglycemia management.    - admit to vasc surg, tele, Dr. Triplett  - home meds, but hold Eliquis; no more need for steroids  - renal/diabetic diet now, NPO past midnight  - I/Os  - endocrine consult: levemir 12 tonight, lispro 4 tonight w/dinner, moderate sliding scale, Q4H finger sticks, change sliding scale to low once FS < 200  - SQH okay  - 2 am labs w/K  - NPO past midnight, pre-op (pt already consented) for OR tomorrow, 5/31, with Dr. Triplett (left arm AV fistula open thrombectomy with possible endovascular angioplasty/stent) O/N: 2 am , refused 10 units, would only take 5 units, K= 4.7, endo made aware of 2 am labs, still no DKA appreciated, AM   5/30: admitted for pre op hyperglycemia managmenent, endo recs appreciated: levemir 12 tonight, lispro 4 tonight w/dinner, moderate sliding scale, Q4H finger sticks, change sliding scale to low once FS < 200    Vital Signs Last 24 Hrs  T(C): 36.8 (31 May 2019 06:32), Max: 36.8 (31 May 2019 06:32)  T(F): 98.3 (31 May 2019 06:32), Max: 98.3 (31 May 2019 06:32)  HR: 69 (31 May 2019 05:07) (69 - 73)  BP: 123/64 (31 May 2019 05:07) (123/64 - 138/79)  BP(mean): --  RR: 18 (31 May 2019 05:07) (18 - 18)  SpO2: 99% (31 May 2019 05:07) (97% - 100%)    	A/O x 3, normal motor/sensory, no deficits  	normal heart rate  	normal resp effort  no thrill in LUE AVF    77 yo F with thrombosed LUE AVF, admitted for pre-op hyperglycemia management.    - admit to vasc surg, tele, Dr. Triplett  - home meds, but hold Eliquis; no more need for steroids  - NPO  - I/Os  - SQH okay  - OR today

## 2019-06-01 DIAGNOSIS — T82.868D THROMBOSIS DUE TO VASCULAR PROSTHETIC DEVICES, IMPLANTS AND GRAFTS, SUBSEQUENT ENCOUNTER: ICD-10-CM

## 2019-06-01 DIAGNOSIS — N18.6 END STAGE RENAL DISEASE: ICD-10-CM

## 2019-06-01 LAB
ANION GAP SERPL CALC-SCNC: 18 MMOL/L — HIGH (ref 5–17)
BUN SERPL-MCNC: 66 MG/DL — HIGH (ref 7–23)
CALCIUM SERPL-MCNC: 7.3 MG/DL — LOW (ref 8.4–10.5)
CHLORIDE SERPL-SCNC: 98 MMOL/L — SIGNIFICANT CHANGE UP (ref 96–108)
CO2 SERPL-SCNC: 17 MMOL/L — LOW (ref 22–31)
CREAT SERPL-MCNC: 7.04 MG/DL — HIGH (ref 0.5–1.3)
FRUCTOSAMINE SERPL-MCNC: 455 UMOL/L — HIGH (ref 205–285)
GLUCOSE BLDC GLUCOMTR-MCNC: 150 MG/DL — HIGH (ref 70–99)
GLUCOSE BLDC GLUCOMTR-MCNC: 199 MG/DL — HIGH (ref 70–99)
GLUCOSE BLDC GLUCOMTR-MCNC: 206 MG/DL — HIGH (ref 70–99)
GLUCOSE BLDC GLUCOMTR-MCNC: 208 MG/DL — HIGH (ref 70–99)
GLUCOSE BLDC GLUCOMTR-MCNC: 277 MG/DL — HIGH (ref 70–99)
GLUCOSE SERPL-MCNC: 215 MG/DL — HIGH (ref 70–99)
HBA1C BLD-MCNC: 8.7 % — HIGH (ref 4–5.6)
HBV CORE AB SER-ACNC: REACTIVE
HBV SURFACE AB SER-ACNC: SIGNIFICANT CHANGE UP
HBV SURFACE AG SER-ACNC: SIGNIFICANT CHANGE UP
HCT VFR BLD CALC: 27.9 % — LOW (ref 34.5–45)
HCV AB S/CO SERPL IA: 7.75 S/CO — SIGNIFICANT CHANGE UP
HCV AB SERPL-IMP: REACTIVE
HGB BLD-MCNC: 8.6 G/DL — LOW (ref 11.5–15.5)
MAGNESIUM SERPL-MCNC: 2 MG/DL — SIGNIFICANT CHANGE UP (ref 1.6–2.6)
MCHC RBC-ENTMCNC: 30.8 GM/DL — LOW (ref 32–36)
MCHC RBC-ENTMCNC: 30.9 PG — SIGNIFICANT CHANGE UP (ref 27–34)
MCV RBC AUTO: 100.4 FL — HIGH (ref 80–100)
NRBC # BLD: 0 /100 WBCS — SIGNIFICANT CHANGE UP (ref 0–0)
PHOSPHATE SERPL-MCNC: 4.3 MG/DL — SIGNIFICANT CHANGE UP (ref 2.5–4.5)
PLATELET # BLD AUTO: 99 K/UL — LOW (ref 150–400)
POTASSIUM SERPL-MCNC: 4.6 MMOL/L — SIGNIFICANT CHANGE UP (ref 3.5–5.3)
POTASSIUM SERPL-SCNC: 4.6 MMOL/L — SIGNIFICANT CHANGE UP (ref 3.5–5.3)
RBC # BLD: 2.78 M/UL — LOW (ref 3.8–5.2)
RBC # FLD: 14.6 % — HIGH (ref 10.3–14.5)
SODIUM SERPL-SCNC: 133 MMOL/L — LOW (ref 135–145)
WBC # BLD: 7.2 K/UL — SIGNIFICANT CHANGE UP (ref 3.8–10.5)
WBC # FLD AUTO: 7.2 K/UL — SIGNIFICANT CHANGE UP (ref 3.8–10.5)

## 2019-06-01 PROCEDURE — 99233 SBSQ HOSP IP/OBS HIGH 50: CPT | Mod: GC

## 2019-06-01 RX ORDER — MIDODRINE HYDROCHLORIDE 2.5 MG/1
5 TABLET ORAL EVERY 8 HOURS
Refills: 0 | Status: DISCONTINUED | OUTPATIENT
Start: 2019-06-01 | End: 2019-06-02

## 2019-06-01 RX ORDER — INSULIN LISPRO 100/ML
2 VIAL (ML) SUBCUTANEOUS
Refills: 0 | Status: DISCONTINUED | OUTPATIENT
Start: 2019-06-01 | End: 2019-06-01

## 2019-06-01 RX ORDER — INSULIN LISPRO 100/ML
4 VIAL (ML) SUBCUTANEOUS
Refills: 0 | Status: DISCONTINUED | OUTPATIENT
Start: 2019-06-01 | End: 2019-06-03

## 2019-06-01 RX ORDER — VANCOMYCIN HCL 1 G
1000 VIAL (EA) INTRAVENOUS ONCE
Refills: 0 | Status: COMPLETED | OUTPATIENT
Start: 2019-06-01 | End: 2019-06-01

## 2019-06-01 RX ORDER — PHENYLEPHRINE HYDROCHLORIDE 10 MG/ML
0.11 INJECTION INTRAVENOUS
Qty: 40 | Refills: 0 | Status: DISCONTINUED | OUTPATIENT
Start: 2019-06-01 | End: 2019-06-02

## 2019-06-01 RX ORDER — ERYTHROPOIETIN 10000 [IU]/ML
10000 INJECTION, SOLUTION INTRAVENOUS; SUBCUTANEOUS ONCE
Refills: 0 | Status: COMPLETED | OUTPATIENT
Start: 2019-06-01 | End: 2019-06-01

## 2019-06-01 RX ADMIN — MIDODRINE HYDROCHLORIDE 5 MILLIGRAM(S): 2.5 TABLET ORAL at 21:21

## 2019-06-01 RX ADMIN — HEPARIN SODIUM 5000 UNIT(S): 5000 INJECTION INTRAVENOUS; SUBCUTANEOUS at 17:30

## 2019-06-01 RX ADMIN — Medication 75 MILLIGRAM(S): at 17:29

## 2019-06-01 RX ADMIN — Medication 2: at 06:36

## 2019-06-01 RX ADMIN — SEVELAMER CARBONATE 1600 MILLIGRAM(S): 2400 POWDER, FOR SUSPENSION ORAL at 07:46

## 2019-06-01 RX ADMIN — SEVELAMER CARBONATE 1600 MILLIGRAM(S): 2400 POWDER, FOR SUSPENSION ORAL at 12:38

## 2019-06-01 RX ADMIN — ERYTHROPOIETIN 10000 UNIT(S): 10000 INJECTION, SOLUTION INTRAVENOUS; SUBCUTANEOUS at 11:30

## 2019-06-01 RX ADMIN — Medication 1: at 21:41

## 2019-06-01 RX ADMIN — Medication 12 UNIT(S): at 01:51

## 2019-06-01 RX ADMIN — Medication 2: at 17:40

## 2019-06-01 RX ADMIN — MIDODRINE HYDROCHLORIDE 5 MILLIGRAM(S): 2.5 TABLET ORAL at 09:16

## 2019-06-01 RX ADMIN — CINACALCET 30 MILLIGRAM(S): 30 TABLET, FILM COATED ORAL at 17:30

## 2019-06-01 RX ADMIN — CLOPIDOGREL BISULFATE 75 MILLIGRAM(S): 75 TABLET, FILM COATED ORAL at 17:29

## 2019-06-01 RX ADMIN — SEVELAMER CARBONATE 1600 MILLIGRAM(S): 2400 POWDER, FOR SUSPENSION ORAL at 17:30

## 2019-06-01 RX ADMIN — MIDODRINE HYDROCHLORIDE 5 MILLIGRAM(S): 2.5 TABLET ORAL at 17:29

## 2019-06-01 RX ADMIN — HEPARIN SODIUM 5000 UNIT(S): 5000 INJECTION INTRAVENOUS; SUBCUTANEOUS at 21:21

## 2019-06-01 RX ADMIN — Medication 250 MILLIGRAM(S): at 21:21

## 2019-06-01 RX ADMIN — Medication 81 MILLIGRAM(S): at 17:29

## 2019-06-01 RX ADMIN — PANTOPRAZOLE SODIUM 40 MILLIGRAM(S): 20 TABLET, DELAYED RELEASE ORAL at 06:35

## 2019-06-01 RX ADMIN — Medication 12 UNIT(S): at 22:57

## 2019-06-01 RX ADMIN — HEPARIN SODIUM 5000 UNIT(S): 5000 INJECTION INTRAVENOUS; SUBCUTANEOUS at 06:35

## 2019-06-01 NOTE — PATIENT PROFILE ADULT - NSPROEDALEARNPREF_GEN_A_NUR
computer/internet/group instruction/written material/video/skill demonstration/verbal instruction/audio

## 2019-06-01 NOTE — PATIENT PROFILE ADULT - NSPROHMDIABETBLDGLCTST_GEN_A_NUR
No results found for: ABORH, LABANTI, ABID    Chief Complaint   Patient presents with   • Routine Prenatal Visit     no problem       Today Enrique has no specific complaints  See ob flowsheet for physical exam.    Prenatal Assessment  Fetal Heart Rate: 129  Movement: Present  Prenatal Vitals  BP: 130/70  Weight: 229 lb (104 kg)  Urine Glucose/Protein  Urine Glucose: Negative  Urine Protein: Negative  Edema  LLE Edema: None  RLE Edema: None  Facial Edema: None     Tests done today: none  At the time of the next visit, she will need to have none    Impression:   Encounter Diagnoses   Name Primary?   • Encounter for supervision of normal first pregnancy in third trimester Yes       Plan: return 2 weeks    This note was electronically signed.    Bhavesh Armendariz MD   
3 times/day

## 2019-06-01 NOTE — DIETITIAN INITIAL EVALUATION ADULT. - ENERGY NEEDS
Ht (6/1 per pt): 170.18cm, Wt (5/30): 166lb, IBW:135lb +/-10%, %IBW: 123%, BMI: 26  IBW used to calculate energy needs due to pt's current body weight exceeding 120% of IBW. Needs adjusted for age, on HD. Fluid needs per team 2/2 renal function, hyponatremia.

## 2019-06-01 NOTE — PROGRESS NOTE ADULT - SUBJECTIVE AND OBJECTIVE BOX
CC: HYPERGLYCEMIA      INTERVAL HISTORY:  some discomfort in Left arm s/p procedure  no other complaints      ROS: No chest pain, no sob, no abd pain. No n/v/d    PAST MEDICAL & SURGICAL HISTORY:  HTN (hypertension)  Pagets Disease, Breast: left breast  CHF (Congestive Heart Failure): in the past  Breast Cancer: left breast  DM (Diabetes Mellitus): pt on insulin  ESRD on Dialysis: monday / wedensday/friday  CAD (Coronary Artery Disease): s/p drug eluting stent insertion in may 2010  Surgery, elective: mastectomy Left breast with lymph node removal  left AV graft revision  x 3 times , last one 2010  History of Cholecystectomy: in 1990      PHYSICAL EXAM:  T(C): 36.4 (06-01-19 @ 00:37), Max: 36.4 (06-01-19 @ 00:37)  HR: 68 (06-01-19 @ 07:00)  BP: 104/48 (06-01-19 @ 07:00) (90/43 - 129/51)  RR: 13 (06-01-19 @ 07:00)  SpO2: 100% (06-01-19 @ 07:00)  Wt(kg): --  I&O's Summary    31 May 2019 07:01  -  01 Jun 2019 07:00  --------------------------------------------------------  IN: 972.2 mL / OUT: 1 mL / NET: 971.2 mL    01 Jun 2019 07:01  -  01 Jun 2019 07:56  --------------------------------------------------------  IN: 34.2 mL / OUT: 0 mL / NET: 34.2 mL      Weight 75.5 (05-30 @ 18:10), 75.3 (05-30 @ 15:24)  General: AAO x 3,  NAD.  HEENT: moist mucous membranes, no pallor/cyanosis.  Neck: no JVD visible.  Cardiac: S1, S2. RRR. No murmurs   Respratory: CTA b/l, no access muscle use.   Abdomen: soft. nontender. nondistended  Skin: no rashes.  Extremities: no LE edema b/l  Access: Permacath      DATA:                        8.6<L>  7.20  )-----------( 99<L>    ( 01 Jun 2019 06:22 )             27.9<L>        133<L>  |  98     |  66<H>  ----------------------------<  215<H>  Ca:7.3<L> (01 Jun 2019 06:22)  4.6     |  17<L>  |  7.04<H>      eGFR if Non : 5 <L>  eGFR if : 6 <L>                        MEDICATIONS  (STANDING):  aspirin  chewable 81 milliGRAM(s) Oral daily  cinacalcet 30 milliGRAM(s) Oral daily  clopidogrel Tablet 75 milliGRAM(s) Oral daily  epoetin silvia Injectable 83912 Unit(s) IV Push once  heparin  Injectable 5000 Unit(s) SubCutaneous every 8 hours  insulin detemir injectable (LEVEMIR) 12 Unit(s) SubCutaneous at bedtime  insulin lispro (HumaLOG) corrective regimen sliding scale   SubCutaneous Before meals and at bedtime  insulin lispro (HumaLOG) corrective regimen sliding scale   SubCutaneous <User Schedule>  multivitamin 1 Tablet(s) Oral daily  pantoprazole    Tablet 40 milliGRAM(s) Oral before breakfast  phenylephrine    Infusion 0.106 MICROgram(s)/kG/Min (3 mL/Hr) IV Continuous <Continuous>  pregabalin 75 milliGRAM(s) Oral daily  sevelamer carbonate 1600 milliGRAM(s) Oral three times a day with meals  sodium chloride 0.9%. 1000 milliLiter(s) (20 mL/Hr) IV Continuous <Continuous>    MEDICATIONS  (PRN):  acetaminophen   Tablet .. 650 milliGRAM(s) Oral every 6 hours PRN Moderate Pain (4 - 6)  HYDROmorphone  Injectable 0.5 milliGRAM(s) IV Push every 4 hours PRN Severe Pain (7 - 10)

## 2019-06-01 NOTE — PROGRESS NOTE ADULT - SUBJECTIVE AND OBJECTIVE BOX
INTERVAL HPI/OVERNIGHT EVENTS:    Patient is a 78y old  Female who presents with a chief complaint of hyperglycemia, thrombosed left arm AV fistula (01 Jun 2019 14:18)      Pt reports the following symptoms:    CONSTITUTIONAL:  Negative fever or chills, feels well, good appetite  EYES:  Negative  blurry vision or double vision  CARDIOVASCULAR:  Negative for chest pain or palpitations  RESPIRATORY:  Negative for cough, wheezing, or SOB   GASTROINTESTINAL:  Negative for nausea, vomiting, diarrhea, constipation, or abdominal pain  GENITOURINARY:  Negative frequency, urgency or dysuria  NEUROLOGIC:  No headache, confusion, dizziness, lightheadedness    MEDICATIONS  (STANDING):  aspirin  chewable 81 milliGRAM(s) Oral daily  cinacalcet 30 milliGRAM(s) Oral daily  clopidogrel Tablet 75 milliGRAM(s) Oral daily  heparin  Injectable 5000 Unit(s) SubCutaneous every 8 hours  insulin detemir injectable (LEVEMIR) 12 Unit(s) SubCutaneous at bedtime  insulin lispro (HumaLOG) corrective regimen sliding scale   SubCutaneous Before meals and at bedtime  insulin lispro (HumaLOG) corrective regimen sliding scale   SubCutaneous <User Schedule>  insulin lispro Injectable (HumaLOG) 4 Unit(s) SubCutaneous three times a day before meals  midodrine 5 milliGRAM(s) Oral every 8 hours  Nephro-clint 1 Tablet(s) Oral daily  pantoprazole    Tablet 40 milliGRAM(s) Oral before breakfast  phenylephrine    Infusion 0.106 MICROgram(s)/kG/Min (3 mL/Hr) IV Continuous <Continuous>  pregabalin 75 milliGRAM(s) Oral daily  sevelamer carbonate 1600 milliGRAM(s) Oral three times a day with meals  sodium chloride 0.9%. 1000 milliLiter(s) (20 mL/Hr) IV Continuous <Continuous>    MEDICATIONS  (PRN):  acetaminophen   Tablet .. 650 milliGRAM(s) Oral every 6 hours PRN Moderate Pain (4 - 6)  HYDROmorphone  Injectable 0.5 milliGRAM(s) IV Push every 4 hours PRN Severe Pain (7 - 10)      Past medical history reviewed  Family history reviewed  Social history reviewed    PHYSICAL EXAM  Vital Signs Last 24 Hrs  T(C): 37.2 (01 Jun 2019 22:11), Max: 37.2 (01 Jun 2019 17:22)  T(F): 98.9 (01 Jun 2019 22:11), Max: 99 (01 Jun 2019 17:22)  HR: 80 (02 Jun 2019 00:00) (62 - 90)  BP: 135/60 (02 Jun 2019 00:00) (90/44 - 135/74)  BP(mean): 90 (02 Jun 2019 00:00) (58 - 90)  RR: 14 (02 Jun 2019 00:00) (8 - 22)  SpO2: 99% (02 Jun 2019 00:00) (93% - 100%)    Constitutional: wn/wd in NAD.   HEENT: NCAT, MMM, OP clear, EOMI, no proptosis or lid retraction  Neck: no thyromegaly or palpable thyroid nodules   Respiratory: lungs CTAB.  Cardiovascular: regular rhythm, normal S1 and S2, no audible murmurs, no peripheral edema  GI: soft, NT/ND, no masses/HSM appreciated.  Neurology: no tremors, DTR 2+  Skin: no visible rashes/lesions  Psychiatric: AAO x 3, normal affect/mood.    LABS:                        8.6    7.20  )-----------( 99       ( 01 Jun 2019 06:22 )             27.9     06-01    133<L>  |  98  |  66<H>  ----------------------------<  215<H>  4.6   |  17<L>  |  7.04<H>    Ca    7.3<L>      01 Jun 2019 06:22  Phos  4.3     06-01  Mg     2.0     06-01              HbA1C: 8.7 % (06-01 @ 06:23)    CAPILLARY BLOOD GLUCOSE      POCT Blood Glucose.: 277 mg/dL (01 Jun 2019 23:01)  POCT Blood Glucose.: 199 mg/dL (01 Jun 2019 21:21)  POCT Blood Glucose.: 206 mg/dL (01 Jun 2019 17:26)  POCT Blood Glucose.: 150 mg/dL (01 Jun 2019 12:35)  POCT Blood Glucose.: 208 mg/dL (01 Jun 2019 06:13) INTERVAL HPI/OVERNIGHT EVENTS:    Patient is a 78y old  Female who presents with a chief complaint of hyperglycemia, thrombosed left arm AV fistula (01 Jun 2019 14:18)  no acute overnight events  pt eating about25% of tray  insulin administration reviewed  denies nausea, vomiting, abdominal pain, SOB, chest pain palpitations, urinary sx, headache, dizziness, subjective fever, chills    Pt reports the following symptoms:    CONSTITUTIONAL:  Negative fever or chills, feels well, good appetite  EYES:  Negative  blurry vision or double vision  CARDIOVASCULAR:  Negative for chest pain or palpitations  RESPIRATORY:  Negative for cough, wheezing, or SOB   GASTROINTESTINAL:  Negative for nausea, vomiting, diarrhea, constipation, or abdominal pain  GENITOURINARY:  Negative frequency, urgency or dysuria  NEUROLOGIC:  No headache, confusion, dizziness, lightheadedness    MEDICATIONS  (STANDING):  aspirin  chewable 81 milliGRAM(s) Oral daily  cinacalcet 30 milliGRAM(s) Oral daily  clopidogrel Tablet 75 milliGRAM(s) Oral daily  heparin  Injectable 5000 Unit(s) SubCutaneous every 8 hours  insulin detemir injectable (LEVEMIR) 12 Unit(s) SubCutaneous at bedtime  insulin lispro (HumaLOG) corrective regimen sliding scale   SubCutaneous Before meals and at bedtime  insulin lispro (HumaLOG) corrective regimen sliding scale   SubCutaneous <User Schedule>  insulin lispro Injectable (HumaLOG) 4 Unit(s) SubCutaneous three times a day before meals  midodrine 5 milliGRAM(s) Oral every 8 hours  Nephro-clint 1 Tablet(s) Oral daily  pantoprazole    Tablet 40 milliGRAM(s) Oral before breakfast  phenylephrine    Infusion 0.106 MICROgram(s)/kG/Min (3 mL/Hr) IV Continuous <Continuous>  pregabalin 75 milliGRAM(s) Oral daily  sevelamer carbonate 1600 milliGRAM(s) Oral three times a day with meals  sodium chloride 0.9%. 1000 milliLiter(s) (20 mL/Hr) IV Continuous <Continuous>    MEDICATIONS  (PRN):  acetaminophen   Tablet .. 650 milliGRAM(s) Oral every 6 hours PRN Moderate Pain (4 - 6)  HYDROmorphone  Injectable 0.5 milliGRAM(s) IV Push every 4 hours PRN Severe Pain (7 - 10)      Past medical history reviewed  Family history reviewed  Social history reviewed    PHYSICAL EXAM  Vital Signs Last 24 Hrs  T(C): 37.2 (01 Jun 2019 22:11), Max: 37.2 (01 Jun 2019 17:22)  T(F): 98.9 (01 Jun 2019 22:11), Max: 99 (01 Jun 2019 17:22)  HR: 80 (02 Jun 2019 00:00) (62 - 90)  BP: 135/60 (02 Jun 2019 00:00) (90/44 - 135/74)  BP(mean): 90 (02 Jun 2019 00:00) (58 - 90)  RR: 14 (02 Jun 2019 00:00) (8 - 22)  SpO2: 99% (02 Jun 2019 00:00) (93% - 100%)    Constitutional: wn/wd in NAD.   HEENT: NCAT, MMM, OP clear, EOMI, no proptosis or lid retraction  Neck: no thyromegaly or palpable thyroid nodules   Respiratory: lungs CTAB.  Cardiovascular: regular rhythm, normal S1 and S2, no audible murmurs, no peripheral edema  GI: soft, NT/ND, no masses/HSM appreciated.  Neurology: no tremors, DTR 2+  Skin: no visible rashes/lesions  Psychiatric: AAO x 3, normal affect/mood.    LABS:                        8.6    7.20  )-----------( 99       ( 01 Jun 2019 06:22 )             27.9     06-01    133<L>  |  98  |  66<H>  ----------------------------<  215<H>  4.6   |  17<L>  |  7.04<H>    Ca    7.3<L>      01 Jun 2019 06:22  Phos  4.3     06-01  Mg     2.0     06-01              HbA1C: 8.7 % (06-01 @ 06:23)    CAPILLARY BLOOD GLUCOSE      POCT Blood Glucose.: 277 mg/dL (01 Jun 2019 23:01)  POCT Blood Glucose.: 199 mg/dL (01 Jun 2019 21:21)  POCT Blood Glucose.: 206 mg/dL (01 Jun 2019 17:26)  POCT Blood Glucose.: 150 mg/dL (01 Jun 2019 12:35)  POCT Blood Glucose.: 208 mg/dL (01 Jun 2019 06:13)

## 2019-06-01 NOTE — PATIENT PROFILE ADULT - CENTRAL VENOUS CATHETER/PICC LINE
Shanice Pharmacist  At [facility] advised hydrocortisone 25 mg suppository are not covered under patient insurance and will cost about $200. Pharmacy would like to know if there is alternate medication you would to send. Please advise.     no

## 2019-06-01 NOTE — PROGRESS NOTE ADULT - SUBJECTIVE AND OBJECTIVE BOX
Subjective: Overall feeling well, no complaints. No left hand weakness/numbness/coolness. Left upper arm incision sore, but pain overall improving. No CP/SOA. No lightheadedness.      Vital Signs Last 24 Hrs  T(C): 36.6 (01 Jun 2019 13:50), Max: 36.7 (01 Jun 2019 10:10)  T(F): 97.9 (01 Jun 2019 13:50), Max: 98 (01 Jun 2019 10:10)  HR: 81 (01 Jun 2019 13:50) (62 - 81)  BP: 126/60 (01 Jun 2019 13:50) (90/43 - 135/74)  BP(mean): 75 (01 Jun 2019 11:00) (54 - 100)  RR: 18 (01 Jun 2019 13:50) (10 - 34)  SpO2: 100% (01 Jun 2019 13:50) (94% - 100%)    I&O's Summary    31 May 2019 07:01  -  01 Jun 2019 07:00  --------------------------------------------------------  IN: 972.2 mL / OUT: 1 mL / NET: 971.2 mL    01 Jun 2019 07:01  -  01 Jun 2019 14:19  --------------------------------------------------------  IN: 34.2 mL / OUT: 1800 mL / NET: -1765.8 mL        Physical Exam:  General: NAD, resting comfortably  Pulmonary: normal resp effort  Cardiovascular: NSR  Abdominal: soft, NT/ND  Extremities: WWP, normal strength.   Neuro: A/O x 3, no focal deficits, sensation normal  Pulses: audible bruit in LUE AVG, left rad art signal present by doppler. Left hand WWP.      LABS:                        8.6    7.20  )-----------( 99       ( 01 Jun 2019 06:22 )             27.9     06-01    133<L>  |  98  |  66<H>  ----------------------------<  215<H>  4.6   |  17<L>  |  7.04<H>    Ca    7.3<L>      01 Jun 2019 06:22  Phos  4.3     06-01  Mg     2.0     06-01            CAPILLARY BLOOD GLUCOSE      POCT Blood Glucose.: 150 mg/dL (01 Jun 2019 12:35)  POCT Blood Glucose.: 208 mg/dL (01 Jun 2019 06:13)  POCT Blood Glucose.: 248 mg/dL (31 May 2019 22:46)  POCT Blood Glucose.: 216 mg/dL (31 May 2019 21:41)  POCT Blood Glucose.: 231 mg/dL (31 May 2019 20:58)  POCT Blood Glucose.: 247 mg/dL (31 May 2019 16:52)  POCT Blood Glucose.: 193 mg/dL (31 May 2019 14:28)

## 2019-06-01 NOTE — PROGRESS NOTE ADULT - ASSESSMENT
77 yo F with HTN, IDDM, CAD s/p stent, breast ca s/p mastectomy, and ESRD on HD  s/p open thrombectomy and revision admitted to SICU for hypotension requiring pressor support

## 2019-06-01 NOTE — PROGRESS NOTE ADULT - ASSESSMENT
79 yo F w/ HTN, IDDM, CAD s/p stent, and ESRD on HD, s/p open thrombectomy of LUE AVG, partial graft resection with interposition graft, angioplasty of left subclavian/axillary veins, and stent of left axillary vein. Hemodynamically improving as pressors weaning down.     Plan:  - wean phenylephrine drip, MAP goal > 65  - HD per renal  - f/u endocrine recs (levemir 12, ISS)  - home meds (holding BB and ARB)  - per Dr. Triplett, no more need for Eliquis for now, may reconsider in future; ASA/Plavix only for now, SQH also okay  - prn pain control  - one more vanc dose w/ HD  - renal/DM diet  - rest of care per SICU  - d/w SICU team and Dr. Triplett

## 2019-06-01 NOTE — PROGRESS NOTE ADULT - SUBJECTIVE AND OBJECTIVE BOX
24 hr events:  6/1: FG low-mid 200s. Phenylephrine trending downward.  5/31: per endocrine, recs unchanged. Keep same levemir 12 units and same sliding scale.    SUBJECTIVE:   No complains. No tingling or numbness, no chest pain, no SOB    MEDICATIONS  (STANDING):  aspirin  chewable 81 milliGRAM(s) Oral daily  cinacalcet 30 milliGRAM(s) Oral daily  clopidogrel Tablet 75 milliGRAM(s) Oral daily  epoetin silvia Injectable 28909 Unit(s) IV Push once  heparin  Injectable 5000 Unit(s) SubCutaneous every 8 hours  insulin detemir injectable (LEVEMIR) 12 Unit(s) SubCutaneous at bedtime  insulin lispro (HumaLOG) corrective regimen sliding scale   SubCutaneous Before meals and at bedtime  insulin lispro (HumaLOG) corrective regimen sliding scale   SubCutaneous <User Schedule>  midodrine 5 milliGRAM(s) Oral every 8 hours  multivitamin 1 Tablet(s) Oral daily  pantoprazole    Tablet 40 milliGRAM(s) Oral before breakfast  phenylephrine    Infusion 0.106 MICROgram(s)/kG/Min (3 mL/Hr) IV Continuous <Continuous>  pregabalin 75 milliGRAM(s) Oral daily  sevelamer carbonate 1600 milliGRAM(s) Oral three times a day with meals  sodium chloride 0.9%. 1000 milliLiter(s) (20 mL/Hr) IV Continuous <Continuous>    MEDICATIONS  (PRN):  acetaminophen   Tablet .. 650 milliGRAM(s) Oral every 6 hours PRN Moderate Pain (4 - 6)  HYDROmorphone  Injectable 0.5 milliGRAM(s) IV Push every 4 hours PRN Severe Pain (7 - 10)    ICU Vital Signs Last 24 Hrs  T(C): 36.4 (01 Jun 2019 00:37), Max: 36.4 (01 Jun 2019 00:37)  T(F): 97.6 (01 Jun 2019 00:37), Max: 97.6 (01 Jun 2019 00:37)  HR: 72 (01 Jun 2019 08:00) (62 - 78)  BP: 101/47 (01 Jun 2019 08:00) (90/43 - 129/51)  BP(mean): 66 (01 Jun 2019 08:00) (54 - 100)  ABP: 104/34 (01 Jun 2019 08:00) (92/32 - 142/46)  ABP(mean): 56 (01 Jun 2019 08:00) (48 - 80)  RR: 20 (01 Jun 2019 08:00) (10 - 34)  SpO2: 100% (01 Jun 2019 08:00) (94% - 100%)      Physical Exam:  General: NAD  HEENT: NC/AT, EOMI, PERRLA, normal hearing, no oral lesions, neck supple w/o LAD  Pulmonary: Nonlabored breathing, no respiratory distress, CTA-B  Cardiovascular: NSR, no murmurs  Abdominal: soft, NT/ND, +BS, no organomegaly  Extremities: WWP, 5/5 strength x 4, no clubbing/cyanosis/edema  LUE : +thrill, kerlix C/D/I  Neuro: A/O x3, CNs II-XII grossly intact, normal motor/sensation, no focal deficits      I&O's Summary    31 May 2019 07:01  -  01 Jun 2019 07:00  --------------------------------------------------------  IN: 972.2 mL / OUT: 1 mL / NET: 971.2 mL    01 Jun 2019 07:01  -  01 Jun 2019 10:09  --------------------------------------------------------  IN: 34.2 mL / OUT: 0 mL / NET: 34.2 mL        LABS:                        8.6    7.20  )-----------( 99       ( 01 Jun 2019 06:22 )             27.9     06-01    133<L>  |  98  |  66<H>  ----------------------------<  215<H>  4.6   |  17<L>  |  7.04<H>    Ca    7.3<L>      01 Jun 2019 06:22  Phos  4.3     06-01  Mg     2.0     06-01          CAPILLARY BLOOD GLUCOSE      POCT Blood Glucose.: 208 mg/dL (01 Jun 2019 06:13)  POCT Blood Glucose.: 248 mg/dL (31 May 2019 22:46)  POCT Blood Glucose.: 216 mg/dL (31 May 2019 21:41)  POCT Blood Glucose.: 231 mg/dL (31 May 2019 20:58)  POCT Blood Glucose.: 247 mg/dL (31 May 2019 16:52)  POCT Blood Glucose.: 193 mg/dL (31 May 2019 14:28)  POCT Blood Glucose.: 165 mg/dL (31 May 2019 14:17)  POCT Blood Glucose.: 115 mg/dL (31 May 2019 12:56)  POCT Blood Glucose.: 100 mg/dL (31 May 2019 12:28)  POCT Blood Glucose.: 120 mg/dL (31 May 2019 10:51)

## 2019-06-01 NOTE — PROGRESS NOTE ADULT - PROBLEM SELECTOR PLAN 1
hemodialysis for 4 hours today   clearance only with minimal UF given hypotension ( still on minimal dose of phenylephrine)

## 2019-06-01 NOTE — PROGRESS NOTE ADULT - ASSESSMENT
Ms. Del Angel is a 77 yo F with thrombosed LUE AVF s/p open thrombectomy and revision admitted to SICU for hypotension (on pressor support).     Neuro: tylenol PRN, zofran PRN  CV: ASA, Plavix. MAP>65 on phenylephrine. (BP meds being held: metoprolol, nifedipine), started on midodrine   Pulm: Satting well on NC   GI: renal regular diet  : HD MWF will get HD today  ID:  Vanc (5/31 - 9 AM)  Endo: ISS, detemir  PPx: SQH.   Lines: PIV, LIJ permacath, A-line  Wounds: LUE brachiocephalic AVG Ms. Del Angel is a 77 yo F with ESRD on HD with thrombosed LUE AVF s/p open thrombectomy and revision admitted to SICU for hypotension (on pressor support).     Neuro: tylenol PRN, zofran PRN  CV: ASA, Plavix. MAP>65 on phenylephrine. (BP meds being held: metoprolol, nifedipine), started on midodrine   Pulm: Satting well on NC   GI: renal regular diet  : HD MWF will get HD today  ID:  Vanc (5/31 - 9 AM)  Endo: ISS, detemir  PPx: SQH.   Lines: PIV, LIJ permacath, A-line  Wounds: LUE brachiocephalic AVG

## 2019-06-02 DIAGNOSIS — I95.9 HYPOTENSION, UNSPECIFIED: ICD-10-CM

## 2019-06-02 LAB
ANION GAP SERPL CALC-SCNC: 12 MMOL/L — SIGNIFICANT CHANGE UP (ref 5–17)
BLD GP AB SCN SERPL QL: NEGATIVE — SIGNIFICANT CHANGE UP
BUN SERPL-MCNC: 37 MG/DL — HIGH (ref 7–23)
CALCIUM SERPL-MCNC: 6.9 MG/DL — LOW (ref 8.4–10.5)
CHLORIDE SERPL-SCNC: 98 MMOL/L — SIGNIFICANT CHANGE UP (ref 96–108)
CO2 SERPL-SCNC: 23 MMOL/L — SIGNIFICANT CHANGE UP (ref 22–31)
CREAT SERPL-MCNC: 4.9 MG/DL — HIGH (ref 0.5–1.3)
FRUCTOSAMINE SERPL-MCNC: 380 UMOL/L — HIGH (ref 205–285)
GLUCOSE BLDC GLUCOMTR-MCNC: 189 MG/DL — HIGH (ref 70–99)
GLUCOSE BLDC GLUCOMTR-MCNC: 210 MG/DL — HIGH (ref 70–99)
GLUCOSE BLDC GLUCOMTR-MCNC: 226 MG/DL — HIGH (ref 70–99)
GLUCOSE BLDC GLUCOMTR-MCNC: 237 MG/DL — HIGH (ref 70–99)
GLUCOSE BLDC GLUCOMTR-MCNC: 92 MG/DL — SIGNIFICANT CHANGE UP (ref 70–99)
GLUCOSE BLDC GLUCOMTR-MCNC: 98 MG/DL — SIGNIFICANT CHANGE UP (ref 70–99)
GLUCOSE SERPL-MCNC: 219 MG/DL — HIGH (ref 70–99)
HBV CORE IGM SER-ACNC: SIGNIFICANT CHANGE UP
HBV SURFACE AB SER-ACNC: SIGNIFICANT CHANGE UP
HCT VFR BLD CALC: 23.5 % — LOW (ref 34.5–45)
HCT VFR BLD CALC: 26.5 % — LOW (ref 34.5–45)
HGB BLD-MCNC: 7.2 G/DL — LOW (ref 11.5–15.5)
HGB BLD-MCNC: 8 G/DL — LOW (ref 11.5–15.5)
MAGNESIUM SERPL-MCNC: 1.7 MG/DL — SIGNIFICANT CHANGE UP (ref 1.6–2.6)
MCHC RBC-ENTMCNC: 30.2 GM/DL — LOW (ref 32–36)
MCHC RBC-ENTMCNC: 30.4 PG — SIGNIFICANT CHANGE UP (ref 27–34)
MCHC RBC-ENTMCNC: 30.6 GM/DL — LOW (ref 32–36)
MCHC RBC-ENTMCNC: 31 PG — SIGNIFICANT CHANGE UP (ref 27–34)
MCV RBC AUTO: 100.8 FL — HIGH (ref 80–100)
MCV RBC AUTO: 101.3 FL — HIGH (ref 80–100)
NRBC # BLD: 0 /100 WBCS — SIGNIFICANT CHANGE UP (ref 0–0)
NRBC # BLD: 0 /100 WBCS — SIGNIFICANT CHANGE UP (ref 0–0)
PHOSPHATE SERPL-MCNC: 2.2 MG/DL — LOW (ref 2.5–4.5)
PLATELET # BLD AUTO: 72 K/UL — LOW (ref 150–400)
PLATELET # BLD AUTO: 90 K/UL — LOW (ref 150–400)
POTASSIUM SERPL-MCNC: 3.8 MMOL/L — SIGNIFICANT CHANGE UP (ref 3.5–5.3)
POTASSIUM SERPL-SCNC: 3.8 MMOL/L — SIGNIFICANT CHANGE UP (ref 3.5–5.3)
RBC # BLD: 2.32 M/UL — LOW (ref 3.8–5.2)
RBC # BLD: 2.63 M/UL — LOW (ref 3.8–5.2)
RBC # FLD: 14.6 % — HIGH (ref 10.3–14.5)
RBC # FLD: 14.6 % — HIGH (ref 10.3–14.5)
RH IG SCN BLD-IMP: POSITIVE — SIGNIFICANT CHANGE UP
SODIUM SERPL-SCNC: 133 MMOL/L — LOW (ref 135–145)
WBC # BLD: 3.76 K/UL — LOW (ref 3.8–10.5)
WBC # BLD: 4.9 K/UL — SIGNIFICANT CHANGE UP (ref 3.8–10.5)
WBC # FLD AUTO: 3.76 K/UL — LOW (ref 3.8–10.5)
WBC # FLD AUTO: 4.9 K/UL — SIGNIFICANT CHANGE UP (ref 3.8–10.5)

## 2019-06-02 PROCEDURE — 71045 X-RAY EXAM CHEST 1 VIEW: CPT | Mod: 26

## 2019-06-02 PROCEDURE — 99233 SBSQ HOSP IP/OBS HIGH 50: CPT | Mod: GC

## 2019-06-02 RX ORDER — DOCUSATE SODIUM 100 MG
100 CAPSULE ORAL
Refills: 0 | Status: DISCONTINUED | OUTPATIENT
Start: 2019-06-02 | End: 2019-06-05

## 2019-06-02 RX ORDER — INSULIN DETEMIR 100/ML (3)
14 INSULIN PEN (ML) SUBCUTANEOUS AT BEDTIME
Refills: 0 | Status: DISCONTINUED | OUTPATIENT
Start: 2019-06-02 | End: 2019-06-03

## 2019-06-02 RX ORDER — MIDODRINE HYDROCHLORIDE 2.5 MG/1
10 TABLET ORAL EVERY 8 HOURS
Refills: 0 | Status: DISCONTINUED | OUTPATIENT
Start: 2019-06-02 | End: 2019-06-03

## 2019-06-02 RX ORDER — MIDODRINE HYDROCHLORIDE 2.5 MG/1
5 TABLET ORAL ONCE
Refills: 0 | Status: COMPLETED | OUTPATIENT
Start: 2019-06-02 | End: 2019-06-02

## 2019-06-02 RX ADMIN — SEVELAMER CARBONATE 1600 MILLIGRAM(S): 2400 POWDER, FOR SUSPENSION ORAL at 12:51

## 2019-06-02 RX ADMIN — Medication 14 UNIT(S): at 22:11

## 2019-06-02 RX ADMIN — Medication 1 TABLET(S): at 12:51

## 2019-06-02 RX ADMIN — MIDODRINE HYDROCHLORIDE 5 MILLIGRAM(S): 2.5 TABLET ORAL at 06:14

## 2019-06-02 RX ADMIN — SEVELAMER CARBONATE 1600 MILLIGRAM(S): 2400 POWDER, FOR SUSPENSION ORAL at 07:11

## 2019-06-02 RX ADMIN — Medication 4 UNIT(S): at 06:15

## 2019-06-02 RX ADMIN — Medication 2: at 02:35

## 2019-06-02 RX ADMIN — HEPARIN SODIUM 5000 UNIT(S): 5000 INJECTION INTRAVENOUS; SUBCUTANEOUS at 06:15

## 2019-06-02 RX ADMIN — Medication 2: at 16:53

## 2019-06-02 RX ADMIN — HEPARIN SODIUM 5000 UNIT(S): 5000 INJECTION INTRAVENOUS; SUBCUTANEOUS at 14:50

## 2019-06-02 RX ADMIN — CLOPIDOGREL BISULFATE 75 MILLIGRAM(S): 75 TABLET, FILM COATED ORAL at 12:51

## 2019-06-02 RX ADMIN — Medication 2: at 22:09

## 2019-06-02 RX ADMIN — CINACALCET 30 MILLIGRAM(S): 30 TABLET, FILM COATED ORAL at 12:51

## 2019-06-02 RX ADMIN — SEVELAMER CARBONATE 1600 MILLIGRAM(S): 2400 POWDER, FOR SUSPENSION ORAL at 16:58

## 2019-06-02 RX ADMIN — Medication 81 MILLIGRAM(S): at 12:51

## 2019-06-02 RX ADMIN — Medication 75 MILLIGRAM(S): at 12:51

## 2019-06-02 RX ADMIN — MIDODRINE HYDROCHLORIDE 10 MILLIGRAM(S): 2.5 TABLET ORAL at 14:51

## 2019-06-02 RX ADMIN — Medication 1: at 06:16

## 2019-06-02 RX ADMIN — HEPARIN SODIUM 5000 UNIT(S): 5000 INJECTION INTRAVENOUS; SUBCUTANEOUS at 22:09

## 2019-06-02 RX ADMIN — MIDODRINE HYDROCHLORIDE 5 MILLIGRAM(S): 2.5 TABLET ORAL at 09:12

## 2019-06-02 RX ADMIN — Medication 100 MILLIGRAM(S): at 16:53

## 2019-06-02 RX ADMIN — PANTOPRAZOLE SODIUM 40 MILLIGRAM(S): 20 TABLET, DELAYED RELEASE ORAL at 06:14

## 2019-06-02 RX ADMIN — Medication 4 UNIT(S): at 16:53

## 2019-06-02 NOTE — PROGRESS NOTE ADULT - SUBJECTIVE AND OBJECTIVE BOX
INTERVAL HPI/OVERNIGHT EVENTS:    Patient is a 78y old  Female who presents with a chief complaint of hyperglycemia, thrombosed left arm AV fistula (02 Jun 2019 09:58)  pt seen on 8east, planned for stepdown to 5uris  eating well today, about 50% tray  insulin administration reviewed  denies nausea, vomiting, abdominal pain, SOB, chest pain palpitations, urinary sx, headache, dizziness, subjective fever, chills    Pt reports the following symptoms:    CONSTITUTIONAL:  Negative fever or chills, feels well, good appetite  EYES:  Negative  blurry vision or double vision  CARDIOVASCULAR:  Negative for chest pain or palpitations  RESPIRATORY:  Negative for cough, wheezing, or SOB   GASTROINTESTINAL:  Negative for nausea, vomiting, diarrhea, constipation, or abdominal pain  GENITOURINARY:  Negative frequency, urgency or dysuria  NEUROLOGIC:  No headache, confusion, dizziness, lightheadedness    MEDICATIONS  (STANDING):  aspirin  chewable 81 milliGRAM(s) Oral daily  cinacalcet 30 milliGRAM(s) Oral daily  clopidogrel Tablet 75 milliGRAM(s) Oral daily  docusate sodium 100 milliGRAM(s) Oral two times a day  heparin  Injectable 5000 Unit(s) SubCutaneous every 8 hours  insulin detemir injectable (LEVEMIR) 12 Unit(s) SubCutaneous at bedtime  insulin lispro (HumaLOG) corrective regimen sliding scale   SubCutaneous Before meals and at bedtime  insulin lispro (HumaLOG) corrective regimen sliding scale   SubCutaneous <User Schedule>  insulin lispro Injectable (HumaLOG) 4 Unit(s) SubCutaneous three times a day before meals  midodrine 10 milliGRAM(s) Oral every 8 hours  Nephro-clint 1 Tablet(s) Oral daily  pantoprazole    Tablet 40 milliGRAM(s) Oral before breakfast  pregabalin 75 milliGRAM(s) Oral daily  sevelamer carbonate 1600 milliGRAM(s) Oral three times a day with meals    MEDICATIONS  (PRN):  acetaminophen   Tablet .. 650 milliGRAM(s) Oral every 6 hours PRN Moderate Pain (4 - 6)  HYDROmorphone  Injectable 0.5 milliGRAM(s) IV Push every 4 hours PRN Severe Pain (7 - 10)      Past medical history reviewed  Family history reviewed  Social history reviewed    PHYSICAL EXAM  Vital Signs Last 24 Hrs  T(C): 36.4 (02 Jun 2019 14:16), Max: 37.2 (01 Jun 2019 17:22)  T(F): 97.5 (02 Jun 2019 14:16), Max: 99 (01 Jun 2019 17:22)  HR: 84 (02 Jun 2019 15:00) (76 - 90)  BP: 106/48 (02 Jun 2019 15:00) (71/47 - 135/60)  BP(mean): 62 (02 Jun 2019 15:00) (55 - 90)  RR: 16 (02 Jun 2019 15:00) (8 - 23)  SpO2: 100% (02 Jun 2019 15:00) (93% - 100%)    Constitutional: wn/wd in NAD.   HEENT: NCAT, MMM, OP clear, EOMI, no proptosis or lid retraction  Neck: no thyromegaly or palpable thyroid nodules   Respiratory: lungs CTAB.  Cardiovascular: regular rhythm, normal S1 and S2, no audible murmurs, no peripheral edema  GI: soft, NT/ND, no masses/HSM appreciated.  Neurology: no tremors, DTR 2+  Skin: no visible rashes/lesions  Psychiatric: AAO x 3, normal affect/mood.    LABS:                        8.0    4.90  )-----------( 90       ( 02 Jun 2019 08:11 )             26.5     06-02    133<L>  |  98  |  37<H>  ----------------------------<  219<H>  3.8   |  23  |  4.90<H>    Ca    6.9<L>      02 Jun 2019 06:04  Phos  2.2     06-02  Mg     1.7     06-02              HbA1C: 8.7 % (06-01 @ 06:23)    CAPILLARY BLOOD GLUCOSE      POCT Blood Glucose.: 92 mg/dL (02 Jun 2019 12:39)  POCT Blood Glucose.: 98 mg/dL (02 Jun 2019 11:27)  POCT Blood Glucose.: 189 mg/dL (02 Jun 2019 05:53)  POCT Blood Glucose.: 237 mg/dL (02 Jun 2019 02:31)  POCT Blood Glucose.: 277 mg/dL (01 Jun 2019 23:01)  POCT Blood Glucose.: 199 mg/dL (01 Jun 2019 21:21)  POCT Blood Glucose.: 206 mg/dL (01 Jun 2019 17:26)

## 2019-06-02 NOTE — PROGRESS NOTE ADULT - ASSESSMENT
79 yo F with HTN, IDDM, CAD s/p stent, breast ca s/p mastectomy, and ESRD on HD  s/p open thrombectomy and revision admitted to SICU for hypotension requiring pressor support

## 2019-06-02 NOTE — PROGRESS NOTE ADULT - SUBJECTIVE AND OBJECTIVE BOX
CC: HYPERGLYCEMIA      INTERVAL HISTORY:  sitting up in chair  arm still sore but improved; feels better overall      ROS: No chest pain, no sob, no abd pain. No n/v/d    PAST MEDICAL & SURGICAL HISTORY:  HTN (hypertension)  Pagets Disease, Breast: left breast  CHF (Congestive Heart Failure): in the past  Breast Cancer: left breast  DM (Diabetes Mellitus): pt on insulin  ESRD on Dialysis: monday / wedensday/friday  CAD (Coronary Artery Disease): s/p drug eluting stent insertion in may 2010  Surgery, elective: mastectomy Left breast with lymph node removal  left AV graft revision  x 3 times , last one 2010  History of Cholecystectomy: in 1990      PHYSICAL EXAM:  T(C): 36.8 (06-02-19 @ 06:48), Max: 37.2 (06-01-19 @ 17:22)  HR: 76 (06-02-19 @ 07:00)  BP: 97/49 (06-02-19 @ 07:00) (91/46 - 135/74)  RR: 15 (06-02-19 @ 07:00)  SpO2: 100% (06-02-19 @ 07:00)  Wt(kg): --  I&O's Summary    01 Jun 2019 07:01  -  02 Jun 2019 07:00  --------------------------------------------------------  IN: 1203.7 mL / OUT: 1800 mL / NET: -596.3 mL    02 Jun 2019 07:01  -  02 Jun 2019 08:13  --------------------------------------------------------  IN: 20 mL / OUT: 0 mL / NET: 20 mL      Weight 75.5 (05-30 @ 18:10), 75.3 (05-30 @ 15:24)  General: AAO x 3,  NAD.  HEENT: moist mucous membranes, no pallor/cyanosis.  Neck: no JVD visible.  Cardiac: S1, S2. RRR. No murmurs   Respratory: CTA b/l, no access muscle use.   Abdomen: soft. nontender. nondistended  Skin: no rashes.  Extremities: no LE edema b/l  Access: + bruit in L AVG      DATA:                        7.2<L>  3.76<L> )-----------( 72<L>    ( 02 Jun 2019 06:04 )             23.5<L>        133<L>  |  98     |  37<H>  ----------------------------<  219<H>  Ca:6.9<L> (02 Jun 2019 06:04)  3.8     |  23     |  4.90<H>      eGFR if Non : 8 <L>  eGFR if : 9 <L>                        MEDICATIONS  (STANDING):  aspirin  chewable 81 milliGRAM(s) Oral daily  cinacalcet 30 milliGRAM(s) Oral daily  clopidogrel Tablet 75 milliGRAM(s) Oral daily  heparin  Injectable 5000 Unit(s) SubCutaneous every 8 hours  insulin detemir injectable (LEVEMIR) 12 Unit(s) SubCutaneous at bedtime  insulin lispro (HumaLOG) corrective regimen sliding scale   SubCutaneous Before meals and at bedtime  insulin lispro (HumaLOG) corrective regimen sliding scale   SubCutaneous <User Schedule>  insulin lispro Injectable (HumaLOG) 4 Unit(s) SubCutaneous three times a day before meals  midodrine 5 milliGRAM(s) Oral every 8 hours  Nephro-clint 1 Tablet(s) Oral daily  pantoprazole    Tablet 40 milliGRAM(s) Oral before breakfast  pregabalin 75 milliGRAM(s) Oral daily  sevelamer carbonate 1600 milliGRAM(s) Oral three times a day with meals    MEDICATIONS  (PRN):  acetaminophen   Tablet .. 650 milliGRAM(s) Oral every 6 hours PRN Moderate Pain (4 - 6)  HYDROmorphone  Injectable 0.5 milliGRAM(s) IV Push every 4 hours PRN Severe Pain (7 - 10)

## 2019-06-02 NOTE — PROGRESS NOTE ADULT - SUBJECTIVE AND OBJECTIVE BOX
Interval Events:  Patient seen and examined at bedside.      Allergies    penicillins (Swelling)    Intolerances        Vital Signs Last 24 Hrs  T(C): 36.8 (02 Jun 2019 06:48), Max: 37.2 (01 Jun 2019 17:22)  T(F): 98.3 (02 Jun 2019 06:48), Max: 99 (01 Jun 2019 17:22)  HR: 76 (02 Jun 2019 07:00) (68 - 90)  BP: 97/49 (02 Jun 2019 07:00) (91/46 - 135/74)  BP(mean): 64 (02 Jun 2019 07:00) (58 - 90)  RR: 15 (02 Jun 2019 07:00) (8 - 22)  SpO2: 100% (02 Jun 2019 07:00) (93% - 100%)    06-01 @ 07:01  -  06-02 @ 07:00  --------------------------------------------------------  IN: 1203.7 mL / OUT: 1800 mL / NET: -596.3 mL    06-02 @ 07:01  -  06-02 @ 08:16  --------------------------------------------------------  IN: 20 mL / OUT: 0 mL / NET: 20 mL      06-01 @ 07:01  -  06-02 @ 07:00  --------------------------------------------------------  IN: 1203.7 mL / OUT: 1800 mL / NET: -596.3 mL    06-02 @ 07:01  -  06-02 @ 08:16  --------------------------------------------------------  IN: 20 mL / OUT: 0 mL / NET: 20 mL        Physical Exam:     Gen: NAD well nourished  Neuro: A&OX3 No deficits  CV:RRR Reg s1s2 noM  Pulm: CTA b/l No w/r/r  Abd: Soft NT ND + BS  Ext: No C/C/E   Vasc: + DP b/l   Skin: no rashes noted  MSK: No joint swelling  Psych: No signs of anxiety or depression      LABS:  ABG - ( 31 May 2019 14:52 )  pH, Arterial: 7.30  pH, Blood: x     /  pCO2: 44    /  pO2: 98    / HCO3: 21    / Base Excess: -5.2  /  SaO2: 97                  CBC Full  -  ( 02 Jun 2019 06:04 )  WBC Count : 3.76 K/uL  RBC Count : 2.32 M/uL  Hemoglobin : 7.2 g/dL  Hematocrit : 23.5 %  Platelet Count - Automated : 72 K/uL  Mean Cell Volume : 101.3 fl  Mean Cell Hemoglobin : 31.0 pg  Mean Cell Hemoglobin Concentration : 30.6 gm/dL  Auto Neutrophil # : x  Auto Lymphocyte # : x  Auto Monocyte # : x  Auto Eosinophil # : x  Auto Basophil # : x  Auto Neutrophil % : x  Auto Lymphocyte % : x  Auto Monocyte % : x  Auto Eosinophil % : x  Auto Basophil % : x    06-02    133<L>  |  98  |  37<H>  ----------------------------<  219<H>  3.8   |  23  |  4.90<H>    Ca    6.9<L>      02 Jun 2019 06:04  Phos  2.2     06-02  Mg     1.7     06-02                      RADIOLOGY & ADDITIONAL STUDIES (The following images were personally reviewed):          A/p: 78yFemale Interval Events:  Off of phenylephrine since 9pm last night. BP in 90's this am but pt asymptomatic.   Patient seen and examined at bedside.      Allergies    penicillins (Swelling)    Intolerances        Vital Signs Last 24 Hrs  T(C): 36.8 (02 Jun 2019 06:48), Max: 37.2 (01 Jun 2019 17:22)  T(F): 98.3 (02 Jun 2019 06:48), Max: 99 (01 Jun 2019 17:22)  HR: 76 (02 Jun 2019 07:00) (68 - 90)  BP: 97/49 (02 Jun 2019 07:00) (91/46 - 135/74)  BP(mean): 64 (02 Jun 2019 07:00) (58 - 90)  RR: 15 (02 Jun 2019 07:00) (8 - 22)  SpO2: 100% (02 Jun 2019 07:00) (93% - 100%)    06-01 @ 07:01  -  06-02 @ 07:00  --------------------------------------------------------  IN: 1203.7 mL / OUT: 1800 mL / NET: -596.3 mL    06-02 @ 07:01  -  06-02 @ 08:16  --------------------------------------------------------  IN: 20 mL / OUT: 0 mL / NET: 20 mL      06-01 @ 07:01  -  06-02 @ 07:00  --------------------------------------------------------  IN: 1203.7 mL / OUT: 1800 mL / NET: -596.3 mL    06-02 @ 07:01  -  06-02 @ 08:16  --------------------------------------------------------  IN: 20 mL / OUT: 0 mL / NET: 20 mL        Physical Exam:     General: NAD  HEENT: NC/AT, EOMI, PERRLA, normal hearing, no oral lesions, neck supple w/o LAD  Pulmonary: Nonlabored breathing, no respiratory distress, CTA-B  Cardiovascular: NSR, no murmurs  Abdominal: soft, NT/ND, +BS, no organomegaly  Extremities: WWP, 5/5 strength x 4, no clubbing/cyanosis/edema  LUE : +bruit at superior medial aspect of AC C/D/I  Neuro: A/O x3, CNs II-XII grossly intact, normal motor/sensation, no focal deficits        LABS:  ABG - ( 31 May 2019 14:52 )  pH, Arterial: 7.30  pH, Blood: x     /  pCO2: 44    /  pO2: 98    / HCO3: 21    / Base Excess: -5.2  /  SaO2: 97                  CBC Full  -  ( 02 Jun 2019 06:04 )  WBC Count : 3.76 K/uL  RBC Count : 2.32 M/uL  Hemoglobin : 7.2 g/dL  Hematocrit : 23.5 %  Platelet Count - Automated : 72 K/uL  Mean Cell Volume : 101.3 fl  Mean Cell Hemoglobin : 31.0 pg  Mean Cell Hemoglobin Concentration : 30.6 gm/dL  Auto Neutrophil # : x  Auto Lymphocyte # : x  Auto Monocyte # : x  Auto Eosinophil # : x  Auto Basophil # : x  Auto Neutrophil % : x  Auto Lymphocyte % : x  Auto Monocyte % : x  Auto Eosinophil % : x  Auto Basophil % : x    06-02    133<L>  |  98  |  37<H>  ----------------------------<  219<H>  3.8   |  23  |  4.90<H>    Ca    6.9<L>      02 Jun 2019 06:04  Phos  2.2     06-02  Mg     1.7     06-02                      RADIOLOGY & ADDITIONAL STUDIES (The following images were personally reviewed):          A/p Ms. Del nAgel is a 79 yo F with thrombosed LUE AVF s/p open thrombectomy and revision admitted to SICU for hypotension (on pressor support).     Neuro: tylenol PRN, zofran PRN  CV: ASA, Plavix. phenylephrine gtts off. midodrine 5q8h- increased to 10 q8, (BP meds being held: metoprolol, nifedipine)  Pulm: Satting well on NC   GI: renal diabetic regular diet  : HD MWF (last on Sat, 6/1)  ID: post-op ppx: Vanc x 2 doses. (5/31 -6/1)  Endo: ISS, levemir and 2 units nutritional lispro tid. FS still high - will f/u with Endocrine regarding adjusting meds  PPx: SQH. SCD   Lines: PIV, LIJ permacath, A-line (5/31-6/2)   Wounds: LUE brachiocephalic AVG Interval Events:  Off of phenylephrine since 9pm last night. BP in 90's this am but pt asymptomatic, denying dizziness or fatigue   Patient seen and examined at bedside.      Allergies    penicillins (Swelling)    Intolerances        Vital Signs Last 24 Hrs  T(C): 36.8 (02 Jun 2019 06:48), Max: 37.2 (01 Jun 2019 17:22)  T(F): 98.3 (02 Jun 2019 06:48), Max: 99 (01 Jun 2019 17:22)  HR: 76 (02 Jun 2019 07:00) (68 - 90)  BP: 97/49 (02 Jun 2019 07:00) (91/46 - 135/74)  BP(mean): 64 (02 Jun 2019 07:00) (58 - 90)  RR: 15 (02 Jun 2019 07:00) (8 - 22)  SpO2: 100% (02 Jun 2019 07:00) (93% - 100%)    06-01 @ 07:01  -  06-02 @ 07:00  --------------------------------------------------------  IN: 1203.7 mL / OUT: 1800 mL / NET: -596.3 mL    06-02 @ 07:01  -  06-02 @ 08:16  --------------------------------------------------------  IN: 20 mL / OUT: 0 mL / NET: 20 mL      06-01 @ 07:01  -  06-02 @ 07:00  --------------------------------------------------------  IN: 1203.7 mL / OUT: 1800 mL / NET: -596.3 mL    06-02 @ 07:01  -  06-02 @ 08:16  --------------------------------------------------------  IN: 20 mL / OUT: 0 mL / NET: 20 mL        Physical Exam:     General: NAD  HEENT: NC/AT, EOMI, PERRLA, neck supple w/o LAD  Pulmonary: Nonlabored breathing, no respiratory distress, CTA-B  Cardiovascular: Normal s1s2 + Radiating fistula murmur heard on auscultation to left chest.  Abdominal: soft, NT/ND, +BS,   Extremities: WWP, 5/5 strength x 4, no clubbing/cyanosis/edema  LUE : +bruit at superior medial aspect of AC C/D/I  Neuro: A/O x3, CNs II-XII grossly intact, normal motor/sensation, no focal deficits        LABS:  ABG - ( 31 May 2019 14:52 )  pH, Arterial: 7.30  pH, Blood: x     /  pCO2: 44    /  pO2: 98    / HCO3: 21    / Base Excess: -5.2  /  SaO2: 97                  CBC Full  -  ( 02 Jun 2019 06:04 )  WBC Count : 3.76 K/uL  RBC Count : 2.32 M/uL  Hemoglobin : 7.2 g/dL  Hematocrit : 23.5 %  Platelet Count - Automated : 72 K/uL  Mean Cell Volume : 101.3 fl  Mean Cell Hemoglobin : 31.0 pg  Mean Cell Hemoglobin Concentration : 30.6 gm/dL  Auto Neutrophil # : x  Auto Lymphocyte # : x  Auto Monocyte # : x  Auto Eosinophil # : x  Auto Basophil # : x  Auto Neutrophil % : x  Auto Lymphocyte % : x  Auto Monocyte % : x  Auto Eosinophil % : x  Auto Basophil % : x    06-02    133<L>  |  98  |  37<H>  ----------------------------<  219<H>  3.8   |  23  |  4.90<H>    Ca    6.9<L>      02 Jun 2019 06:04  Phos  2.2     06-02  Mg     1.7     06-02                      RADIOLOGY & ADDITIONAL STUDIES (The following images were personally reviewed):          A/p Ms. Del Angel is a 77 yo F with thrombosed LUE AVF s/p open thrombectomy and revision admitted to SICU for hypotension (on pressor support).     Neuro: tylenol PRN, zofran PRN  CV: ASA, Plavix. phenylephrine gtts off. midodrine 5q8h- increased to 10 q8, (BP meds being held: metoprolol, nifedipine)  Pulm: Satting well on NC   GI: renal diabeticdiet  : HD MWF (last on Sat, 6/1)  ID: post-op ppx: Vanc x 2 doses. (5/31 -6/1)  Endo: ISS, levemir and 2 units nutritional lispro tid. FS still high - will f/u with Endocrine regarding adjusting meds  PPx: SQH. SCD   Lines: PIV, LIJ permacath, A-line (5/31-6/2)   Wounds: LUE brachiocephalic AVG

## 2019-06-02 NOTE — PROGRESS NOTE ADULT - PROBLEM SELECTOR PLAN 3
IV Phenylephrine discontinued last night at 9PM  BP well maintained - patient hemodynamically stable

## 2019-06-02 NOTE — PROGRESS NOTE ADULT - PROBLEM SELECTOR PLAN 2
partial graft resection with interposition graft, angioplasty of left subclavian/axillary veins, and stent of left axillary vein.

## 2019-06-02 NOTE — PROGRESS NOTE ADULT - SUBJECTIVE AND OBJECTIVE BOX
alert, up in chair, no c/o  vs-wnl  Left arm: inc-healing well, no signs of infection, mild swelling, palpable radial pulse and thrill over avg    BG-good control  off alpha agents    A/P doing well, plan for dc tomorrow, fu in office in 1 week [phone]

## 2019-06-03 ENCOUNTER — TRANSCRIPTION ENCOUNTER (OUTPATIENT)
Age: 79
End: 2019-06-03

## 2019-06-03 LAB
ANION GAP SERPL CALC-SCNC: 13 MMOL/L — SIGNIFICANT CHANGE UP (ref 5–17)
BUN SERPL-MCNC: 52 MG/DL — HIGH (ref 7–23)
CALCIUM SERPL-MCNC: 7.4 MG/DL — LOW (ref 8.4–10.5)
CHLORIDE SERPL-SCNC: 97 MMOL/L — SIGNIFICANT CHANGE UP (ref 96–108)
CHOLEST SERPL-MCNC: 129 MG/DL — SIGNIFICANT CHANGE UP (ref 10–199)
CO2 SERPL-SCNC: 24 MMOL/L — SIGNIFICANT CHANGE UP (ref 22–31)
CREAT SERPL-MCNC: 6.71 MG/DL — HIGH (ref 0.5–1.3)
GLUCOSE BLDC GLUCOMTR-MCNC: 141 MG/DL — HIGH (ref 70–99)
GLUCOSE BLDC GLUCOMTR-MCNC: 183 MG/DL — HIGH (ref 70–99)
GLUCOSE BLDC GLUCOMTR-MCNC: 221 MG/DL — HIGH (ref 70–99)
GLUCOSE BLDC GLUCOMTR-MCNC: 89 MG/DL — SIGNIFICANT CHANGE UP (ref 70–99)
GLUCOSE SERPL-MCNC: 140 MG/DL — HIGH (ref 70–99)
HCT VFR BLD CALC: 24.1 % — LOW (ref 34.5–45)
HDLC SERPL-MCNC: 44 MG/DL — LOW
HGB BLD-MCNC: 7.4 G/DL — LOW (ref 11.5–15.5)
LIPID PNL WITH DIRECT LDL SERPL: 56 MG/DL — SIGNIFICANT CHANGE UP
MAGNESIUM SERPL-MCNC: 1.9 MG/DL — SIGNIFICANT CHANGE UP (ref 1.6–2.6)
MCHC RBC-ENTMCNC: 30.7 GM/DL — LOW (ref 32–36)
MCHC RBC-ENTMCNC: 31 PG — SIGNIFICANT CHANGE UP (ref 27–34)
MCV RBC AUTO: 100.8 FL — HIGH (ref 80–100)
NRBC # BLD: 0 /100 WBCS — SIGNIFICANT CHANGE UP (ref 0–0)
PHOSPHATE SERPL-MCNC: 2.8 MG/DL — SIGNIFICANT CHANGE UP (ref 2.5–4.5)
PLATELET # BLD AUTO: 102 K/UL — LOW (ref 150–400)
POTASSIUM SERPL-MCNC: 4.1 MMOL/L — SIGNIFICANT CHANGE UP (ref 3.5–5.3)
POTASSIUM SERPL-SCNC: 4.1 MMOL/L — SIGNIFICANT CHANGE UP (ref 3.5–5.3)
RBC # BLD: 2.39 M/UL — LOW (ref 3.8–5.2)
RBC # FLD: 14.6 % — HIGH (ref 10.3–14.5)
SODIUM SERPL-SCNC: 134 MMOL/L — LOW (ref 135–145)
TOTAL CHOLESTEROL/HDL RATIO MEASUREMENT: 2.9 RATIO — LOW (ref 3.3–7.1)
TRIGL SERPL-MCNC: 147 MG/DL — SIGNIFICANT CHANGE UP (ref 10–149)
WBC # BLD: 4.41 K/UL — SIGNIFICANT CHANGE UP (ref 3.8–10.5)
WBC # FLD AUTO: 4.41 K/UL — SIGNIFICANT CHANGE UP (ref 3.8–10.5)

## 2019-06-03 PROCEDURE — 99232 SBSQ HOSP IP/OBS MODERATE 35: CPT | Mod: GC

## 2019-06-03 RX ORDER — INSULIN LISPRO 100/ML
3 VIAL (ML) SUBCUTANEOUS
Refills: 0 | Status: DISCONTINUED | OUTPATIENT
Start: 2019-06-03 | End: 2019-06-05

## 2019-06-03 RX ORDER — INSULIN GLARGINE 100 [IU]/ML
10 INJECTION, SOLUTION SUBCUTANEOUS AT BEDTIME
Refills: 0 | Status: DISCONTINUED | OUTPATIENT
Start: 2019-06-03 | End: 2019-06-04

## 2019-06-03 RX ORDER — CLOPIDOGREL BISULFATE 75 MG/1
1 TABLET, FILM COATED ORAL
Qty: 0 | Refills: 0 | DISCHARGE
Start: 2019-06-03

## 2019-06-03 RX ORDER — SEVELAMER CARBONATE 2400 MG/1
2 POWDER, FOR SUSPENSION ORAL
Qty: 0 | Refills: 0 | DISCHARGE

## 2019-06-03 RX ORDER — ERYTHROPOIETIN 10000 [IU]/ML
10000 INJECTION, SOLUTION INTRAVENOUS; SUBCUTANEOUS ONCE
Refills: 0 | Status: COMPLETED | OUTPATIENT
Start: 2019-06-03 | End: 2019-06-03

## 2019-06-03 RX ORDER — ASPIRIN/CALCIUM CARB/MAGNESIUM 324 MG
1 TABLET ORAL
Qty: 0 | Refills: 0 | DISCHARGE
Start: 2019-06-03

## 2019-06-03 RX ORDER — INSULIN DETEMIR 100/ML (3)
10 INSULIN PEN (ML) SUBCUTANEOUS AT BEDTIME
Refills: 0 | Status: DISCONTINUED | OUTPATIENT
Start: 2019-06-03 | End: 2019-06-03

## 2019-06-03 RX ADMIN — PANTOPRAZOLE SODIUM 40 MILLIGRAM(S): 20 TABLET, DELAYED RELEASE ORAL at 06:19

## 2019-06-03 RX ADMIN — INSULIN GLARGINE 10 UNIT(S): 100 INJECTION, SOLUTION SUBCUTANEOUS at 23:59

## 2019-06-03 RX ADMIN — Medication 1: at 07:25

## 2019-06-03 RX ADMIN — CINACALCET 30 MILLIGRAM(S): 30 TABLET, FILM COATED ORAL at 15:25

## 2019-06-03 RX ADMIN — Medication 100 MILLIGRAM(S): at 06:19

## 2019-06-03 RX ADMIN — Medication 75 MILLIGRAM(S): at 15:25

## 2019-06-03 RX ADMIN — Medication 1 TABLET(S): at 15:25

## 2019-06-03 RX ADMIN — SEVELAMER CARBONATE 1600 MILLIGRAM(S): 2400 POWDER, FOR SUSPENSION ORAL at 07:25

## 2019-06-03 RX ADMIN — HEPARIN SODIUM 5000 UNIT(S): 5000 INJECTION INTRAVENOUS; SUBCUTANEOUS at 21:49

## 2019-06-03 RX ADMIN — ERYTHROPOIETIN 10000 UNIT(S): 10000 INJECTION, SOLUTION INTRAVENOUS; SUBCUTANEOUS at 11:19

## 2019-06-03 RX ADMIN — HEPARIN SODIUM 5000 UNIT(S): 5000 INJECTION INTRAVENOUS; SUBCUTANEOUS at 06:19

## 2019-06-03 RX ADMIN — Medication 81 MILLIGRAM(S): at 15:25

## 2019-06-03 RX ADMIN — Medication 4 UNIT(S): at 07:25

## 2019-06-03 RX ADMIN — HEPARIN SODIUM 5000 UNIT(S): 5000 INJECTION INTRAVENOUS; SUBCUTANEOUS at 15:25

## 2019-06-03 RX ADMIN — CLOPIDOGREL BISULFATE 75 MILLIGRAM(S): 75 TABLET, FILM COATED ORAL at 15:25

## 2019-06-03 NOTE — PROGRESS NOTE ADULT - SUBJECTIVE AND OBJECTIVE BOX
o/n: stopped midodrine  6/2: FS still high in am but then low later - As per Endo:levemir increased to 14.   BP low this am - increased midodrine to 10 q8. BP improved dc ton and up for transfer to [facility].      79 yo F w/ HTN, IDDM, CAD s/p stent, and ESRD on HD, s/p open thrombectomy of LUE AVG, partial graft resection with interposition graft, angioplasty of left subclavian/axillary veins, and stent of left axillary vein. Hemodynamically improving as pressors weaning down.     Plan:  - HD per renal  - f/u endocrine recs (levemir 12, ISS)  - home meds (holding BB and ARB)  - per Dr. Triplett, no more need for Eliquis for now, may reconsider in future; ASA/Plavix, SQH  - prn pain control  - one more vanc dose w/ HD  - renal/DM diet  - f/u am labs o/n: stopped midodrine  6/2: FS still high in am but then low later - As per Endo:levemir increased to 14.   BP low this am - increased midodrine to 10 q8. BP improved dc ton and up for transfer to [facility].     SUBJECTIVE: This morning, she feels well; her pain is well-controlled. No nausea or vomiting. No acute complaints.    aspirin  chewable 81 milliGRAM(s) Oral daily  clopidogrel Tablet 75 milliGRAM(s) Oral daily  heparin  Injectable 5000 Unit(s) SubCutaneous every 8 hours      Vital Signs Last 24 Hrs  T(C): 36.5 (03 Jun 2019 06:01), Max: 36.5 (03 Jun 2019 06:01)  T(F): 97.7 (03 Jun 2019 06:01), Max: 97.7 (03 Jun 2019 06:01)  HR: 78 (03 Jun 2019 05:20) (76 - 87)  BP: 132/64 (03 Jun 2019 05:20) (71/47 - 172/75)  BP(mean): 83 (02 Jun 2019 16:54) (55 - 83)  RR: 18 (03 Jun 2019 05:20) (14 - 23)  SpO2: 99% (03 Jun 2019 05:20) (99% - 100%)  I&O's Detail    02 Jun 2019 07:01  -  03 Jun 2019 07:00  --------------------------------------------------------  IN:    sodium chloride 0.9%: 20 mL  Total IN: 20 mL    OUT:  Total OUT: 0 mL    Total NET: 20 mL          General: NAD, resting comfortably in bed  Pulm: Nonlabored breathing, no respiratory distress  Extrem: WWP, no edema, incisions CDI with Dermabond in place, no kenna-incisional erythema or induration, palpable thrill in LUE AVF        LABS:                        8.0    4.90  )-----------( 90       ( 02 Jun 2019 08:11 )             26.5     06-02    133<L>  |  98  |  37<H>  ----------------------------<  219<H>  3.8   |  23  |  4.90<H>    Ca    6.9<L>      02 Jun 2019 06:04  Phos  2.2     06-02  Mg     1.7     06-02      78F PMHx HTN, DM2, admitted with thrombosed LUE AVF s/p fistulogram with angioplasty, resection of thrombosed AVG and new interposition graft (5/31); hospital course c/b pressor requirements and hyperglycemia, off pressors now.    pain/nausea control  aspirin, plavix  IS/OOB  renal diabetic diet  HD MWF  s/p vancomycin (5/31-6/1)  ISS, levemir 14 and 2 units nutritional lispro tid  SQH. SCD   PIV, LIJ permacath, A-line (5/31-6/2)  LUE brachiocephalic AVG

## 2019-06-03 NOTE — PROGRESS NOTE ADULT - SUBJECTIVE AND OBJECTIVE BOX
Patient was seen and evaluated on dialysis.   Patient is tolerating the procedure well.   HR: 81 (06-03-19 @ 10:50)  BP: 114/51 (06-03-19 @ 10:50) pusle 65, /67  Continue dialysis:   Dialyzer:  180       QB:    300    QD: 500  Goal UF 1500 ml over 3 Hours

## 2019-06-03 NOTE — PHYSICAL THERAPY INITIAL EVALUATION ADULT - PLANNED THERAPY INTERVENTIONS, PT EVAL
strengthening/postural re-education/balance training/bed mobility training/gait training/transfer training

## 2019-06-03 NOTE — PROGRESS NOTE ADULT - ASSESSMENT
This is 78 year old male with PMH stated before, now s/p revision of the AV fistula. The renal service follows the case for the need of HD - last HD done on 5/31       problems       ESRD - MWF - schedule   - last HD done on 5/31   - we will do HD today - regular diet   - volume status acceptable   - in and outs   - daily weight   - renal diet     Renal bone disease   _ PLEASE STOP THE SEVELAMER - the phosphorus on the low side   - please obtain PTH level   - calciumnoted - 7.4   - on cinacalcet 30 mg daily       Anemia   - hgb - 7.4   - transfusiona s per primary team   - please obtain - iron, ferritin, TIBC   - we will do EPO with HD     HTN   - well compensated   - no need for BP medications

## 2019-06-03 NOTE — PROGRESS NOTE ADULT - SUBJECTIVE AND OBJECTIVE BOX
INTERVAL HPI/OVERNIGHT EVENTS:    Patient is a 78y old  Female who presents with a chief complaint of hyperglycemia, thrombosed left arm AV fistula (2019 13:34)  Patient is feeling well. She is eating some but not all her meals.  FSG & Insulin received:    Yesterday:  pre-dinner fs  nutritional lispro 4  units + 2  units lispro SS  bedtime fs  lantus 14  units +  2  units lispro SS    Today:  pre-breakfast fs  nutritional lispro 4  units+   1 units lispro SS  pre-lunch fs  nutritional lispro 0  units+ 0  units lispro SS      Pt reports the following symptoms:    CONSTITUTIONAL:  Negative fever or chills, feels well, good appetite  EYES:  Negative  blurry vision or double vision  CARDIOVASCULAR:  Negative for chest pain or palpitations  RESPIRATORY:  Negative for cough, wheezing, or SOB   GASTROINTESTINAL:  Negative for nausea, vomiting, diarrhea, constipation, or abdominal pain  GENITOURINARY:  Negative frequency, urgency or dysuria  NEUROLOGIC:  No headache, confusion, dizziness, lightheadedness    MEDICATIONS  (STANDING):  aspirin  chewable 81 milliGRAM(s) Oral daily  cinacalcet 30 milliGRAM(s) Oral daily  clopidogrel Tablet 75 milliGRAM(s) Oral daily  docusate sodium 100 milliGRAM(s) Oral two times a day  heparin  Injectable 5000 Unit(s) SubCutaneous every 8 hours  insulin detemir injectable (LEVEMIR) 14 Unit(s) SubCutaneous at bedtime  insulin lispro (HumaLOG) corrective regimen sliding scale   SubCutaneous Before meals and at bedtime  insulin lispro (HumaLOG) corrective regimen sliding scale   SubCutaneous <User Schedule>  insulin lispro Injectable (HumaLOG) 4 Unit(s) SubCutaneous three times a day before meals  Nephro-clint 1 Tablet(s) Oral daily  pantoprazole    Tablet 40 milliGRAM(s) Oral before breakfast  pregabalin 75 milliGRAM(s) Oral daily    MEDICATIONS  (PRN):  acetaminophen   Tablet .. 650 milliGRAM(s) Oral every 6 hours PRN Moderate Pain (4 - 6)  HYDROmorphone  Injectable 0.5 milliGRAM(s) IV Push every 4 hours PRN Severe Pain (7 - 10)      PHYSICAL EXAM  Vital Signs Last 24 Hrs  T(C): 36.3 (2019 14:50), Max: 37.1 (2019 10:00)  T(F): 97.3 (2019 14:50), Max: 98.7 (2019 10:00)  HR: 100 (2019 15:28) (78 - 100)  BP: 142/63 (2019 15:28) (114/51 - 172/75)  BP(mean): --  RR: 17 (2019 15:28) (16 - 18)  SpO2: 97% (2019 15:28) (97% - 100%)    Constitutional: wn/wd in NAD.   HEENT: NCAT, MMM, OP clear, EOMI, no proptosis or lid retraction  Neck: no thyromegaly or palpable thyroid nodules   Respiratory: lungs CTAB.  Cardiovascular: regular rhythm, normal S1 and S2, no audible murmurs, no peripheral edema  GI: soft, NT/ND, no masses/HSM appreciated.  Neurology: no tremors, DTR 2+  Skin: no visible rashes/lesions  Psychiatric: AAO x 3, normal affect/mood.    LABS:                        7.4    4.41  )-----------( 102      ( 2019 11:38 )             24.1     06-03    134<L>  |  97  |  52<H>  ----------------------------<  140<H>  4.1   |  24  |  6.71<H>    Ca    7.4<L>      2019 11:38  Phos  2.8     06-03  Mg     1.9     06-03              HbA1C: 8.7 % ( @ 06:23)    CAPILLARY BLOOD GLUCOSE      POCT Blood Glucose.: 141 mg/dL (2019 16:31)  POCT Blood Glucose.: 89 mg/dL (2019 15:13)  POCT Blood Glucose.: 183 mg/dL (2019 06:38)  POCT Blood Glucose.: 226 mg/dL (2019 21:34)      Insulin Sliding Scale requirements X 24 Hours:    RADIOLOGY & ADDITIONAL TESTS:    A/P 78yFemale with hx of DM presenting for management of thrombosed AV fistula with improving glycemic control    1.  DM: type 2, uncontrolled  Cont levemir 14 units at bedtime, dec to 3 units lispro with meals.   Continue lispro moderate dose scale with meals and at bedtime.   Continue consistent carb diet  FSG Goal 100-180      Pt can follow up at discharge with [facility] by calling  to make an appointment.   Will discuss case with Dr. Hamilton   and update primary team

## 2019-06-03 NOTE — DISCHARGE NOTE PROVIDER - HOSPITAL COURSE
79 yo F with HTN, IDDM, CAD s/p stent, breast ca s/p mastectomy, and ESRD on HD (MWF) presented this morning to same day surgery for elective left arm AV fistula revision (open thrombectomy with possible angioplasty/stent). Pre-op, pt had received 24 hr protocol for reported contrast allergy, which was prednisone 50 mg at 6 am yesterday, 6 pm yesterday, and 6 am today. However, upon further clarification, she did not have an IV contrast allergy. On arrival to same day surgery, finger stick was 579. She received 12 U of lispro. Recheck was 571, for which she received another 10 U of lispro. Repeat was 524, so she was transferred to ER. There, endocrine recommended levemir 12 U tonight and lispro 4 U with dinner, as well as Q4H FS with Q4H sliding scale. She had no mental status changes, no blurry vision, no thirstiness, no abd pain, no syncope. On 5/31, she underwent her fistulogram with balloon angioplasty and stent placement into the axillary vein, resection of chronically thrombosed portion of AVG and new ringed PTFE graft as an interposition. Postoperatively, she required pressor support with phenylephrine and midodrine. Both were weaned off prior to discharge. Physical therapy saw her prior to discharge. She was discharged home on HD#4 with a new prescription for aspirin 81mg and plavix and recommendations to follow up with Dr. Triplett in 1 week. 77 yo F with HTN, IDDM, CAD s/p stent, breast ca s/p mastectomy, and ESRD on HD (MWF) presented this morning to same day surgery for elective left arm AV fistula revision (open thrombectomy with possible angioplasty/stent). Pre-op, pt had received 24 hr protocol for reported contrast allergy, which was prednisone 50 mg at 6 am yesterday, 6 pm yesterday, and 6 am today. However, upon further clarification, she did not have an IV contrast allergy. On arrival to same day surgery, finger stick was 579. She received 12 U of lispro. Recheck was 571, for which she received another 10 U of lispro. Repeat was 524, so she was transferred to ER. There, endocrine recommended levemir 12 U tonight and lispro 4 U with dinner, as well as Q4H FS with Q4H sliding scale. She had no mental status changes, no blurry vision, no thirstiness, no abd pain, no syncope. On 5/31, she underwent her fistulogram with balloon angioplasty and stent placement into the axillary vein, resection of chronically thrombosed portion of AVG and new ringed PTFE graft as an interposition. Postoperatively, she required pressor support with phenylephrine and midodrine. Both were weaned off prior to discharge. Physical therapy saw her prior to discharge recommended GEORGETTE but patient refused. She was discharged home with home PT on HD#6 with a new prescription for aspirin 81mg, plavix, levamir 14 and lispro 3TID and recommendations to follow up with Dr. Triplett in 1 week.

## 2019-06-03 NOTE — PHYSICAL THERAPY INITIAL EVALUATION ADULT - GENERAL OBSERVATIONS, REHAB EVAL
Patient encountered supine in bed, NAD, +CB, +telebox, +IV heplock, surgical site bandage C/D/I, team cleared patient to ambulate.

## 2019-06-03 NOTE — DISCHARGE NOTE PROVIDER - NSDCCPCAREPLAN_GEN_ALL_CORE_FT
PRINCIPAL DISCHARGE DIAGNOSIS  Diagnosis: Hyperglycemia  Assessment and Plan of Treatment:       SECONDARY DISCHARGE DIAGNOSES  Diagnosis: Thrombosis of arteriovenous fistula, subsequent encounter  Assessment and Plan of Treatment:

## 2019-06-03 NOTE — DISCHARGE NOTE PROVIDER - CARE PROVIDER_API CALL
Abdirizak Triplett)  Surgery; Vascular Surgery  [address] [place] [address]  Phone: [phone]  Fax: [phone]  Follow Up Time: 1 week

## 2019-06-03 NOTE — PROGRESS NOTE ADULT - SUBJECTIVE AND OBJECTIVE BOX
Seen in the morning, no shortness of breath, no chest pain, no fever     The renal follows the case for the need of HD     Patient seen and examined at bedside.     acetaminophen   Tablet .. 650 milliGRAM(s) every 6 hours PRN  aspirin  chewable 81 milliGRAM(s) daily  cinacalcet 30 milliGRAM(s) daily  clopidogrel Tablet 75 milliGRAM(s) daily  docusate sodium 100 milliGRAM(s) two times a day  heparin  Injectable 5000 Unit(s) every 8 hours  HYDROmorphone  Injectable 0.5 milliGRAM(s) every 4 hours PRN  insulin detemir injectable (LEVEMIR) 14 Unit(s) at bedtime  insulin lispro (HumaLOG) corrective regimen sliding scale   Before meals and at bedtime  insulin lispro (HumaLOG) corrective regimen sliding scale   <User Schedule>  insulin lispro Injectable (HumaLOG) 4 Unit(s) three times a day before meals  Nephro-clint 1 Tablet(s) daily  pantoprazole    Tablet 40 milliGRAM(s) before breakfast  pregabalin 75 milliGRAM(s) daily  sevelamer carbonate 1600 milliGRAM(s) three times a day with meals      Allergies    penicillins (Swelling)    Intolerances        T(C): , Max: 37.1 (06-03-19 @ 10:00)  T(F): , Max: 98.7 (06-03-19 @ 10:00)  HR: 81 (06-03-19 @ 10:50)  BP: 114/51 (06-03-19 @ 10:50)  BP(mean): 83 (06-02-19 @ 16:54)  RR: 16 (06-03-19 @ 10:50)  SpO2: 99% (06-03-19 @ 10:50)  Wt(kg): --    06-02 @ 07:01  -  06-03 @ 07:00  --------------------------------------------------------  IN:    sodium chloride 0.9%: 20 mL  Total IN: 20 mL    OUT:  Total OUT: 0 mL    Total NET: 20 mL      06-03 @ 07:01  -  06-03 @ 13:28  --------------------------------------------------------  IN:    Oral Fluid: 180 mL  Total IN: 180 mL    OUT:  Total OUT: 0 mL    Total NET: 180 mL    Physical exam:   Alert and oriented  No JVD   Normal s1/s2,RRR, no murmursm rubs or gallops   Abdomen - soft, not tender, not distended   Extremities: no edema   HAs a perm cath on the left of her chest   HAs a left AV fistula s/p revision           LABS:                        7.4    4.41  )-----------( 102      ( 03 Jun 2019 11:38 )             24.1     06-03    134<L>  |  97  |  52<H>  ----------------------------<  140<H>  4.1   |  24  |  6.71<H>    Ca    7.4<L>      03 Jun 2019 11:38  Phos  2.8     06-03  Mg     1.9     06-03                  RADIOLOGY & ADDITIONAL STUDIES:

## 2019-06-03 NOTE — PHYSICAL THERAPY INITIAL EVALUATION ADULT - ADDITIONAL COMMENTS
Patient reports living elevator building with 3 steps to enter. At baseline patient has been using a quad cane with occasional assist from her spouse for functional ADL's, however, she states "he's also sick and can't help much."

## 2019-06-03 NOTE — PHYSICAL THERAPY INITIAL EVALUATION ADULT - IMPAIRMENTS CONTRIBUTING TO GAIT DEVIATIONS, PT EVAL
decreased strength/decreased sensation/impaired postural control/6/10 incisional pain/impaired balance/pain

## 2019-06-03 NOTE — PHYSICAL THERAPY INITIAL EVALUATION ADULT - PERTINENT HX OF CURRENT PROBLEM, REHAB EVAL
Patient is a 79 y/o F presenting for Thrombectomy, AV graft, upper extremity secondary to thrombosed left arm AV fistula. Patient admitted to ER with hyperglycemia prior to procedure to 579. Patient now s/p vascular procedure.

## 2019-06-03 NOTE — PHYSICAL THERAPY INITIAL EVALUATION ADULT - CRITERIA FOR SKILLED THERAPEUTIC INTERVENTIONS
functional limitations in following categories/predicted duration of therapy intervention/anticipated equipment needs at discharge/anticipated discharge recommendation/impairments found/risk reduction/prevention/rehab potential/therapy frequency

## 2019-06-03 NOTE — PHYSICAL THERAPY INITIAL EVALUATION ADULT - IMPAIRMENTS FOUND, PT EVAL
ergonomics and body mechanics/gait, locomotion, and balance/aerobic capacity/endurance/posture/muscle strength

## 2019-06-03 NOTE — DISCHARGE NOTE PROVIDER - NSDCFUADDINST_GEN_ALL_CORE_FT
-Please follow up with Dr. Triplett next week. His office number is [phone].  -Please follow up with your endocrinologist (diabetes doctor). Your sugars were very high. We adjusted your insulin here. Please take 14U levemir at bedtime and 4U lispro with meals.   -We placed a stent in your graft. Please take aspirin 81mg daily and Plavix daily.   -Continue eating your regular RENAL diet as tolerated and drinking plenty of fluids.   -For pain, you may take over-the-counter Tylenol as labeled. Do NOT take Advil, Motrin, or NSAIDs.  -No heavy lifting >20 pounds or strenuous exercise.  -You may remove your bandages 48 hours after surgery. Please keep wounds clean and dry.   -You may shower but NO baths and NO swimming. Keep your incisions clean & dry. Avoid a direct stream of water over incision sites. Do not scrub. Pat dry when done. -Please follow up with Dr. Triplett next week. His office number is [phone].  -Please follow up with your endocrinologist (diabetes doctor). Your sugars were very high. We adjusted your insulin here. Please take 14U Levemir at bedtime and 4U Lispro with meals.   -We placed a stent in your graft. Please take aspirin 81mg daily and Plavix daily. Continue to take Eliquis.   -STOP taking the phosphate binder sevelamer.  -Continue eating your regular RENAL diet as tolerated and drinking plenty of fluids.   -For pain, you may take over-the-counter Tylenol as labeled. Do NOT take Advil, Motrin, or NSAIDs.  -No heavy lifting >20 pounds or strenuous exercise.  -You may remove your bandages 48 hours after surgery. Please keep wounds clean and dry.   -You may shower but NO baths and NO swimming. Keep your incisions clean & dry. Avoid a direct stream of water over incision sites. Do not scrub. Pat dry when done. -Please follow up with Dr. Triplett next week. His office number is [phone].  -Please follow up with your endocrinologist (diabetes doctor). Your sugars were very high. We adjusted your insulin here. Please take 14U Levemir at bedtime and 4U Lispro with meals.   -We placed a stent in your graft. Please take aspirin 81mg daily and Plavix daily. Continue to take Eliquis.   -STOP taking the phosphate binder sevelamer.  -Continue eating your regular RENAL diet as tolerated.   -For pain, you may take over-the-counter Tylenol as labeled. Do NOT take Advil, Motrin, or NSAIDs.  -No heavy lifting >20 pounds or strenuous exercise.  -Please keep wounds clean and dry.   -You may shower but NO baths and NO swimming. Keep your incisions clean & dry. Avoid a direct stream of water over incision sites. Do not scrub. Pat dry when done.

## 2019-06-04 LAB
GLUCOSE BLDC GLUCOMTR-MCNC: 133 MG/DL — HIGH (ref 70–99)
GLUCOSE BLDC GLUCOMTR-MCNC: 142 MG/DL — HIGH (ref 70–99)
GLUCOSE BLDC GLUCOMTR-MCNC: 219 MG/DL — HIGH (ref 70–99)
GLUCOSE BLDC GLUCOMTR-MCNC: 233 MG/DL — HIGH (ref 70–99)
GLUCOSE BLDC GLUCOMTR-MCNC: 234 MG/DL — HIGH (ref 70–99)

## 2019-06-04 PROCEDURE — 99232 SBSQ HOSP IP/OBS MODERATE 35: CPT | Mod: GC

## 2019-06-04 RX ORDER — INSULIN DETEMIR 100/ML (3)
14 INSULIN PEN (ML) SUBCUTANEOUS AT BEDTIME
Refills: 0 | Status: DISCONTINUED | OUTPATIENT
Start: 2019-06-04 | End: 2019-06-05

## 2019-06-04 RX ORDER — INSULIN LISPRO 100/ML
VIAL (ML) SUBCUTANEOUS AT BEDTIME
Refills: 0 | Status: DISCONTINUED | OUTPATIENT
Start: 2019-06-04 | End: 2019-06-05

## 2019-06-04 RX ORDER — ACETAMINOPHEN 500 MG
650 TABLET ORAL ONCE
Refills: 0 | Status: COMPLETED | OUTPATIENT
Start: 2019-06-04 | End: 2019-06-04

## 2019-06-04 RX ORDER — INSULIN LISPRO 100/ML
VIAL (ML) SUBCUTANEOUS
Refills: 0 | Status: DISCONTINUED | OUTPATIENT
Start: 2019-06-04 | End: 2019-06-05

## 2019-06-04 RX ADMIN — Medication 3 UNIT(S): at 16:38

## 2019-06-04 RX ADMIN — HEPARIN SODIUM 5000 UNIT(S): 5000 INJECTION INTRAVENOUS; SUBCUTANEOUS at 06:20

## 2019-06-04 RX ADMIN — Medication 81 MILLIGRAM(S): at 11:56

## 2019-06-04 RX ADMIN — CLOPIDOGREL BISULFATE 75 MILLIGRAM(S): 75 TABLET, FILM COATED ORAL at 11:56

## 2019-06-04 RX ADMIN — Medication 3 UNIT(S): at 11:55

## 2019-06-04 RX ADMIN — Medication 100 MILLIGRAM(S): at 06:20

## 2019-06-04 RX ADMIN — HEPARIN SODIUM 5000 UNIT(S): 5000 INJECTION INTRAVENOUS; SUBCUTANEOUS at 14:21

## 2019-06-04 RX ADMIN — Medication 100 MILLIGRAM(S): at 16:38

## 2019-06-04 RX ADMIN — Medication 3 UNIT(S): at 07:52

## 2019-06-04 RX ADMIN — Medication 75 MILLIGRAM(S): at 11:56

## 2019-06-04 RX ADMIN — Medication 14 UNIT(S): at 22:35

## 2019-06-04 RX ADMIN — Medication 650 MILLIGRAM(S): at 02:56

## 2019-06-04 RX ADMIN — HEPARIN SODIUM 5000 UNIT(S): 5000 INJECTION INTRAVENOUS; SUBCUTANEOUS at 22:25

## 2019-06-04 RX ADMIN — Medication 2: at 07:52

## 2019-06-04 RX ADMIN — PANTOPRAZOLE SODIUM 40 MILLIGRAM(S): 20 TABLET, DELAYED RELEASE ORAL at 06:20

## 2019-06-04 RX ADMIN — Medication 650 MILLIGRAM(S): at 04:00

## 2019-06-04 RX ADMIN — CINACALCET 30 MILLIGRAM(S): 30 TABLET, FILM COATED ORAL at 11:56

## 2019-06-04 RX ADMIN — Medication 1 TABLET(S): at 11:56

## 2019-06-04 NOTE — PROGRESS NOTE ADULT - SUBJECTIVE AND OBJECTIVE BOX
INTERVAL HPI/OVERNIGHT EVENTS:    Patient is a 78y old  Female who presents with a chief complaint of hyperglycemia, thrombosed left arm AV fistula (2019 13:34)  Patient is feeling well. She is eating some but not all her meals. Patient refused humalog last night because she was worried about dropping too much overnight. The team then decided to give lantus 10 instead.     FSG & Insulin received:    Yesterday:  pre-dinner fs,141  nutritional lispro 0  units + 0  units lispro SS  bedtime fs  lantus 10  units +  2  units lispro SS    Today:  pre-breakfast fs  nutritional lispro 3  units+   2 units lispro SS  pre-lunch fs  nutritional lispro 3  units+ 0  units lispro SS    Pt reports the following symptoms:    CONSTITUTIONAL:  Negative fever or chills, feels well, good appetite  EYES:  Negative  blurry vision or double vision  CARDIOVASCULAR:  Negative for chest pain or palpitations  RESPIRATORY:  Negative for cough, wheezing, or SOB   GASTROINTESTINAL:  Negative for nausea, vomiting, diarrhea, constipation, or abdominal pain  GENITOURINARY:  Negative frequency, urgency or dysuria  NEUROLOGIC:  No headache, confusion, dizziness, lightheadedness    MEDICATIONS  (STANDING):  aspirin  chewable 81 milliGRAM(s) Oral daily  cinacalcet 30 milliGRAM(s) Oral daily  clopidogrel Tablet 75 milliGRAM(s) Oral daily  docusate sodium 100 milliGRAM(s) Oral two times a day  heparin  Injectable 5000 Unit(s) SubCutaneous every 8 hours  insulin glargine Injectable (LANTUS) 10 Unit(s) SubCutaneous at bedtime  insulin lispro (HumaLOG) corrective regimen sliding scale   SubCutaneous <User Schedule>  insulin lispro (HumaLOG) corrective regimen sliding scale   SubCutaneous Before meals and at bedtime  insulin lispro Injectable (HumaLOG) 3 Unit(s) SubCutaneous three times a day before meals  Nephro-clint 1 Tablet(s) Oral daily  pantoprazole    Tablet 40 milliGRAM(s) Oral before breakfast  pregabalin 75 milliGRAM(s) Oral daily    MEDICATIONS  (PRN):  acetaminophen   Tablet .. 650 milliGRAM(s) Oral every 6 hours PRN Moderate Pain (4 - 6)  HYDROmorphone  Injectable 0.5 milliGRAM(s) IV Push every 4 hours PRN Severe Pain (7 - 10)      PHYSICAL EXAM  Vital Signs Last 24 Hrs  T(C): 36.8 (2019 09:11), Max: 37.9 (2019 21:00)  T(F): 98.3 (2019 09:11), Max: 100.3 (2019 21:00)  HR: 80 (2019 11:53) (80 - 100)  BP: 113/56 (2019 11:53) (113/56 - 150/69)  BP(mean): --  RR: 16 (2019 11:53) (16 - 17)  SpO2: 98% (2019 11:53) (97% - 99%)    Constitutional: wn/wd in NAD.   HEENT: NCAT, MMM, OP clear, EOMI, no proptosis or lid retraction  Neck: no thyromegaly or palpable thyroid nodules   Respiratory: lungs CTAB.  Cardiovascular: regular rhythm, normal S1 and S2, no audible murmurs, no peripheral edema  GI: soft, NT/ND, no masses/HSM appreciated.  Neurology: no tremors, DTR 2+  Skin: no visible rashes/lesions  Psychiatric: AAO x 3, normal affect/mood.    LABS:                        7.4    4.41  )-----------( 102      ( 2019 11:38 )             24.1     06-03    134<L>  |  97  |  52<H>  ----------------------------<  140<H>  4.1   |  24  |  6.71<H>    Ca    7.4<L>      2019 11:38  Phos  2.8       Mg     1.9                   HbA1C: 8.7 % ( @ 06:23)    CAPILLARY BLOOD GLUCOSE      POCT Blood Glucose.: 133 mg/dL (2019 10:45)  POCT Blood Glucose.: 234 mg/dL (2019 06:57)  POCT Blood Glucose.: 219 mg/dL (2019 00:16)  POCT Blood Glucose.: 221 mg/dL (2019 21:45)  POCT Blood Glucose.: 141 mg/dL (2019 16:31)  POCT Blood Glucose.: 89 mg/dL (2019 15:13)      A/P 78yFemale with hx of DM presenting for management of thrombosed AV fistula with improving glycemic control    1.  DM: type 2, uncontrolled  Restart levemir 14 units at bedtime and 3 units lispro with meals.   Continue lispro moderate dose scale with meals and low dose SS starting at 200 at bedtime.   Continue consistent carb diet  FSG Goal 100-180      Pt can follow up at discharge with [facility] by calling  to make an appointment.   Will discuss case with Dr. Hamilton   and update primary team INTERVAL HPI/OVERNIGHT EVENTS:    Patient is a 78y old  Female who presents with a chief complaint of hyperglycemia, thrombosed left arm AV fistula (2019 13:34)  Patient is feeling well. She is eating some but not all her meals. Patient refused humalog last night because she was worried about dropping too much overnight. The team then decided to give lantus 10 instead.     FSG & Insulin received:    Yesterday:  pre-dinner fs,141  nutritional lispro 0  units + 0  units lispro SS  bedtime fs  lantus 10  units +  2  units lispro SS    Today:  pre-breakfast fs  nutritional lispro 3  units+   2 units lispro SS  pre-lunch fs  nutritional lispro 3  units+ 0  units lispro SS    Pt reports the following symptoms:    CONSTITUTIONAL:  Negative fever or chills, feels well, good appetite  EYES:  Negative  blurry vision or double vision  CARDIOVASCULAR:  Negative for chest pain or palpitations  RESPIRATORY:  Negative for cough, wheezing, or SOB   GASTROINTESTINAL:  Negative for nausea, vomiting, diarrhea, constipation, or abdominal pain  GENITOURINARY:  Negative frequency, urgency or dysuria  NEUROLOGIC:  No headache, confusion, dizziness, lightheadedness    MEDICATIONS  (STANDING):  aspirin  chewable 81 milliGRAM(s) Oral daily  cinacalcet 30 milliGRAM(s) Oral daily  clopidogrel Tablet 75 milliGRAM(s) Oral daily  docusate sodium 100 milliGRAM(s) Oral two times a day  heparin  Injectable 5000 Unit(s) SubCutaneous every 8 hours  insulin glargine Injectable (LANTUS) 10 Unit(s) SubCutaneous at bedtime  insulin lispro (HumaLOG) corrective regimen sliding scale   SubCutaneous <User Schedule>  insulin lispro (HumaLOG) corrective regimen sliding scale   SubCutaneous Before meals and at bedtime  insulin lispro Injectable (HumaLOG) 3 Unit(s) SubCutaneous three times a day before meals  Nephro-clint 1 Tablet(s) Oral daily  pantoprazole    Tablet 40 milliGRAM(s) Oral before breakfast  pregabalin 75 milliGRAM(s) Oral daily    MEDICATIONS  (PRN):  acetaminophen   Tablet .. 650 milliGRAM(s) Oral every 6 hours PRN Moderate Pain (4 - 6)  HYDROmorphone  Injectable 0.5 milliGRAM(s) IV Push every 4 hours PRN Severe Pain (7 - 10)      PHYSICAL EXAM  Vital Signs Last 24 Hrs  T(C): 36.8 (2019 09:11), Max: 37.9 (2019 21:00)  T(F): 98.3 (2019 09:11), Max: 100.3 (2019 21:00)  HR: 80 (2019 11:53) (80 - 100)  BP: 113/56 (2019 11:53) (113/56 - 150/69)  BP(mean): --  RR: 16 (2019 11:53) (16 - 17)  SpO2: 98% (2019 11:53) (97% - 99%)    Constitutional: wn/wd in NAD.   HEENT: NCAT, MMM, OP clear, EOMI, no proptosis or lid retraction  Neck: no thyromegaly or palpable thyroid nodules   Respiratory: lungs CTAB.  Cardiovascular: regular rhythm, normal S1 and S2, no audible murmurs, no peripheral edema  GI: soft, NT/ND, no masses/HSM appreciated.  Neurology: no tremors, DTR 2+  Skin: no visible rashes/lesions  Psychiatric: AAO x 3, normal affect/mood.    LABS:                        7.4    4.41  )-----------( 102      ( 2019 11:38 )             24.1     06-03    134<L>  |  97  |  52<H>  ----------------------------<  140<H>  4.1   |  24  |  6.71<H>    Ca    7.4<L>      2019 11:38  Phos  2.8       Mg     1.9                   HbA1C: 8.7 % ( @ 06:23)    CAPILLARY BLOOD GLUCOSE      POCT Blood Glucose.: 133 mg/dL (2019 10:45)  POCT Blood Glucose.: 234 mg/dL (2019 06:57)  POCT Blood Glucose.: 219 mg/dL (2019 00:16)  POCT Blood Glucose.: 221 mg/dL (2019 21:45)  POCT Blood Glucose.: 141 mg/dL (2019 16:31)  POCT Blood Glucose.: 89 mg/dL (2019 15:13)      A/P 78yFemale with hx of DM presenting for management of thrombosed AV fistula with improving glycemic control    1.  DM: type 2, uncontrolled  Restart levemir 14 units at bedtime and 3 units lispro with meals.   Continue lispro low dose scale with meals and low dose SS starting at 250 with +2 at bedtime.   Continue consistent carb diet  FSG Goal 100-180      Pt can follow up at discharge with [facility] by calling  to make an appointment.   Will discuss case with Dr. Hamilton   and update primary team

## 2019-06-04 NOTE — PROGRESS NOTE ADULT - ASSESSMENT
This is 78 year old male with PMH stated before, now s/p revision of the AV fistula. The renal service follows the case for the need of HD - last HD done on 6/3 - 1.9 liters off. No need for HD today       problems       ESRD - MWF - schedule   - last HD done on 6/3  - we will do HD tomorrow next treatment - regular diet   - volume status acceptable   - electrolytes noted - no new labs   - in and outs   - daily weight   - renal diet     Renal bone disease   _ PLEASE STOP THE SEVELAMER - the phosphorus on the low side   - please obtain PTH level   - calcium noted - 7.4   - on cinacalcet 30 mg daily       Anemia   - hgb - 7.4 - no new labs   - transfusiona s per primary team   - please obtain - iron, ferritin, TIBC   - we will do EPO with HD     HTN   - well compensated   - no need for BP medications

## 2019-06-04 NOTE — PROGRESS NOTE ADULT - SUBJECTIVE AND OBJECTIVE BOX
Seen in the morning, no shortness of breath, no chest pain, no fever. Last HD done on 6/3 - 1.9 liters     The renal follows the case for the need of HD     Patient seen and examined at bedside.     acetaminophen   Tablet .. 650 milliGRAM(s) every 6 hours PRN  aspirin  chewable 81 milliGRAM(s) daily  cinacalcet 30 milliGRAM(s) daily  clopidogrel Tablet 75 milliGRAM(s) daily  docusate sodium 100 milliGRAM(s) two times a day  heparin  Injectable 5000 Unit(s) every 8 hours  HYDROmorphone  Injectable 0.5 milliGRAM(s) every 4 hours PRN  insulin glargine Injectable (LANTUS) 10 Unit(s) at bedtime  insulin lispro (HumaLOG) corrective regimen sliding scale   <User Schedule>  insulin lispro (HumaLOG) corrective regimen sliding scale   Before meals and at bedtime  insulin lispro Injectable (HumaLOG) 3 Unit(s) three times a day before meals  Nephro-clint 1 Tablet(s) daily  pantoprazole    Tablet 40 milliGRAM(s) before breakfast  pregabalin 75 milliGRAM(s) daily      Allergies    penicillins (Swelling)    Intolerances        T(C): , Max: 37.9 (06-03-19 @ 21:00)  T(F): , Max: 100.3 (06-03-19 @ 21:00)  HR: 80 (06-04-19 @ 11:53)  BP: 113/56 (06-04-19 @ 11:53)  BP(mean): --  RR: 16 (06-04-19 @ 11:53)  SpO2: 98% (06-04-19 @ 11:53)  Wt(kg): --    06-03 @ 07:01  -  06-04 @ 07:00  --------------------------------------------------------  IN:    Oral Fluid: 520 mL  Total IN: 520 mL    OUT:  Total OUT: 0 mL    Total NET: 520 mL      06-04 @ 07:01  -  06-04 @ 13:19  --------------------------------------------------------  IN:    Oral Fluid: 180 mL  Total IN: 180 mL    OUT:  Total OUT: 0 mL    Total NET: 180 mL      Physical exam:   Alert and oriented  No JVD   Normal s1/s2,RRR, no murmursm rubs or gallops   Abdomen - soft, not tender, not distended   Extremities: no edema   HAs a perm cath on the left of her chest   HAs a left AV fistula s/p revision       Height (cm): 170.18 (06-03 @ 17:51)  Weight (kg): 78.7 (06-03 @ 17:51)  BMI (kg/m2): 27.2 (06-03 @ 17:51)  BSA (m2): 1.9 (06-03 @ 17:51)      LABS:                        7.4    4.41  )-----------( 102      ( 03 Jun 2019 11:38 )             24.1     06-03    134<L>  |  97  |  52<H>  ----------------------------<  140<H>  4.1   |  24  |  6.71<H>    Ca    7.4<L>      03 Jun 2019 11:38  Phos  2.8     06-03  Mg     1.9     06-03                  RADIOLOGY & ADDITIONAL STUDIES:

## 2019-06-04 NOTE — PROGRESS NOTE ADULT - SUBJECTIVE AND OBJECTIVE BOX
O/N: Temp 100.3 Levemir chg'd to Lantus decr 10units per pt                                78F PMHx HTN, DM2, admitted with thrombosed LUE AVF s/p fistulogram with angioplasty, resection of thrombosed AVG and new interposition graft (5/31); hospital course c/b pressor requirements and hyperglycemia, off pressors now.    pain/nausea control  aspirin, plavix  IS/OOB  renal diabetic diet  HD MWF  s/p vancomycin (5/31-6/1)  ISS, levemir 10 and 2 units nutritional lispro tid  SQH. SCD   PIV, LIJ permacath, A-line (5/31-6/2)  LUE brachiocephalic AVG O/N: Temp 100.3 Levemir chg'd to Lantus decr 10units per pt    Subjective:  Feels the same as yesterday.  in the L arm. -CP/SOB.      Vital Signs Last 24 Hrs  T(C): 37.4 (04 Jun 2019 04:00), Max: 37.9 (03 Jun 2019 21:00)  T(F): 99.4 (04 Jun 2019 04:00), Max: 100.3 (03 Jun 2019 21:00)  HR: 89 (04 Jun 2019 06:05) (81 - 100)  BP: 150/69 (04 Jun 2019 06:05) (114/51 - 150/69)  BP(mean): --  RR: 17 (04 Jun 2019 06:05) (16 - 17)  SpO2: 99% (04 Jun 2019 06:05) (97% - 100%)    I&O's Summary  02 Jun 2019 07:01  -  03 Jun 2019 07:00  --------------------------------------------------------  IN: 20 mL / OUT: 0 mL / NET: 20 mL    03 Jun 2019 07:01  -  04 Jun 2019 06:49  --------------------------------------------------------  IN: 300 mL / OUT: 0 mL / NET: 300 mL    Physical Exam:  General: NAD, resting comfortably  Pulmonary: normal resp effort  Extremities: LUE graft with good thrill, mildly TTP  Neuro: A/O x 3, no focal deficits, sensation normal    LABS:                    7.4    4.41  )-----------( 102      ( 03 Jun 2019 11:38 )             24.1     06-03  134<L>  |  97  |  52<H>  ----------------------------<  140<H>  4.1   |  24  |  6.71<H>    Ca    7.4<L>      03 Jun 2019 11:38  Phos  2.8     06-03  Mg     1.9     06-03    CAPILLARY BLOOD GLUCOSE  POCT Blood Glucose.: 219 mg/dL (04 Jun 2019 00:16)  POCT Blood Glucose.: 221 mg/dL (03 Jun 2019 21:45)  POCT Blood Glucose.: 141 mg/dL (03 Jun 2019 16:31)  POCT Blood Glucose.: 89 mg/dL (03 Jun 2019 15:13)    78F PMHx HTN, DM2, admitted with thrombosed LUE AVF s/p fistulogram with angioplasty, resection of thrombosed AVG and new interposition graft (5/31); hospital course c/b pressor requirements and hyperglycemia, off pressors now.    pain/nausea control  aspirin, plavix  IS/OOB  renal diabetic diet  HD MWF  s/p vancomycin (5/31-6/1)  ISS, Lantus 10U, Lispro 3U qmeal  SQH. SCD   LUE brachiocephalic AVG  Dispo: Pending [facility] v. Home with PT

## 2019-06-05 ENCOUNTER — TRANSCRIPTION ENCOUNTER (OUTPATIENT)
Age: 79
End: 2019-06-05

## 2019-06-05 VITALS
OXYGEN SATURATION: 95 % | RESPIRATION RATE: 16 BRPM | DIASTOLIC BLOOD PRESSURE: 67 MMHG | SYSTOLIC BLOOD PRESSURE: 135 MMHG | HEART RATE: 75 BPM

## 2019-06-05 LAB
CALCIUM SERPL-MCNC: 7.5 MG/DL — LOW (ref 8.4–10.5)
GLUCOSE BLDC GLUCOMTR-MCNC: 114 MG/DL — HIGH (ref 70–99)
GLUCOSE BLDC GLUCOMTR-MCNC: 145 MG/DL — HIGH (ref 70–99)
GLUCOSE BLDC GLUCOMTR-MCNC: 221 MG/DL — HIGH (ref 70–99)
POTASSIUM SERPL-MCNC: 4.4 MMOL/L — SIGNIFICANT CHANGE UP (ref 3.5–5.3)
POTASSIUM SERPL-SCNC: 4.4 MMOL/L — SIGNIFICANT CHANGE UP (ref 3.5–5.3)
PTH-INTACT FLD-MCNC: 202 PG/ML — HIGH (ref 15–65)

## 2019-06-05 PROCEDURE — 83970 ASSAY OF PARATHORMONE: CPT

## 2019-06-05 PROCEDURE — C1757: CPT

## 2019-06-05 PROCEDURE — 36415 COLL VENOUS BLD VENIPUNCTURE: CPT

## 2019-06-05 PROCEDURE — 82803 BLOOD GASES ANY COMBINATION: CPT

## 2019-06-05 PROCEDURE — 99232 SBSQ HOSP IP/OBS MODERATE 35: CPT | Mod: GC

## 2019-06-05 PROCEDURE — 82010 KETONE BODYS QUAN: CPT

## 2019-06-05 PROCEDURE — 84295 ASSAY OF SERUM SODIUM: CPT

## 2019-06-05 PROCEDURE — 84132 ASSAY OF SERUM POTASSIUM: CPT

## 2019-06-05 PROCEDURE — 85025 COMPLETE CBC W/AUTO DIFF WBC: CPT

## 2019-06-05 PROCEDURE — C1889: CPT

## 2019-06-05 PROCEDURE — 86706 HEP B SURFACE ANTIBODY: CPT

## 2019-06-05 PROCEDURE — 87340 HEPATITIS B SURFACE AG IA: CPT

## 2019-06-05 PROCEDURE — 82330 ASSAY OF CALCIUM: CPT

## 2019-06-05 PROCEDURE — 86901 BLOOD TYPING SEROLOGIC RH(D): CPT

## 2019-06-05 PROCEDURE — 85027 COMPLETE CBC AUTOMATED: CPT

## 2019-06-05 PROCEDURE — 83036 HEMOGLOBIN GLYCOSYLATED A1C: CPT

## 2019-06-05 PROCEDURE — C1887: CPT

## 2019-06-05 PROCEDURE — 82310 ASSAY OF CALCIUM: CPT

## 2019-06-05 PROCEDURE — C1725: CPT

## 2019-06-05 PROCEDURE — 80061 LIPID PANEL: CPT

## 2019-06-05 PROCEDURE — 86900 BLOOD TYPING SEROLOGIC ABO: CPT

## 2019-06-05 PROCEDURE — 86923 COMPATIBILITY TEST ELECTRIC: CPT

## 2019-06-05 PROCEDURE — C1768: CPT

## 2019-06-05 PROCEDURE — 87521 HEPATITIS C PROBE&RVRS TRNSC: CPT

## 2019-06-05 PROCEDURE — 84100 ASSAY OF PHOSPHORUS: CPT

## 2019-06-05 PROCEDURE — 76000 FLUOROSCOPY <1 HR PHYS/QHP: CPT

## 2019-06-05 PROCEDURE — 86704 HEP B CORE ANTIBODY TOTAL: CPT

## 2019-06-05 PROCEDURE — 86850 RBC ANTIBODY SCREEN: CPT

## 2019-06-05 PROCEDURE — 82985 ASSAY OF GLYCATED PROTEIN: CPT

## 2019-06-05 PROCEDURE — 99285 EMERGENCY DEPT VISIT HI MDM: CPT

## 2019-06-05 PROCEDURE — 86705 HEP B CORE ANTIBODY IGM: CPT

## 2019-06-05 PROCEDURE — 80048 BASIC METABOLIC PNL TOTAL CA: CPT

## 2019-06-05 PROCEDURE — C1769: CPT

## 2019-06-05 PROCEDURE — 90935 HEMODIALYSIS ONE EVALUATION: CPT

## 2019-06-05 PROCEDURE — 97116 GAIT TRAINING THERAPY: CPT

## 2019-06-05 PROCEDURE — C1876: CPT

## 2019-06-05 PROCEDURE — 71045 X-RAY EXAM CHEST 1 VIEW: CPT

## 2019-06-05 PROCEDURE — 82962 GLUCOSE BLOOD TEST: CPT

## 2019-06-05 PROCEDURE — 86803 HEPATITIS C AB TEST: CPT

## 2019-06-05 PROCEDURE — 88304 TISSUE EXAM BY PATHOLOGIST: CPT

## 2019-06-05 PROCEDURE — C1894: CPT

## 2019-06-05 PROCEDURE — 83735 ASSAY OF MAGNESIUM: CPT

## 2019-06-05 PROCEDURE — 85018 HEMOGLOBIN: CPT

## 2019-06-05 RX ORDER — ASPIRIN/CALCIUM CARB/MAGNESIUM 324 MG
1 TABLET ORAL
Qty: 30 | Refills: 0
Start: 2019-06-05 | End: 2019-07-04

## 2019-06-05 RX ORDER — INSULIN LISPRO 100/ML
3 VIAL (ML) SUBCUTANEOUS
Qty: 3 | Refills: 0
Start: 2019-06-05 | End: 2019-07-04

## 2019-06-05 RX ORDER — INSULIN DETEMIR 100/ML (3)
14 INSULIN PEN (ML) SUBCUTANEOUS
Qty: 10 | Refills: 0
Start: 2019-06-05 | End: 2019-07-04

## 2019-06-05 RX ORDER — ERYTHROPOIETIN 10000 [IU]/ML
10000 INJECTION, SOLUTION INTRAVENOUS; SUBCUTANEOUS ONCE
Refills: 0 | Status: COMPLETED | OUTPATIENT
Start: 2019-06-05 | End: 2019-06-05

## 2019-06-05 RX ORDER — INSULIN DETEMIR 100/ML (3)
10 INSULIN PEN (ML) SUBCUTANEOUS
Qty: 0 | Refills: 0 | DISCHARGE

## 2019-06-05 RX ORDER — INSULIN LISPRO 100/ML
5 VIAL (ML) SUBCUTANEOUS
Qty: 0 | Refills: 0 | DISCHARGE

## 2019-06-05 RX ORDER — CLOPIDOGREL BISULFATE 75 MG/1
1 TABLET, FILM COATED ORAL
Qty: 30 | Refills: 0
Start: 2019-06-05 | End: 2019-07-04

## 2019-06-05 RX ADMIN — Medication 75 MILLIGRAM(S): at 14:26

## 2019-06-05 RX ADMIN — HEPARIN SODIUM 5000 UNIT(S): 5000 INJECTION INTRAVENOUS; SUBCUTANEOUS at 06:16

## 2019-06-05 RX ADMIN — Medication 3 UNIT(S): at 07:24

## 2019-06-05 RX ADMIN — CLOPIDOGREL BISULFATE 75 MILLIGRAM(S): 75 TABLET, FILM COATED ORAL at 14:26

## 2019-06-05 RX ADMIN — Medication 3 UNIT(S): at 14:25

## 2019-06-05 RX ADMIN — Medication 3 UNIT(S): at 16:58

## 2019-06-05 RX ADMIN — Medication 1 TABLET(S): at 14:26

## 2019-06-05 RX ADMIN — Medication 100 MILLIGRAM(S): at 06:15

## 2019-06-05 RX ADMIN — CINACALCET 30 MILLIGRAM(S): 30 TABLET, FILM COATED ORAL at 14:26

## 2019-06-05 RX ADMIN — ERYTHROPOIETIN 10000 UNIT(S): 10000 INJECTION, SOLUTION INTRAVENOUS; SUBCUTANEOUS at 11:15

## 2019-06-05 RX ADMIN — PANTOPRAZOLE SODIUM 40 MILLIGRAM(S): 20 TABLET, DELAYED RELEASE ORAL at 06:15

## 2019-06-05 RX ADMIN — Medication 81 MILLIGRAM(S): at 14:26

## 2019-06-05 RX ADMIN — HEPARIN SODIUM 5000 UNIT(S): 5000 INJECTION INTRAVENOUS; SUBCUTANEOUS at 14:25

## 2019-06-05 NOTE — PROGRESS NOTE ADULT - SUBJECTIVE AND OBJECTIVE BOX
O/N: STEFFI, VSS                            78F PMHx HTN, DM2, admitted with thrombosed LUE AVF s/p fistulogram with angioplasty, resection of thrombosed AVG and new interposition graft (5/31); hospital course c/b pressor requirements and hyperglycemia, off pressors now.    pain/nausea control  aspirin, plavix  IS/OOB  renal diabetic diet  HD MWF  s/p vancomycin (5/31-6/1)  ISS, Lantus 10U, Lispro 3U qmeal  SQH. SCD   LUE brachiocephalic AVG  Dispo: Pending [facility] v. Home with PT 24 hr events  O/N: STEFFI, VSS    Subjective:  No acute complaints. Denies pain. Tolerating diet. -CP/SOB.    Vital Signs Last 24 Hrs  T(C): 36.3 (05 Jun 2019 06:22), Max: 37.6 (04 Jun 2019 23:36)  T(F): 97.3 (05 Jun 2019 06:22), Max: 99.7 (04 Jun 2019 23:36)  HR: 84 (05 Jun 2019 05:50) (80 - 85)  BP: 139/63 (05 Jun 2019 05:50) (113/56 - 150/65)  BP(mean): --  RR: 18 (05 Jun 2019 05:50) (16 - 18)  SpO2: 100% (05 Jun 2019 05:50) (97% - 100%)    I&O's Summary  03 Jun 2019 07:01  -  04 Jun 2019 07:00  --------------------------------------------------------  IN: 520 mL / OUT: 0 mL / NET: 520 mL    04 Jun 2019 07:01  -  05 Jun 2019 06:47  --------------------------------------------------------  IN: 180 mL / OUT: 0 mL / NET: 180 mL    Physical Exam:  General: NAD, resting comfortably  Pulmonary: normal resp effort  Extremities: LUE graft with good thrill  Neuro: A/O x 3    LABS:                  7.4    4.41  )-----------( 102      ( 03 Jun 2019 11:38 )             24.1     06-03    134<L>  |  97  |  52<H>  ----------------------------<  140<H>  4.1   |  24  |  6.71<H>    Ca    7.4<L>      03 Jun 2019 11:38  Phos  2.8     06-03  Mg     1.9     06-03      CAPILLARY BLOOD GLUCOSE  POCT Blood Glucose.: 221 mg/dL (05 Jun 2019 05:56)  POCT Blood Glucose.: 233 mg/dL (04 Jun 2019 21:11)  POCT Blood Glucose.: 142 mg/dL (04 Jun 2019 16:03)  POCT Blood Glucose.: 133 mg/dL (04 Jun 2019 10:45)  POCT Blood Glucose.: 234 mg/dL (04 Jun 2019 06:57)    78F PMHx HTN, DM2, admitted with thrombosed LUE AVF s/p fistulogram with angioplasty, resection of thrombosed AVG and new interposition graft (5/31); hospital course c/b pressor requirements and hyperglycemia, off pressors now.    pain/nausea control  aspirin, plavix  IS/OOB  renal diabetic diet  HD MWF  ISS, Lantus 10U, Lispro 3U qmeal  SQH. SCD   LUE brachiocephalic AVG  Dispo: Pending PT re-eval for PT vs. HPT

## 2019-06-05 NOTE — PROGRESS NOTE ADULT - NSHPATTENDINGPLANDISCUSS_GEN_ALL_CORE
SICU team
Dr. Velazquez and [facility] staff
Dr. Velazquez and [facility] staff
Dr. Kwon, Ms. Villarreal, patient and Surg staff

## 2019-06-05 NOTE — PROGRESS NOTE ADULT - SUBJECTIVE AND OBJECTIVE BOX
Patient was seen and evaluated on dialysis.   Patient is tolerating the procedure well.   HR: 78 (06-05-19 @ 09:15)  BP: 111/51 (06-05-19 @ 09:15) pusle 65, /67  Continue dialysis:   Dialyzer:  180        QB:    400    QD: 500  Goal UF 1.5 liters over 3 Hours

## 2019-06-05 NOTE — PROGRESS NOTE ADULT - REASON FOR ADMISSION
hyperglycemia, thrombosed left arm AV fistula

## 2019-06-05 NOTE — PROGRESS NOTE ADULT - ASSESSMENT
This is 78 year old male with PMH stated before, now s/p revision of the AV fistula. The renal service follows the case for the need of HD - last HD done on 6/3 - 1.9 liters off.  we will do HD today       problems       ESRD - MWF - schedule   - last HD done on 6/3  - we will do HD today   - volume status acceptable   - electrolytes noted - potassium noted - 4.4   - in and outs   - daily weight   - renal diet     Renal bone disease   _ PLEASE STOP THE SEVELAMER - the phosphorus on the low side   - please obtain PTH level   - calcium noted - 7.4   - on cinacalcet 30 mg daily       Anemia   - hgb - 7.4 - no new labs   - transfusion as per primary team   - please obtain - iron, ferritin, TIBC   - we will do EPO with HD     HTN   - well compensated   - no need for BP medications

## 2019-06-05 NOTE — DISCHARGE NOTE NURSING/CASE MANAGEMENT/SOCIAL WORK - NSDCDPATPORTLINK_GEN_ALL_CORE
You can access the [facility] Portal, offered by [facility], by registering with the following website: [email or web address]

## 2019-06-05 NOTE — PROGRESS NOTE ADULT - SUBJECTIVE AND OBJECTIVE BOX
Seen in the morning, no shortness of breath, no chest pain, no fever. Last HD done on 6/3 - 1.9 liters     The renal follows the case for the need of HD     Patient seen and examined at bedside.     acetaminophen   Tablet .. 650 milliGRAM(s) every 6 hours PRN  aspirin  chewable 81 milliGRAM(s) daily  cinacalcet 30 milliGRAM(s) daily  clopidogrel Tablet 75 milliGRAM(s) daily  docusate sodium 100 milliGRAM(s) two times a day  heparin  Injectable 5000 Unit(s) every 8 hours  HYDROmorphone  Injectable 0.5 milliGRAM(s) every 4 hours PRN  insulin detemir injectable (LEVEMIR) 14 Unit(s) at bedtime  insulin lispro (HumaLOG) corrective regimen sliding scale   three times a day before meals  insulin lispro (HumaLOG) corrective regimen sliding scale   at bedtime  insulin lispro Injectable (HumaLOG) 3 Unit(s) three times a day before meals  Nephro-clint 1 Tablet(s) daily  pantoprazole    Tablet 40 milliGRAM(s) before breakfast  pregabalin 75 milliGRAM(s) daily      Allergies    penicillins (Swelling)    Intolerances        T(C): , Max: 37.6 (06-04-19 @ 23:36)  T(F): , Max: 99.7 (06-04-19 @ 23:36)  HR: 78 (06-05-19 @ 09:15)  BP: 111/51 (06-05-19 @ 09:15)  BP(mean): --  RR: 16 (06-05-19 @ 09:15)  SpO2: 94% (06-05-19 @ 09:15)  Wt(kg): --    06-04 @ 07:01  -  06-05 @ 07:00  --------------------------------------------------------  IN:    Oral Fluid: 180 mL  Total IN: 180 mL    OUT:  Total OUT: 0 mL    Total NET: 180 mL      06-05 @ 07:01  -  06-05 @ 13:02  --------------------------------------------------------  IN:    Oral Fluid: 180 mL  Total IN: 180 mL    OUT:  Total OUT: 0 mL    Total NET: 180 mL              LABS:    06-05    x   |  x   |  x   ----------------------------<  x   4.4   |  x   |  x       Physical exam:   Alert and oriented  No JVD   Normal s1/s2,RRR, no murmursm rubs or gallops   Abdomen - soft, not tender, not distended   Extremities: no edema   HAs a perm cath on the left of her chest   HAs a left AV fistula s/p revision                 RADIOLOGY & ADDITIONAL STUDIES:

## 2019-06-05 NOTE — PROGRESS NOTE ADULT - ATTENDING COMMENTS
A/P 78yFemale with hx of DM presenting for management of thrombosed AV fistula with improving glycemic control    1.  DM: type 2, uncontrolled  increase levemir to 14 units at bedtime, conitnue 4 units lispro with meals.   Continue lispro moderate dose scale with meals and at bedtime.   Continue consistent carb diet  FSG Goal 100-180    2.  Hyperlipidemia - goal LDL < 70  check lipid profile  statin tx for LDL above goal    Dr. Silver to follow
Pt seen on rounds this afternoon and events of the weekend reviewed.  Glucoses have been somewhat erratic, though most of the higher numbers have occurred because of Humalog doses which were held for FS < 100.  Although the morning fingersticks in the 180 range would appear to indicate the need for a Levamir dose higher than 14 units, will leave at that dose given her report of occasional AM hypoglycemia even on the 10 unit dose which she took at home.    Will also decrease the standing pre-meal Humalog to 3 units, but emphasized to the pt that she needs to take this (assuming that she will be eating) even when her blood sugar is < 150.  This will likely take some adjustment on her part, having dealt only with "coverage" up to now.  She is understandably fearful of hypoglycemia
A/P 78yFemale with hx of DM presenting for managemnet of thrombosed AV fistula with moderate hyperlgycemia.     1.  DM: type 2, uncontrolled, complicated.   conitnue levemir 12 units at bedtime  lispro 4 units TID with meals.   Continue lispro moderate dose scale with meals and at bedtime.   Continue consistent carb diet  FSG Goal 100-180    2.  Hyperlipidemia - goal LDL < 70  check lipid profile  statin tx if above goal
Pt seen on rounds this afternoon.  Received only 10 units of Lantus last night because of her concern about taking Humalog coverage at bedtime, which likely explains the elevated AM fingerstick of 234 today.  She then decreased to 133 pre-lunch.  Will increase the bedtime dose back to 14 units of Levemir for tonight, but leave her pre-meal standing Humalog at 3 units.  Explained to her once again the rationale (and need) for a standing pre-meal dose even when her fingersticks are < 150.  Will limit her coverage at bedtime to values over 250, partially because of her ESRD (and overnight insulin sensitivity), partially because of her fear of hypoglycemia overnight
78 year old female with CAD with h/o ESRD on HD was admitted for open thrombectomy of thrombosed AV fistula and transferred to SICU post operatively for persistent shock on phenylephrine. Midodrine was added. Continue to titrate phenylephrine. Rest as above
78 year old female with CAD with h/o ESRD on HD was admitted for open thrombectomy of thrombosed AV fistula and transferred to SICU post operatively for persistent shock, currently off phenylephrine. Midodrine 5 mg tid was added yesterday. Few readings of low BP readings. Increase Midodrine to 10 mg tid. Plan to titrate and possibly d/c midodrine in future. May be transferred to floor if BP remains stable. Rest as above.
Pt seen on rounds this afternoon.  Plan for discharge later today noted.  Glucose was over 200 last night, and stayed over 200 to this morning despite the 14 units of Levemir.  The 3 units of pre-meal Humalog may not be sufficient as a standing dose, and should probably be increased.  At this point we will err on the side of caution, and leave the Levemir at 14 units post-discharge, only because of pt's report of intermittent AM hypoglycemia on the 10 unit dose at home.  I nonetheless told her that she likely needs more than 14 units, and she should see how her blood sugars are in the morning during the first week at home before increasing, and then only in 2 unit increments, and that she should discuss this with her endocrinologist.  Will increase the pre-meal to 4 units, but emphasized to her that she needs to include carbohydrates with her meals--(her blood sugar fell to 134 before lunch today only because she failed to eat any of the starch at breakfast.  She now seems to understand the need for a standing pre-meal bolus even when her FS is below 150.  She will follow up with her outside endocrinologist

## 2019-06-05 NOTE — PROGRESS NOTE ADULT - SUBJECTIVE AND OBJECTIVE BOX
INTERVAL HPI/OVERNIGHT EVENTS:    Patient seen at bedside.     Pt reports the following symptoms:    CONSTITUTIONAL:  Negative fever or chills, feels well, good appetite  EYES:  Negative  blurry vision or double vision  CARDIOVASCULAR:  Negative for chest pain or palpitations  RESPIRATORY:  Negative for cough, wheezing, or SOB   GASTROINTESTINAL:  Negative for nausea, vomiting, diarrhea, constipation, or abdominal pain  GENITOURINARY:  Negative frequency, urgency or dysuria  NEUROLOGIC:  No headache, confusion, dizziness, lightheadedness    MEDICATIONS  (STANDING):  aspirin  chewable 81 milliGRAM(s) Oral daily  cinacalcet 30 milliGRAM(s) Oral daily  clopidogrel Tablet 75 milliGRAM(s) Oral daily  docusate sodium 100 milliGRAM(s) Oral two times a day  heparin  Injectable 5000 Unit(s) SubCutaneous every 8 hours  insulin detemir injectable (LEVEMIR) 14 Unit(s) SubCutaneous at bedtime  insulin lispro (HumaLOG) corrective regimen sliding scale   SubCutaneous three times a day before meals  insulin lispro (HumaLOG) corrective regimen sliding scale   SubCutaneous at bedtime  insulin lispro Injectable (HumaLOG) 3 Unit(s) SubCutaneous three times a day before meals  Nephro-clint 1 Tablet(s) Oral daily  pantoprazole    Tablet 40 milliGRAM(s) Oral before breakfast  pregabalin 75 milliGRAM(s) Oral daily    MEDICATIONS  (PRN):  acetaminophen   Tablet .. 650 milliGRAM(s) Oral every 6 hours PRN Moderate Pain (4 - 6)  HYDROmorphone  Injectable 0.5 milliGRAM(s) IV Push every 4 hours PRN Severe Pain (7 - 10)    PHYSICAL EXAM  Vital Signs Last 24 Hrs  T(C): 37.1 (05 Jun 2019 09:15), Max: 37.6 (04 Jun 2019 23:36)  T(F): 98.7 (05 Jun 2019 09:15), Max: 99.7 (04 Jun 2019 23:36)  HR: 78 (05 Jun 2019 09:15) (78 - 85)  BP: 111/51 (05 Jun 2019 09:15) (111/51 - 150/65)  BP(mean): --  RR: 16 (05 Jun 2019 09:15) (16 - 18)  SpO2: 94% (05 Jun 2019 09:15) (94% - 100%)    Constitutional: wn/wd in NAD.   HEENT: NCAT, MMM, OP clear, EOMI, no proptosis or lid retraction  Neck: no thyromegaly or palpable thyroid nodules   Respiratory: lungs CTAB.  Cardiovascular: regular rhythm, normal S1 and S2, no audible murmurs, no peripheral edema  GI: soft, NT/ND, no masses/HSM appreciated.  Neurology: no tremors, DTR 2+  Skin: no visible rashes/lesions  Psychiatric: AAO x 3, normal affect/mood.    LABS:    06-05    x   |  x   |  x   ----------------------------<  x   4.4   |  x   |  x     HbA1C: 8.7 % (06-01 @ 06:23)    CAPILLARY BLOOD GLUCOSE  POCT Blood Glucose.: 221 mg/dL (05 Jun 2019 05:56)  POCT Blood Glucose.: 233 mg/dL (04 Jun 2019 21:11)  POCT Blood Glucose.: 142 mg/dL (04 Jun 2019 16:03) INTERVAL HPI/OVERNIGHT EVENTS:    Patient seen at bedside. S/p HD today. Patient is for discharge today. She ate half of a turkey sandwich yesterday for dinner (without the bread), For breakfast she ate turkey links and eggs. She is eating a late lunch, awaiting her pre meal insulin.     FSG & Insulin received:  Yesterday:  pre-dinner fs  nutritional lispro  3 units +   units lispro SS  bedtime fs  lantus 14 units +    units lispro SS    Today:  pre-breakfast fs  nutritional lispro  3 units+    units lispro SS  pre-lunch fs  nutritional lispro  units+   units lispro SS    Pt reports the following symptoms:    CONSTITUTIONAL:  Negative fever or chills, feels well, good appetite  EYES:  Negative  blurry vision or double vision  CARDIOVASCULAR:  Negative for chest pain or palpitations  RESPIRATORY:  Negative for cough, wheezing, or SOB   GASTROINTESTINAL:  Negative for nausea, vomiting, diarrhea, constipation, or abdominal pain  GENITOURINARY:  Negative frequency, urgency or dysuria  NEUROLOGIC:  No headache, confusion, dizziness, lightheadedness    MEDICATIONS  (STANDING):  aspirin  chewable 81 milliGRAM(s) Oral daily  cinacalcet 30 milliGRAM(s) Oral daily  clopidogrel Tablet 75 milliGRAM(s) Oral daily  docusate sodium 100 milliGRAM(s) Oral two times a day  heparin  Injectable 5000 Unit(s) SubCutaneous every 8 hours  insulin detemir injectable (LEVEMIR) 14 Unit(s) SubCutaneous at bedtime  insulin lispro (HumaLOG) corrective regimen sliding scale   SubCutaneous three times a day before meals  insulin lispro (HumaLOG) corrective regimen sliding scale   SubCutaneous at bedtime  insulin lispro Injectable (HumaLOG) 3 Unit(s) SubCutaneous three times a day before meals  Nephro-clint 1 Tablet(s) Oral daily  pantoprazole    Tablet 40 milliGRAM(s) Oral before breakfast  pregabalin 75 milliGRAM(s) Oral daily    MEDICATIONS  (PRN):  acetaminophen   Tablet .. 650 milliGRAM(s) Oral every 6 hours PRN Moderate Pain (4 - 6)  HYDROmorphone  Injectable 0.5 milliGRAM(s) IV Push every 4 hours PRN Severe Pain (7 - 10)    PHYSICAL EXAM  Vital Signs Last 24 Hrs  T(C): 37.1 (2019 09:15), Max: 37.6 (2019 23:36)  T(F): 98.7 (2019 09:15), Max: 99.7 (2019 23:36)  HR: 78 (2019 09:15) (78 - 85)  BP: 111/51 (2019 09:15) (111/51 - 150/65)  BP(mean): --  RR: 16 (2019 09:15) (16 - 18)  SpO2: 94% (2019 09:15) (94% - 100%)    Constitutional: wn/wd in NAD.   HEENT: NCAT, OP clear, EOMI, no proptosis or lid retraction  Neck: no thyromegaly or palpable thyroid nodules   Respiratory: lungs CTAB.  Cardiovascular: regular rhythm, normal S1 and S2, no audible murmurs, no peripheral edema  GI: soft, NT/ND, no masses/HSM appreciated.  Neurology: no tremors, DTR 2+  Skin: no visible rashes/lesions  Psychiatric: AAO x 3, normal affect/mood.    LABS:        x   |  x   |  x   ----------------------------<  x   4.4   |  x   |  x     HbA1C: 8.7 % ( @ 06:23)    CAPILLARY BLOOD GLUCOSE  POCT Blood Glucose.: 221 mg/dL (2019 05:56)  POCT Blood Glucose.: 233 mg/dL (2019 21:11)  POCT Blood Glucose.: 142 mg/dL (2019 16:03)    A/P: 78 y Female with hx of DM presenting for management of thrombosed AV fistula with improving glycemic control. INTERVAL HPI/OVERNIGHT EVENTS:    Patient seen at bedside. S/p HD today. Patient is for discharge today. She ate half of a turkey sandwich yesterday for dinner (without the bread), For breakfast she ate turkey links and eggs. She is eating a late lunch, awaiting her pre meal insulin.     FSG & Insulin received:  Yesterday:  pre-dinner fs  nutritional lispro  3 units +   units lispro SS  bedtime fs  lantus 14 units +    units lispro SS    Today:  pre-breakfast fs  nutritional lispro  3 units+    units lispro SS  pre-lunch fs  nutritional lispro  units+   units lispro SS    Pt reports the following symptoms:    CONSTITUTIONAL:  Negative fever or chills, feels well, good appetite  EYES:  Negative  blurry vision or double vision  CARDIOVASCULAR:  Negative for chest pain or palpitations  RESPIRATORY:  Negative for cough, wheezing, or SOB   GASTROINTESTINAL:  Negative for nausea, vomiting, diarrhea, constipation, or abdominal pain  GENITOURINARY:  Negative frequency, urgency or dysuria  NEUROLOGIC:  No headache, confusion, dizziness, lightheadedness    MEDICATIONS  (STANDING):  aspirin  chewable 81 milliGRAM(s) Oral daily  cinacalcet 30 milliGRAM(s) Oral daily  clopidogrel Tablet 75 milliGRAM(s) Oral daily  docusate sodium 100 milliGRAM(s) Oral two times a day  heparin  Injectable 5000 Unit(s) SubCutaneous every 8 hours  insulin detemir injectable (LEVEMIR) 14 Unit(s) SubCutaneous at bedtime  insulin lispro (HumaLOG) corrective regimen sliding scale   SubCutaneous three times a day before meals  insulin lispro (HumaLOG) corrective regimen sliding scale   SubCutaneous at bedtime  insulin lispro Injectable (HumaLOG) 3 Unit(s) SubCutaneous three times a day before meals  Nephro-clint 1 Tablet(s) Oral daily  pantoprazole    Tablet 40 milliGRAM(s) Oral before breakfast  pregabalin 75 milliGRAM(s) Oral daily    MEDICATIONS  (PRN):  acetaminophen   Tablet .. 650 milliGRAM(s) Oral every 6 hours PRN Moderate Pain (4 - 6)  HYDROmorphone  Injectable 0.5 milliGRAM(s) IV Push every 4 hours PRN Severe Pain (7 - 10)    PHYSICAL EXAM  Vital Signs Last 24 Hrs  T(C): 37.1 (2019 09:15), Max: 37.6 (2019 23:36)  T(F): 98.7 (2019 09:15), Max: 99.7 (2019 23:36)  HR: 78 (2019 09:15) (78 - 85)  BP: 111/51 (2019 09:15) (111/51 - 150/65)  BP(mean): --  RR: 16 (2019 09:15) (16 - 18)  SpO2: 94% (2019 09:15) (94% - 100%)    Constitutional: wn/wd in NAD.   HEENT: NCAT, OP clear, EOMI, no proptosis or lid retraction  Neck: no thyromegaly or palpable thyroid nodules   Respiratory: lungs CTAB.  Cardiovascular: regular rhythm, normal S1 and S2, no audible murmurs, no peripheral edema  GI: soft, NT/ND, no masses/HSM appreciated.  Neurology: no tremors, DTR 2+  Skin: no visible rashes/lesions  Psychiatric: AAO x 3, normal affect/mood.    LABS:        x   |  x   |  x   ----------------------------<  x   4.4   |  x   |  x     HbA1C: 8.7 % ( @ 06:23)    CAPILLARY BLOOD GLUCOSE  POCT Blood Glucose.: 221 mg/dL (2019 05:56)  POCT Blood Glucose.: 233 mg/dL (2019 21:11)  POCT Blood Glucose.: 142 mg/dL (2019 16:03)    A/P: 78 y Female with hx of DM presenting for management of thrombosed AV fistula with improving glycemic control.     1.  DM: type 2, uncontrolled    D/c recs:   Continue levemir 14 units at bedtime and 3 units lispro with meals.   Continue lispro low dose scale with meals at home starting at 150, 1 units for every BG 50mg/dl over 150     Pt can follow up at discharge with [facility] by calling  to make an appointment.   Will discuss case with Dr. Hamilton   and update primary team

## 2019-06-07 LAB — SURGICAL PATHOLOGY STUDY: SIGNIFICANT CHANGE UP

## 2019-06-10 RX ORDER — INSULIN DETEMIR 100/ML (3)
14 INSULIN PEN (ML) SUBCUTANEOUS
Qty: 1 | Refills: 0
Start: 2019-06-10 | End: 2019-06-30

## 2019-06-12 DIAGNOSIS — D64.9 ANEMIA, UNSPECIFIED: ICD-10-CM

## 2019-06-12 DIAGNOSIS — M79.606 PAIN IN LEG, UNSPECIFIED: ICD-10-CM

## 2019-06-12 DIAGNOSIS — I95.81 POSTPROCEDURAL HYPOTENSION: ICD-10-CM

## 2019-06-12 DIAGNOSIS — I25.10 ATHEROSCLEROTIC HEART DISEASE OF NATIVE CORONARY ARTERY WITHOUT ANGINA PECTORIS: ICD-10-CM

## 2019-06-12 DIAGNOSIS — T82.868A THROMBOSIS DUE TO VASCULAR PROSTHETIC DEVICES, IMPLANTS AND GRAFTS, INITIAL ENCOUNTER: ICD-10-CM

## 2019-06-12 DIAGNOSIS — I13.2 HYPERTENSIVE HEART AND CHRONIC KIDNEY DISEASE WITH HEART FAILURE AND WITH STAGE 5 CHRONIC KIDNEY DISEASE, OR END STAGE RENAL DISEASE: ICD-10-CM

## 2019-06-12 DIAGNOSIS — Z85.3 PERSONAL HISTORY OF MALIGNANT NEOPLASM OF BREAST: ICD-10-CM

## 2019-06-12 DIAGNOSIS — E11.22 TYPE 2 DIABETES MELLITUS WITH DIABETIC CHRONIC KIDNEY DISEASE: ICD-10-CM

## 2019-06-12 DIAGNOSIS — K21.9 GASTRO-ESOPHAGEAL REFLUX DISEASE WITHOUT ESOPHAGITIS: ICD-10-CM

## 2019-06-12 DIAGNOSIS — Z91.041 RADIOGRAPHIC DYE ALLERGY STATUS: ICD-10-CM

## 2019-06-12 DIAGNOSIS — N18.6 END STAGE RENAL DISEASE: ICD-10-CM

## 2019-06-12 DIAGNOSIS — Z88.0 ALLERGY STATUS TO PENICILLIN: ICD-10-CM
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
## 2019-06-12 DIAGNOSIS — D62 ACUTE POSTHEMORRHAGIC ANEMIA: ICD-10-CM

## 2019-06-12 DIAGNOSIS — I50.9 HEART FAILURE, UNSPECIFIED: ICD-10-CM

## 2019-06-12 DIAGNOSIS — E11.65 TYPE 2 DIABETES MELLITUS WITH HYPERGLYCEMIA: ICD-10-CM

## 2019-06-12 DIAGNOSIS — R57.9 SHOCK, UNSPECIFIED: ICD-10-CM

## 2019-06-12 DIAGNOSIS — Z99.2 DEPENDENCE ON RENAL DIALYSIS: ICD-10-CM

## 2020-01-16 NOTE — ASU PATIENT PROFILE, ADULT - AS SC BRADEN FRICTION
Cardiology Clinic Progress Note  Ras Esparza MRN# [id number]   YOB: 1955 Age: 64 year old   Primary Cardiologist: Dr. Adam  Reason for visit: cardiology follow up            Assessment and Plan:   Ras Esparza is a very pleasant 64 year old male with history of hypertension and tobacco use, who has not sought out routine care, recently hospitalizated 6/21/19-6/25/19 where he was diagnosed with HFrEF (likely ischemic), hypertension, acute kidney injury secondary to bladder outlet obstruction with bilateral hydronephrosis and hydroureter and anemia. Patient here today for cardiology follow up.     1.  Chronic systolic heart failure/HFrEF - Echo 10/15 shows improvement in LV function, EF 30% -> 46%, LV size normal, RV size and function. Etiology of cardiomyopathy unknown nuclear stress test completed which showed mild ischemia, no anginal symptoms, plan for continued medical management. Patient has been on optimal HF therapy. No ACEI/ARB or aldactone antagonist due to renal failure. Patient appears compensated and euvolemic today, weight has slightly increased since last OV (approx 5#), reports typically 235# at home, this weight gain appears related to increased caloric intake and decreased activity after hernia surgery. Patient is not in clinical HF on exam.   - NYHA class II, stage C   - Etiology : unknown, ? CAD/ICM mild ischemia on nuclear stress test, no anginal symptoms.    - Fluid status : euvolemic    - Goal weight: ~ 230#   - Diuretic regimen : none   - Last RHC : none   - Ischemic evaluation : Nuclear stress test showed mild ischemia, given no anginal symptoms and elevated creatinine decision made to continue medical management.    - Guideline directed medical therapy    - Betablocker: carvedilol 12.5mg BID    - ACEI/ARB/ARNI: none r/t LEIGHA/CKD    - Aldactone antagonist: none r/t LEIGHA/CKD    - Continue isosorbide mononitrate 30mg daily    - Continue hydralazine 100mg TID   - Sudden  cardiac death prophylaxis : N/A EF > 35%   - Reinforced HF education today, reviewed low sodium diet, reviewed when to notify clinic.     2. Chronic renal failure - secondary to bladder outlet obstruction with bilateral hydronephrosis and hydroureter, his initial creatinine was 9.59. Creatinine improved to ~ 3, kidney function today 3.41. S/p TURP.    - Following with nephrology   - Plan to recheck BMP in 1-2 weeks to ensure potassium level decreases and to monitor kidney function.     3. Hypertension - controlled, patient has only taken his hydralazine this morning, encouraged him to start taking his carvedilol in the morning. Continue current medication regimen.    - Advised to monitor BP at home and bring log to follow up appointment   - Counseled patient on lifestyle modifications to help manage BP    4. Former tobacco use - quit tobacco use February 2019. Has remained smoking cessation.     5. Hyperlipidemia - patient notes he stopped taking his atorvastatin 2 months ago, lipid panel 9/2019 showed total cholesterol 116, HDL 46, LDL 57 and triglycerides 67. Lipid panel today stable, total cholesterol 112, HDL 41, LDL 58 and triglycerides 67. No known coronary disease. Patient wishes to remain off atorvastatin, which is reasonable given controlled cholesterol levels.     6. Hyperkalemia - appears secondary to dietary indiscretions with high sodium foods. Counseled patient on high and low sodium foods.    - Recheck BMP in 1-2 weeks at [facility].         Changes today: none    Follow up plan:     Recheck labs (BMP) in 1 - 2 weeks     Follow up with me 3 months with labs prior, sooner as needed.         History of Presenting Illness:    Ras Esparza is a very pleasant 64 year old male with history of hypertension and tobacco use, who has not sought out routine care, recently hospitalization where he was diagnosed with HFrEF (likely ischemic), hypertension, acute kidney injury secondary to bladder outlet  obstruction with bilateral hydronephrosis and hydroureter and anemia.     Patient hospitalized 6/21/19-6/25/19 related to acute kidney injury secondary to bladder outlet obstruction with bilateral hydronephrosis and hydroureter, his initial creatinine was 9.59. Creatinine improved to 4.73 6/28/19 without needing dialysis. NTproBNP 45,492. Complaints of worsening exertional dyspnea, lower extremity edema and one episode of typical chest pain/diaphoresis lasting 2hrs approx 1-2 months ago. Echocardiogram shows EF 30% with severe global hypokinesis, LV moderately dilated, RV normal size/function, no significant valvular disease. Etiology of cardiomyopathy unknown at this time, ischemic cardiomyopathy not excluded. Coronary angiogram not pursued during hospital stay due to LEIGHA. Patient started on carvedilol, hydralazine and imdur. No ACEI/ARB or aldactone antagonist due to renal failure. Vascular calcifications noted on CT of abdomen/pelvis- started on statin therapy. Admission weight 256# Discharge weight 237#. Patient was discharged with milligan catheter and has been following with urology, underwent TURP on 11/21/19.    Patient was seen by Dr. Adam 9/30/19 at which point he was having no ischemic symptoms, clinically not in HF, did note presyncopal episodes for which event monitor was placed. Consideration for coronary angiogram was reviewed and ultimately decided to proceed with nuclear stress test. Blood pressure was elevated and hydralazine was increased. Echocardiogram and nuclear stress ordered.     Nuclear stress test 10/3 demonstrated partially reversible inferior and basal inferolateral defect. Small, reversible basal inferolateral defect suggests mild ischemia in the circumflex distribution. Dr. Adam recommends continued medical management given mild basal inferolateral ischemia, no anginal symptoms, and continued elevated creatinine. Echocardiogram 10/15 showed improvement in LV function with an EF of 46%, RV  "normal size and function, no significant valvular disease, mild aortic root dilatation (stable). Event monitor showed no arrhythmias.     I first met patient in November 2019 for cardiology follow up, at which point patient was doing well from a heart failure standpoint. During his cardiology follow up with me his hydralazine has been optimized to control blood pressure and for management of his heart failure.     Patient is here today for cardiology follow up.     Patient reports feeling good. Had hernia surgery the end of December, states taking a while to recover from that. Weight has stabled out, reports typically ~ 235#, which is up about 5# since last clinic visit. Weight had increased but this was correlated with increased appetite. He also notes activity levels have been limited due to hernia surgery. Energy levels are good. Patient denies chest pain or chest tightness. Denies shortness of breath at rest. Denies exertional dyspnea, able to go up stairs and household chores with no difficulty. Denies lower extremity edema. Denies abdominal distention/bloating. Sleeping good. Denies orthopnea or PND. Denies dizziness, lightheadedness or other presyncopal symptoms, notes no recurrent symptoms. Denies tachycardia or palpitations. Denies hematochezia, hematuria, melena or hemoptysis. Has only taken his hydralazine this morning.     Labs from today show slight bump in kidney function, creatinine 3.41, potassium elevated at 5.8. States he has been drinking a lot of orange juice yesterday and today, which has been new, this could explain elevated potassium. Lipid panel today showed  total cholesterol 116, HDL 46, LDL 57 and triglycerides 67. Blood pressure 136/84 nd HR 60 in clinic today. Reports blood pressure at home occasionally, unable to recall readings.     Appetite good, \"too good\". Following renal diet closely. Limiting sodium intake. Eating meals at home, bringing lunch to work. Drinking 3 large cups coffee " daily. No set exercise routine, getting activities with ADLs. Hope to join health club this next year after his is fully recovered from hernia surgery. Denies alcohol use - quit > 1 year ago. Quit tobacco use 2019. Has resumed working in IT.         Recent Hospitalizations   19-19 s/p TURP  19-19 related to acute kidney injury secondary to bladder outlet obstruction with bilateral hydronephrosis and hydroureter, his initial creatinine was 9.59. Creatinine improved to 4.73 19 without needing dialysis. NTproBNP 45,492. New diagnosis of cardiomyopathy with LVEF 30%.         Social History    , 1 daughter, on disability. Enjoys fishing. Enjoy the outdoors.   Social History     Socioeconomic History     Marital status:      Spouse name: Not on file     Number of children: 1     Years of education: Not on file     Highest education level: Not on file   Occupational History     Comment: tech support (IT)   Social Needs     Financial resource strain: Not on file     Food insecurity:     Worry: Not on file     Inability: Not on file     Transportation needs:     Medical: Not on file     Non-medical: Not on file   Tobacco Use     Smoking status: Former Smoker     Packs/day: 0.50     Years: 30.00     Pack years: 15.00     Types: Cigarettes     Last attempt to quit: 2019     Years since quittin.6     Smokeless tobacco: Never Used     Tobacco comment: 12 cigarettes per day   Substance and Sexual Activity     Alcohol use: Not Currently     Alcohol/week: 0.0 standard drinks     Comment: quit in .      Drug use: Never     Sexual activity: Yes     Partners: Female   Lifestyle     Physical activity:     Days per week: Not on file     Minutes per session: Not on file     Stress: Not on file   Relationships     Social connections:     Talks on phone: Not on file     Gets together: Not on file     Attends Protestant service: Not on file     Active member of club or  "organization: Not on file     Attends meetings of clubs or organizations: Not on file     Relationship status: Not on file     Intimate partner violence:     Fear of current or ex partner: Not on file     Emotionally abused: Not on file     Physically abused: Not on file     Forced sexual activity: Not on file   Other Topics Concern     Parent/sibling w/ CABG, MI or angioplasty before 65F 55M? Not Asked   Social History Narrative     Not on file            Review of Systems:   Skin:  Negative     Eyes:  Positive for glasses  ENT:  Negative    Respiratory:  Negative    Cardiovascular:  Negative    Gastroenterology: Negative    Genitourinary:  Positive for    Musculoskeletal:  Negative    Neurologic:  Negative    Psychiatric:  Negative    Heme/Lymph/Imm:  Negative    Endocrine:  Negative           Physical Exam:   Vitals: /84   Pulse 60   Ht 1.816 m (5' 11.5\")   Wt 108.6 kg (239 lb 6.4 oz)   BMI 32.92 kg/m     Wt Readings from Last 4 Encounters:   01/16/20 108.6 kg (239 lb 6.4 oz)   12/17/19 106.1 kg (234 lb)   12/13/19 106.1 kg (234 lb)   12/11/19 106.1 kg (234 lb)     GEN: well nourished, in no acute distress.  HEENT:  Pupils equal, round. Sclerae nonicteric.   NECK: Supple, no masses appreciated. No JVD appreciated on exam.   C/V:  Regular rate and rhythm, no murmur, rub or gallop.    RESP: Respirations are unlabored. Clear to auscultation bilaterally without wheezing, rales, or rhonchi.  GI: Abdomen soft, nontender.  : milligan catheter in place  EXTREM: No LE edema.  NEURO: Alert and oriented, cooperative.  SKIN: Warm and dry.        Data:   ECHO 10/15/19  Left ventricular systolic function is mildly reduced.  LVEF 46% based on biplane 2D tracing. Mild global hypokinesia of the left  ventricle.  The right ventricle is normal in structure, function and size.  No significant valvular abnormalities.  Mild aortic root dilatation. Max diameter of the visualized portion 4.2 cm.     This study was compared to a " prior TTE from 6/22/2019. Global left ventricular  systolic funtion has improved. The aortic root is stable in size, previously  4.3cm, mildly dilated. The ascending aorta was previously measured at 4.1cm,  however it was measured at 3.7cm (within normal limits) on this present study.    Cardiac Event monitor - still in place    Nuclear stress test 10/3/2019  1.  Myocardial perfusion imaging using single isotope technique  demonstrated partially reversible inferior and basal inferolateral  defect.   The small, reversible basal inferolateral defect suggests mild  ischemia in the circumflex distribution.   The fixed inferior defect is likely due to diaphragmatic attenuation.   2. Gated images demonstrated mildly dilated left ventricle with  borderline Global hypokinesis.  The left ventricular systolic function  is mildly reduced at 40% post stress.  3. Compared to the prior study from na .      LIVER ENZYME RESULTS:  Lab Results   Component Value Date    AST 15 06/21/2019    ALT 38 06/21/2019     CBC RESULTS:  Lab Results   Component Value Date    WBC 8.2 10/31/2019    RBC 3.94 (L) 10/31/2019    HGB 12.1 (L) 11/22/2019    HCT 37.7 (L) 10/31/2019    MCV 96 10/31/2019    MCH 31.2 10/31/2019    MCHC 32.6 10/31/2019    RDW 13.8 10/31/2019     10/31/2019     BMP RESULTS:  Lab Results   Component Value Date     01/16/2020    POTASSIUM 5.8 (H) 01/16/2020    CHLORIDE 110 (H) 01/16/2020    CO2 21 (L) 01/16/2020    ANIONGAP 14.8 01/16/2020     (H) 01/16/2020    BUN 57 (H) 01/16/2020    CR 3.41 (H) 01/16/2020    GFRESTIMATED 18 (L) 01/16/2020    GFRESTBLACK 22 (L) 01/16/2020    ELICIA 9.2 01/16/2020             Medications     Current Outpatient Medications   Medication Sig Dispense Refill     acetaminophen (TYLENOL) 325 MG tablet Take 2 tablets (650 mg) by mouth every 4 hours as needed for other (mild pain) 100 tablet 0     carvedilol (COREG) 12.5 MG tablet Take 1 tablet (12.5 mg) by mouth 2 times daily (with  meals) 180 tablet 3     Ferrous Gluconate 240 (27 Fe) MG TABS Take 1 tablet by mouth daily        hydrALAZINE (APRESOLINE) 100 MG tablet Take 1 tablet (100 mg) by mouth 3 times daily 270 tablet 1     HYDROcodone-acetaminophen (NORCO) 5-325 MG tablet Take 1-2 tablets by mouth every 4 hours as needed for moderate to severe pain 10 tablet 0     isosorbide mononitrate (IMDUR) 30 MG 24 hr tablet Take 1 tablet (30 mg) by mouth daily 90 tablet 3     magnesium oxide (MAG-OX) 400 MG tablet Take 1 tablet (400 mg) by mouth 2 times daily 60 tablet 0          Past Medical History     Past Medical History:   Diagnosis Date     Anemia, unspecified type 7/2/2019     Benign essential hypertension 11/5/2019     Bladder outflow obstruction 7/2/2019     Cardiomyopathy, unspecified type (H) 7/2/2019     CKD (chronic kidney disease) stage 4, GFR 15-29 ml/min (H) 7/2/2019     Hyperlipidemia LDL goal <70 8/25/2019     Tobacco abuse      Past Surgical History:   Procedure Laterality Date     HERNIORRHAPHY INGUINAL Left 12/19/2019    Procedure: OPEN REPAIR LEFT INGUINAL HERNIA WITH MESH;  Surgeon: Rao Schmidt MD;  Location:  OR     LASER KTP GREEN LIGHT PHOTOSELECTIVE VAPORIZATION PROSTATE N/A 11/21/2019    Procedure: GREEN LIGHT LASER VAPORIZATION OF THE PROSTATE;  Surgeon: Lencho Germain MD;  Location:  OR     Family History   Problem Relation Age of Onset     Glaucoma Mother      Throat cancer Father      Rheumatoid Arthritis Sister      Alcoholism Brother      Liver Disease Brother             Allergies   Patient has no known allergies.        MAGNOLIA Sheikh [facility]  Pager: [phone]     (3) no apparent problem

## 2020-01-18 NOTE — ED ADULT NURSE NOTE - NSIMPLEMENTINTERV_GEN_ALL_ED
Implemented All Universal Safety Interventions:  [place] to call system. Call bell, personal items and telephone within reach. Instruct patient to call for assistance. Room bathroom lighting operational. Non-slip footwear when patient is off stretcher. Physically safe environment: no spills, clutter or unnecessary equipment. Stretcher in lowest position, wheels locked, appropriate side rails in place.
,DirectAddress_Unknown

## 2020-11-17 PROBLEM — Z85.3 HISTORY OF BREAST CANCER: Status: ACTIVE | Noted: 2020-11-17

## 2020-11-18 ENCOUNTER — APPOINTMENT (OUTPATIENT)
Dept: SURGICAL ONCOLOGY | Facility: CLINIC | Age: 80
End: 2020-11-18
Payer: MEDICARE

## 2020-11-18 VITALS
OXYGEN SATURATION: 92 % | RESPIRATION RATE: 16 BRPM | WEIGHT: 156 LBS | SYSTOLIC BLOOD PRESSURE: 175 MMHG | HEIGHT: 66 IN | BODY MASS INDEX: 25.07 KG/M2 | TEMPERATURE: 98.1 F | DIASTOLIC BLOOD PRESSURE: 76 MMHG | HEART RATE: 90 BPM

## 2020-11-18 DIAGNOSIS — Z85.3 PERSONAL HISTORY OF MALIGNANT NEOPLASM OF BREAST: ICD-10-CM

## 2020-11-18 PROCEDURE — 99214 OFFICE O/P EST MOD 30 MIN: CPT

## 2020-11-18 NOTE — ADDENDUM
[FreeTextEntry1] : I, Frances Ortega, acted solely as a scribe for Dr. Landon Maria on this date 11/18/2020

## 2020-11-18 NOTE — HISTORY OF PRESENT ILLNESS
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 History also includes B/L benign breast biopsies. \par \par She underwent right mammogram/sonogram in 2016 with no evidence of malignancy BI-RADS 2. \par No recent breast imaging. \par \par Denies any palpable masses, nipple discharge, skin changes or inversion to right breast.\par \par PERTINENT HISTORY:\par PMH includes HTN, DM with neuropathy, nephropathy, retinopathy and neuropathy. She is currently on dialysis on Monday, Wednesday and Friday via left upper arm AV Fistula. \par Denies family history of breast or ovarian cancer. \par Denies past or current use of HRT.  \par Menarche age: 13\par LMP: unknown

## 2020-11-18 NOTE — ASSESSMENT
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 \par \par \par Plan:\par Right MMG/US (script in computer)\par Continue yearly surveillance\par \par \par

## 2020-11-18 NOTE — PHYSICAL EXAM
[Normal] : supple, no neck mass and thyroid not enlarged [Normal Supraclavicular Lymph Nodes] : normal supraclavicular lymph nodes [Normal Axillary Lymph Nodes] : normal axillary lymph nodes [Normal] : oriented to person, place and time, with appropriate affect [FreeTextEntry1] : \par \par \par \par  [de-identified] : Normal S1, S2. Regular rate and rhythm\par \par  [de-identified] : RT breast exam is normal. On LT chest wall no lumps and no axillary lymph nodes. no reoccurrence present w/ slight discharge in right chest wall.  [de-identified] : Clear breath sounds bilaterally, normal respiratory effort\par \par

## 2021-12-08 NOTE — PATIENT PROFILE ADULT - NSPROHMDIABETHBA1C_GEN_A_NUR

## 2022-04-01 NOTE — PHYSICAL THERAPY INITIAL EVALUATION ADULT - ASSISTIVE DEVICE FOR TRANSFER: GAIT, REHAB EVAL
From: Erica Mcmillan  To: Kristyn Bryant  Sent: 3/31/2022 7:55 PM CDT  Subject: Covid 19 Booster    Hi,    Just looking for your recommendation on getting or not getting the second Covid-19 booster shot.     Erica   quad cane

## 2023-06-19 ENCOUNTER — INPATIENT (INPATIENT)
Facility: HOSPITAL | Age: 83
LOS: 2 days | Discharge: HOME | DRG: 640 | End: 2023-06-22
Attending: HOSPITALIST | Admitting: STUDENT IN AN ORGANIZED HEALTH CARE EDUCATION/TRAINING PROGRAM
Payer: MEDICARE

## 2023-06-19 VITALS
HEART RATE: 56 BPM | OXYGEN SATURATION: 100 % | DIASTOLIC BLOOD PRESSURE: 62 MMHG | SYSTOLIC BLOOD PRESSURE: 133 MMHG | RESPIRATION RATE: 20 BRPM | TEMPERATURE: 98 F | WEIGHT: 149.91 LBS

## 2023-06-19 DIAGNOSIS — Z41.9 ENCOUNTER FOR PROCEDURE FOR PURPOSES OTHER THAN REMEDYING HEALTH STATE, UNSPECIFIED: Chronic | ICD-10-CM

## 2023-06-19 DIAGNOSIS — R41.82 ALTERED MENTAL STATUS, UNSPECIFIED: ICD-10-CM

## 2023-06-19 LAB
ALBUMIN SERPL ELPH-MCNC: 1.6 G/DL — LOW (ref 3.3–5)
ALP SERPL-CCNC: 97 U/L — SIGNIFICANT CHANGE UP (ref 40–120)
ALT FLD-CCNC: 22 U/L — SIGNIFICANT CHANGE UP (ref 10–45)
ANION GAP SERPL CALC-SCNC: 9 MMOL/L — SIGNIFICANT CHANGE UP (ref 5–17)
AST SERPL-CCNC: 22 U/L — SIGNIFICANT CHANGE UP (ref 10–40)
BASOPHILS # BLD AUTO: 0.02 K/UL — SIGNIFICANT CHANGE UP (ref 0–0.2)
BASOPHILS NFR BLD AUTO: 0.6 % — SIGNIFICANT CHANGE UP (ref 0–2)
BILIRUB SERPL-MCNC: 0.5 MG/DL — SIGNIFICANT CHANGE UP (ref 0.2–1.2)
BUN SERPL-MCNC: 13 MG/DL — SIGNIFICANT CHANGE UP (ref 7–23)
CALCIUM SERPL-MCNC: 9.2 MG/DL — SIGNIFICANT CHANGE UP (ref 8.4–10.5)
CHLORIDE SERPL-SCNC: 102 MMOL/L — SIGNIFICANT CHANGE UP (ref 96–108)
CO2 SERPL-SCNC: 28 MMOL/L — SIGNIFICANT CHANGE UP (ref 22–31)
CREAT SERPL-MCNC: 2.46 MG/DL — HIGH (ref 0.5–1.3)
EGFR: 19 ML/MIN/1.73M2 — LOW
EOSINOPHIL # BLD AUTO: 0 K/UL — SIGNIFICANT CHANGE UP (ref 0–0.5)
EOSINOPHIL NFR BLD AUTO: 0 % — SIGNIFICANT CHANGE UP (ref 0–6)
GLUCOSE SERPL-MCNC: 133 MG/DL — HIGH (ref 70–99)
HCT VFR BLD CALC: 27.2 % — LOW (ref 34.5–45)
HGB BLD-MCNC: 8.5 G/DL — LOW (ref 11.5–15.5)
IMM GRANULOCYTES NFR BLD AUTO: 0.3 % — SIGNIFICANT CHANGE UP (ref 0–0.9)
INR BLD: 1.4 RATIO — HIGH (ref 0.88–1.16)
LACTATE SERPL-SCNC: 0.8 MMOL/L — SIGNIFICANT CHANGE UP (ref 0.7–2)
LYMPHOCYTES # BLD AUTO: 0.21 K/UL — LOW (ref 1–3.3)
LYMPHOCYTES # BLD AUTO: 6.3 % — LOW (ref 13–44)
MCHC RBC-ENTMCNC: 31.3 GM/DL — LOW (ref 32–36)
MCHC RBC-ENTMCNC: 35 PG — HIGH (ref 27–34)
MCV RBC AUTO: 111.9 FL — HIGH (ref 80–100)
MONOCYTES # BLD AUTO: 0.18 K/UL — SIGNIFICANT CHANGE UP (ref 0–0.9)
MONOCYTES NFR BLD AUTO: 5.4 % — SIGNIFICANT CHANGE UP (ref 2–14)
NEUTROPHILS # BLD AUTO: 2.92 K/UL — SIGNIFICANT CHANGE UP (ref 1.8–7.4)
NEUTROPHILS NFR BLD AUTO: 87.4 % — HIGH (ref 43–77)
NRBC # BLD: 3 /100 WBCS — HIGH (ref 0–0)
PLATELET # BLD AUTO: 68 K/UL — LOW (ref 150–400)
POTASSIUM SERPL-MCNC: 3.5 MMOL/L — SIGNIFICANT CHANGE UP (ref 3.5–5.3)
POTASSIUM SERPL-SCNC: 3.5 MMOL/L — SIGNIFICANT CHANGE UP (ref 3.5–5.3)
PROT SERPL-MCNC: 5.5 G/DL — LOW (ref 6–8.3)
PROTHROM AB SERPL-ACNC: 16.3 SEC — HIGH (ref 10.5–13.4)
RBC # BLD: 2.43 M/UL — LOW (ref 3.8–5.2)
RBC # FLD: 16.2 % — HIGH (ref 10.3–14.5)
SODIUM SERPL-SCNC: 139 MMOL/L — SIGNIFICANT CHANGE UP (ref 135–145)
TROPONIN I, HIGH SENSITIVITY RESULT: 36.4 NG/L — SIGNIFICANT CHANGE UP
WBC # BLD: 3.34 K/UL — LOW (ref 3.8–10.5)
WBC # FLD AUTO: 3.34 K/UL — LOW (ref 3.8–10.5)

## 2023-06-19 PROCEDURE — 99223 1ST HOSP IP/OBS HIGH 75: CPT

## 2023-06-19 PROCEDURE — 71045 X-RAY EXAM CHEST 1 VIEW: CPT | Mod: 26

## 2023-06-19 PROCEDURE — 99285 EMERGENCY DEPT VISIT HI MDM: CPT

## 2023-06-19 RX ORDER — ONDANSETRON 8 MG/1
4 TABLET, FILM COATED ORAL EVERY 8 HOURS
Refills: 0 | Status: DISCONTINUED | OUTPATIENT
Start: 2023-06-19 | End: 2023-06-22

## 2023-06-19 RX ORDER — HEPARIN SODIUM 5000 [USP'U]/ML
5000 INJECTION INTRAVENOUS; SUBCUTANEOUS EVERY 12 HOURS
Refills: 0 | Status: DISCONTINUED | OUTPATIENT
Start: 2023-06-19 | End: 2023-06-21

## 2023-06-19 RX ORDER — ERYTHROPOIETIN 10000 [IU]/ML
10000 INJECTION, SOLUTION INTRAVENOUS; SUBCUTANEOUS
Refills: 0 | Status: DISCONTINUED | OUTPATIENT
Start: 2023-06-20 | End: 2023-06-22

## 2023-06-19 RX ORDER — SODIUM CHLORIDE 9 MG/ML
500 INJECTION INTRAMUSCULAR; INTRAVENOUS; SUBCUTANEOUS ONCE
Refills: 0 | Status: COMPLETED | OUTPATIENT
Start: 2023-06-19 | End: 2023-06-19

## 2023-06-19 RX ORDER — ACETAMINOPHEN 500 MG
650 TABLET ORAL EVERY 6 HOURS
Refills: 0 | Status: DISCONTINUED | OUTPATIENT
Start: 2023-06-19 | End: 2023-06-22

## 2023-06-19 RX ORDER — CEFEPIME 1 G/1
1000 INJECTION, POWDER, FOR SOLUTION INTRAMUSCULAR; INTRAVENOUS ONCE
Refills: 0 | Status: COMPLETED | OUTPATIENT
Start: 2023-06-19 | End: 2023-06-19

## 2023-06-19 RX ORDER — LEVETIRACETAM 250 MG/1
250 TABLET, FILM COATED ORAL EVERY 12 HOURS
Refills: 0 | Status: DISCONTINUED | OUTPATIENT
Start: 2023-06-19 | End: 2023-06-22

## 2023-06-19 RX ORDER — VALPROIC ACID (AS SODIUM SALT) 250 MG/5ML
500 SOLUTION, ORAL ORAL
Refills: 0 | Status: DISCONTINUED | OUTPATIENT
Start: 2023-06-19 | End: 2023-06-22

## 2023-06-19 RX ORDER — VANCOMYCIN HCL 1 G
1000 VIAL (EA) INTRAVENOUS ONCE
Refills: 0 | Status: COMPLETED | OUTPATIENT
Start: 2023-06-19 | End: 2023-06-19

## 2023-06-19 RX ORDER — LANOLIN ALCOHOL/MO/W.PET/CERES
3 CREAM (GRAM) TOPICAL AT BEDTIME
Refills: 0 | Status: DISCONTINUED | OUTPATIENT
Start: 2023-06-19 | End: 2023-06-22

## 2023-06-19 RX ADMIN — Medication 250 MILLIGRAM(S): at 18:27

## 2023-06-19 RX ADMIN — SODIUM CHLORIDE 1000 MILLILITER(S): 9 INJECTION INTRAMUSCULAR; INTRAVENOUS; SUBCUTANEOUS at 17:50

## 2023-06-19 RX ADMIN — CEFEPIME 100 MILLIGRAM(S): 1 INJECTION, POWDER, FOR SOLUTION INTRAMUSCULAR; INTRAVENOUS at 17:49

## 2023-06-19 NOTE — ED ADULT NURSE REASSESSMENT NOTE - NS ED NURSE REASSESS COMMENT FT1
pt has stage 2 pressure injury to sacrum. Wound dressing CDI from [place]. Pt has pressure injury to L heel.

## 2023-06-19 NOTE — ED PROVIDER NOTE - PLAN OF CARE
This is a an 82-year-old female presenting for hypotension and altered mental status.  BP is improved on arrival to the ED, patient is DNR/DNI.  Plan for head CT, broad labs to assess for evidence of infection, anemia, electrolyte derangements.  Anticipate admission, likely palliative consult.

## 2023-06-19 NOTE — ED PROVIDER NOTE - OBJECTIVE STATEMENT
82-year-old female past medical history of ESRD on dialysis brought in by EMS for altered mental status.  She is normally ANO by 3, but has been somnolent for the past several days.  She was due for dialysis today but did not go.  Normal fingerstick in the field.  Initially hypotensive per EMS but normotensive on arrival to the ED.  Patient unable to contribute meaningfully to the history.

## 2023-06-19 NOTE — PATIENT PROFILE ADULT - FALL HARM RISK - FACTORS
Pt 89 year old woman admitted 11/29/17 for decreased responsiveness, fever, and L facial swelling. Hx of CKD, hypothyroidism, supraglottic edema, breast cancer, and UTI. Laryngeal edema Laryngeal edema parotitis pt came in with parotitis Impaired gait/Weakness/Other

## 2023-06-19 NOTE — PATIENT PROFILE ADULT - FALL HARM RISK - HARM RISK INTERVENTIONS

## 2023-06-19 NOTE — PATIENT PROFILE ADULT - FUNCTIONAL ASSESSMENT - DAILY ACTIVITY 4.
Patient:   MYLES DONALDSON            MRN: [id number]            FIN: [id number]               Age:   34 years     Sex:  FEMALE     :  87   Associated Diagnoses:   None   Author:   EDGAR HARTMAN      H&P UPDATE::    I HAVE REVEIWED THE HISTORY & PHYSICAL (H&P COMPLETED IN THE LAST 30 DAYS) AND EXAMINED THE PATIENT:    ____X_   NO CHANGES HAVE OCCURRED IN THE PATIENT'S CONDITION SINCE THE H&P WAS COMPLETED       _____ THE FOLLOWING ARE THE CHANGES IN THE PATIENT'S CONDITION (SPECIFIED BELOW):                   Electronically Signed On 2018 07:22  __________________________________________________   EDGAR HARTMAN    
1 = Total assistance

## 2023-06-19 NOTE — PATIENT PROFILE ADULT - FUNCTIONAL ASSESSMENT - BASIC MOBILITY 6.
1-calculated by average/Not able to assess (calculate score using [facility] averaging method)

## 2023-06-19 NOTE — H&P ADULT - ASSESSMENT
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         DVT Prophylaxis:  - Heparin    IMPROVE VTE Individual Risk Assessment          RISK                                                          Points  [  ] Previous VTE                                                3  [  ] Thrombophilia                                             2  [ X ] Lower limb paralysis                                   2        (unable to hold up >15 seconds)    [  ] Current Cancer                                             2         (within 6 months)  [ X ] Immobilization > 24 hrs                              1  [  ] ICU/CCU stay > 24 hours                             1  [ X ] Age > 60                                                         1    IMPROVE VTE Score:         [    4     ] 82 year old F with PMH of T2DM, HTN, PAD s/p atherectomy and LLE 5th digit OM s/p amputation, porphyria cutanea tarda, pA-fib (Eliquis, also hx of HIB), CAD s/p stent, MV endocarditis, Hep C, Hep B, Paget’s disease of breast s/p mastectomy, ESRD on HD (failed AVF, now using permacath), 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     Altered mental status - unclear etiology, no source of infection; s/p Vanc, Cefepime in ER. Will hold off on additional abx at this time. Follow cultures Patient has been declining over last 4-6 months with worsening dysphagia, was on pureed diet now essentially NPO. SLP eval. NPO for now  FTT - will consider Palliative Care eval. Daughter considering hospice given deterioration   A-fib - hx of GIB, daughter prefers to hold AC at this time  ESRD on HD - Nephro consult. HD tomorrow   Leukopenia, Thromocytopenia - monitor. labs drawn from IV line which may not be accurate    DVT Prophylaxis:  - Heparin    Updated daughter at bedside    IMPROVE VTE Individual Risk Assessment          RISK                                                          Points  [  ] Previous VTE                                                3  [  ] Thrombophilia                                             2  [ X ] Lower limb paralysis                                   2        (unable to hold up >15 seconds)    [  ] Current Cancer                                             2         (within 6 months)  [ X ] Immobilization > 24 hrs                              1  [  ] ICU/CCU stay > 24 hours                             1  [ X ] Age > 60                                                         1    IMPROVE VTE Score:         [    4     ]

## 2023-06-19 NOTE — ED ADULT NURSE NOTE - NSFALLRISKINTERV_ED_ALL_ED
Assistance OOB with selected safe patient handling equipment if applicable/Assistance with ambulation/Communicate fall risk and risk factors to all staff, patient, and family/Monitor gait and stability/Monitor for mental status changes and reorient to person, place, and time, as needed/Provide visual cue: yellow wristband, yellow gown, etc/Reinforce activity limits and safety measures with patient and family/Toileting schedule using arm’s reach rule for commode and bathroom/Use of alarms - bed, stretcher, chair and/or video monitoring/Call bell, personal items and telephone in reach/Instruct patient to call for assistance before getting out of bed/chair/stretcher/Non-slip footwear applied when patient is off stretcher/[place] to call system/Physically safe environment - no spills, clutter or unnecessary equipment/Purposeful Proactive Rounding/Room/bathroom lighting operational, light cord in reach

## 2023-06-19 NOTE — H&P ADULT - NSHPPHYSICALEXAM_GEN_ALL_CORE
T(C): 36.7 (06-19-23 @ 12:48), Max: 36.7 (06-19-23 @ 12:48)  HR: 56 (06-19-23 @ 12:48) (56 - 56)  BP: 133/62 (06-19-23 @ 12:48) (133/62 - 133/62)  RR: 20 (06-19-23 @ 12:48) (20 - 20)  SpO2: 100% (06-19-23 @ 12:48) (100% - 100%)  Wt(kg): --Vital Signs Last 24 Hrs    Parameters below as of 19 Jun 2023 12:48  Patient On (Oxygen Delivery Method): nasal cannula  O2 Flow (L/min): 6    PHYSICAL EXAM:  GENERAL: NAD elderly chronically ill appearing F  NERVOUS SYSTEM:  Alert & Oriented X1. chronic jasmyn-paresis and facial asymmetry   HEAD:  Atraumatic, Normocephalic  EYES: EOMI, PERRLA, conjunctiva and sclera clear  ENMT: No tonsillar erythema, exudates, or enlargement; Moist mucous membranes, Good dentition, No lesions  NECK: Supple, No JVD, Normal thyroid  CHEST/LUNG: Clear to percussion bilaterally; No rales, rhonchi, wheezing, or rubs. +HD catheter over right upper chest wall c/d/i  HEART: Regular rate and rhythm; No murmurs, rubs, or gallops  ABDOMEN: Soft, Nontender, Nondistended; Bowel sounds present  EXTREMITIES: no edema. hyperpigmentation of b/l LE  LYMPH: No lymphadenopathy noted  SKIN: No rashes or lesions

## 2023-06-19 NOTE — ED PROVIDER NOTE - CLINICAL SUMMARY MEDICAL DECISION MAKING FREE TEXT BOX
No acute pathology seen on head CT.  CBC shows pancytopenia, unknown baseline.  With leukopenia and respiratory rate of 20, she meets SIRS criteria.  Vanc + cefepime have been ordered.  I opted to give a judicious fluid bolus of 500 cc given her renal history, she appears to have responded well to the fluids.  Rest of her labs are notable for creatinine of 2.46, unknown baseline.  INR 1.4, albumin 1.6 likely secondary to longstanding renal disease.  I discussed these results with the family, they understand that she is severely ill and likely at the end of her life.  They confirm she is DNR/DNI and do not want any aggressive interventions done.  I discussed the case with the hospitalist, patient's been accepted for admission to the floor.

## 2023-06-19 NOTE — ED PROVIDER NOTE - CARE PLAN
Principal Discharge DX:	Altered mental status  Assessment and plan of treatment:	This is a an 82-year-old female presenting for hypotension and altered mental status.  BP is improved on arrival to the ED, patient is DNR/DNI.  Plan for head CT, broad labs to assess for evidence of infection, anemia, electrolyte derangements.  Anticipate admission, likely palliative consult.   1

## 2023-06-19 NOTE — H&P ADULT - HISTORY OF PRESENT ILLNESS
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     Patient unable to provide meaningful history at time of evaluation. Patient usually oriented x2-3. Has had declining health over the last 4-6 months with multiple admissions to [facility]. 2 months ago had failure of AVF, permacath placed. Over the last 2-3 weeks patient has been intermittently refusing medications and less interactive. Was able to tolerate pureed diet previously, now minimal intake for the last 2 days.   This morning she was more confused, BP noted to be low and EMS activated.   Of note, patient had planned vascular intervention to repair AVF, has been receiving Vancomycin with HD in preparation for procedure per Vascular's recommendation. Daughter at bedside does not wish for more interventions.      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     Patient unable to provide meaningful history at time of evaluation. Patient usually oriented x2-3. Has had declining health over the last 4-6 months with multiple admissions to [facility]. 2 months ago had failure of AVF, permacath placed. Over the last 2-3 weeks patient has been intermittently refusing medications and less interactive. Was able to tolerate pureed diet previously, now minimal intake for the last 2 days.   This morning she was more confused, BP noted to be low and EMS activated.   Of note, patient had planned vascular intervention to repair AVF, has been receiving Vancomycin with HD in preparation for procedure per Vascular's recommendation. Daughter at bedside does not wish for more interventions.     On ER arrival, T 98, /62, HR 56, RR 20, sat 100% on 6L NC. 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     Patient unable to provide meaningful history at time of evaluation. Patient usually oriented x2-3. Has had declining health over the last 4-6 months with multiple admissions to [facility]. 2 months ago had failure of AVF, permacath placed. Over the last 2-3 weeks patient has been intermittently refusing medications and less interactive. Was able to tolerate pureed diet previously, now minimal intake for the last 2 days.   This morning she was more confused, BP noted to be low and EMS activated.   Of note, patient had planned vascular intervention to repair AVF, has been receiving Vancomycin with HD in preparation for procedure per Vascular's recommendation. Daughter at bedside does not wish for more interventions.     On ER arrival, T 98, /62, HR 56, RR 20, sat 100% on 6L NC. Given 500cc NS, Vanc and Cefepime in ER

## 2023-06-19 NOTE — ED PROVIDER NOTE - PHYSICAL EXAMINATION
Gen: Chronically ill-appearing  HEENT: Normocephalic, Atraumatic. PERRL, EOMI.  Neck: Supple, FROM  CV: Bradycardia in the 50s, +S1/S2, No M/R/G  Pulm: Normal WOB. RR WNL. No wheeze, rhonchi, rales. No accessory muscle use.  Abd: S NT ND.  MSK: Moving all 4, no evidence of trauma  Skin: Normal color, temp, turgor. No rashes appreciated.  Neuro: Responsive to painful stimuli.  Makes purposeful movements with no apparent focal deficits.

## 2023-06-19 NOTE — H&P ADULT - NSHPLABSRESULTS_GEN_ALL_CORE
LABS:                        8.5    3.34  )-----------( 68       ( 19 Jun 2023 14:50 )             27.2     06-19    139  |  102  |  13  ----------------------------<  133<H>  3.5   |  28  |  2.46<H>    Ca    9.2      19 Jun 2023 14:50    TPro  5.5<L>  /  Alb  1.6<L>  /  TBili  0.5  /  DBili  x   /  AST  22  /  ALT  22  /  AlkPhos  97  06-19    PT/INR - ( 19 Jun 2023 14:50 )   PT: 16.3 sec;   INR: 1.40 ratio              CAPILLARY BLOOD GLUCOSE                RADIOLOGY & ADDITIONAL TESTS:    Consultant(s) Notes Reviewed:  [x ] YES  [ ] NO  Care Discussed with Consultants/Other Providers [ x] YES  [ ] NO  Imaging Personally Reviewed:  [ ] YES  [ ] NO LABS:                        8.5    3.34  )-----------( 68       ( 19 Jun 2023 14:50 )             27.2     06-19    139  |  102  |  13  ----------------------------<  133<H>  3.5   |  28  |  2.46<H>    Ca    9.2      19 Jun 2023 14:50    TPro  5.5<L>  /  Alb  1.6<L>  /  TBili  0.5  /  DBili  x   /  AST  22  /  ALT  22  /  AlkPhos  97  06-19    PT/INR - ( 19 Jun 2023 14:50 )   PT: 16.3 sec;   INR: 1.40 ratio      CAPILLARY BLOOD GLUCOSE                RADIOLOGY & ADDITIONAL TESTS:    Consultant(s) Notes Reviewed:  [x ] YES  [ ] NO  Care Discussed with Consultants/Other Providers [ x] YES  [ ] NO d/w Dr. Johnson in ER  Imaging Personally Reviewed:  [ ] YES  [ ] NO  Chest X-Ray (personally reviewed by me): Portable film, fair inspiratory effort. no acute pulm disease LABS:                        8.5    3.34  )-----------( 68       ( 19 Jun 2023 14:50 )             27.2     06-19    139  |  102  |  13  ----------------------------<  133<H>  3.5   |  28  |  2.46<H>    Ca    9.2      19 Jun 2023 14:50    TPro  5.5<L>  /  Alb  1.6<L>  /  TBili  0.5  /  DBili  x   /  AST  22  /  ALT  22  /  AlkPhos  97  06-19    PT/INR - ( 19 Jun 2023 14:50 )   PT: 16.3 sec;   INR: 1.40 ratio      CAPILLARY BLOOD GLUCOSE    RADIOLOGY & ADDITIONAL TESTS:    Consultant(s) Notes Reviewed:  [x ] YES  [ ] NO  Care Discussed with Consultants/Other Providers [ x] YES  [ ] NO d/w Dr. Johnson in ER  Imaging Personally Reviewed:  [ ] YES  [ ] NO  Chest X-Ray (personally reviewed by me): Portable film, fair inspiratory effort. no acute pulm disease

## 2023-06-19 NOTE — ED ADULT TRIAGE NOTE - CHIEF COMPLAINT QUOTE
Pt. BIBA for AMS.  As per EMS pt. is normally A&)x 3 but as of the last few days A&0x1.  Pt. responsive to pain at this time.  Pt. last dialysis on Friday, did not have any today.  Fingerstick for .  Initial BP for EMS 46/35, pt. normotensive now.

## 2023-06-19 NOTE — H&P ADULT - CONVERSATION DETAILS
DNR/DNI    Daughter who is HCP states she is not interested in any invasive tests. Would consider hospice if she fails SLP eval.

## 2023-06-20 LAB
ALBUMIN SERPL ELPH-MCNC: 1.5 G/DL — LOW (ref 3.3–5)
ALP SERPL-CCNC: 95 U/L — SIGNIFICANT CHANGE UP (ref 40–120)
ALT FLD-CCNC: 22 U/L — SIGNIFICANT CHANGE UP (ref 10–45)
ANION GAP SERPL CALC-SCNC: 9 MMOL/L — SIGNIFICANT CHANGE UP (ref 5–17)
AST SERPL-CCNC: 22 U/L — SIGNIFICANT CHANGE UP (ref 10–40)
BILIRUB SERPL-MCNC: 0.4 MG/DL — SIGNIFICANT CHANGE UP (ref 0.2–1.2)
BUN SERPL-MCNC: 15 MG/DL — SIGNIFICANT CHANGE UP (ref 7–23)
CALCIUM SERPL-MCNC: 9.2 MG/DL — SIGNIFICANT CHANGE UP (ref 8.4–10.5)
CHLORIDE SERPL-SCNC: 104 MMOL/L — SIGNIFICANT CHANGE UP (ref 96–108)
CO2 SERPL-SCNC: 26 MMOL/L — SIGNIFICANT CHANGE UP (ref 22–31)
CREAT SERPL-MCNC: 2.6 MG/DL — HIGH (ref 0.5–1.3)
EGFR: 18 ML/MIN/1.73M2 — LOW
GLUCOSE SERPL-MCNC: 134 MG/DL — HIGH (ref 70–99)
HAV IGM SER-ACNC: SIGNIFICANT CHANGE UP
HBV CORE IGM SER-ACNC: SIGNIFICANT CHANGE UP
HBV SURFACE AB SER-ACNC: REACTIVE
HBV SURFACE AG SER-ACNC: SIGNIFICANT CHANGE UP
HCT VFR BLD CALC: 25.7 % — LOW (ref 34.5–45)
HCV AB S/CO SERPL IA: 9.18 S/CO — HIGH (ref 0–0.99)
HCV AB SERPL-IMP: REACTIVE
HGB BLD-MCNC: 8 G/DL — LOW (ref 11.5–15.5)
MCHC RBC-ENTMCNC: 31.1 GM/DL — LOW (ref 32–36)
MCHC RBC-ENTMCNC: 34.5 PG — HIGH (ref 27–34)
MCV RBC AUTO: 110.8 FL — HIGH (ref 80–100)
NRBC # BLD: 2 /100 WBCS — HIGH (ref 0–0)
PLATELET # BLD AUTO: 56 K/UL — LOW (ref 150–400)
POTASSIUM SERPL-MCNC: 3.4 MMOL/L — LOW (ref 3.5–5.3)
POTASSIUM SERPL-SCNC: 3.4 MMOL/L — LOW (ref 3.5–5.3)
PROCALCITONIN SERPL-MCNC: 0.51 NG/ML — HIGH
PROT SERPL-MCNC: 5.2 G/DL — LOW (ref 6–8.3)
RBC # BLD: 2.32 M/UL — LOW (ref 3.8–5.2)
RBC # FLD: 15.9 % — HIGH (ref 10.3–14.5)
SODIUM SERPL-SCNC: 139 MMOL/L — SIGNIFICANT CHANGE UP (ref 135–145)
WBC # BLD: 3.04 K/UL — LOW (ref 3.8–10.5)
WBC # FLD AUTO: 3.04 K/UL — LOW (ref 3.8–10.5)

## 2023-06-20 PROCEDURE — 99233 SBSQ HOSP IP/OBS HIGH 50: CPT

## 2023-06-20 RX ORDER — SODIUM CHLORIDE 9 MG/ML
1000 INJECTION, SOLUTION INTRAVENOUS
Refills: 0 | Status: DISCONTINUED | OUTPATIENT
Start: 2023-06-20 | End: 2023-06-21

## 2023-06-20 RX ORDER — SODIUM CHLORIDE 9 MG/ML
1000 INJECTION, SOLUTION INTRAVENOUS
Refills: 0 | Status: DISCONTINUED | OUTPATIENT
Start: 2023-06-20 | End: 2023-06-20

## 2023-06-20 RX ORDER — SODIUM CHLORIDE 9 MG/ML
300 INJECTION INTRAMUSCULAR; INTRAVENOUS; SUBCUTANEOUS ONCE
Refills: 0 | Status: COMPLETED | OUTPATIENT
Start: 2023-06-20 | End: 2023-06-20

## 2023-06-20 RX ORDER — MORPHINE SULFATE 50 MG/1
1 CAPSULE, EXTENDED RELEASE ORAL
Refills: 0 | Status: DISCONTINUED | OUTPATIENT
Start: 2023-06-20 | End: 2023-06-21

## 2023-06-20 RX ADMIN — HEPARIN SODIUM 5000 UNIT(S): 5000 INJECTION INTRAVENOUS; SUBCUTANEOUS at 05:09

## 2023-06-20 RX ADMIN — ERYTHROPOIETIN 10000 UNIT(S): 10000 INJECTION, SOLUTION INTRAVENOUS; SUBCUTANEOUS at 11:09

## 2023-06-20 RX ADMIN — LEVETIRACETAM 400 MILLIGRAM(S): 250 TABLET, FILM COATED ORAL at 05:09

## 2023-06-20 RX ADMIN — Medication 55 MILLIGRAM(S): at 22:10

## 2023-06-20 RX ADMIN — SODIUM CHLORIDE 40 MILLILITER(S): 9 INJECTION, SOLUTION INTRAVENOUS at 22:10

## 2023-06-20 RX ADMIN — LEVETIRACETAM 400 MILLIGRAM(S): 250 TABLET, FILM COATED ORAL at 19:45

## 2023-06-20 RX ADMIN — SODIUM CHLORIDE 3600 MILLILITER(S): 9 INJECTION INTRAMUSCULAR; INTRAVENOUS; SUBCUTANEOUS at 10:38

## 2023-06-20 RX ADMIN — HEPARIN SODIUM 5000 UNIT(S): 5000 INJECTION INTRAVENOUS; SUBCUTANEOUS at 18:45

## 2023-06-20 RX ADMIN — Medication 55 MILLIGRAM(S): at 05:38

## 2023-06-20 NOTE — SWALLOW BEDSIDE ASSESSMENT ADULT - ORAL PREPARATORY PHASE
Refuses to accept bolus into oral cavity/Reduced oral grading/Lateral loss of bolus Refuses to accept bolus into oral cavity

## 2023-06-20 NOTE — DIETITIAN INITIAL EVALUATION ADULT - PERTINENT MEDS FT
MEDICATIONS  (STANDING):  epoetin silvia-epbx (RETACRIT) Injectable 97193 Unit(s) IV Push <User Schedule>  heparin   Injectable 5000 Unit(s) SubCutaneous every 12 hours  levETIRAcetam  IVPB 250 milliGRAM(s) IV Intermittent every 12 hours  valproate sodium  IVPB 500 milliGRAM(s) IV Intermittent two times a day    MEDICATIONS  (PRN):  acetaminophen     Tablet .. 650 milliGRAM(s) Oral every 6 hours PRN Temp greater or equal to 38C (100.4F), Mild Pain (1 - 3)  aluminum hydroxide/magnesium hydroxide/simethicone Suspension 30 milliLiter(s) Oral every 4 hours PRN Dyspepsia  melatonin 3 milliGRAM(s) Oral at bedtime PRN Insomnia  ondansetron Injectable 4 milliGRAM(s) IV Push every 8 hours PRN Nausea and/or Vomiting

## 2023-06-20 NOTE — DIETITIAN INITIAL EVALUATION ADULT - WEIGHT (LBS)
Trudy William    Chief Complaint   Patient presents with    New Patient    Leg Pain     calf pain redness       History and Physical    Mr. Gianfranco Catalan is a self-referral, more at the persuasion of his wife who is a patient of ours. He has had chronic leg pain, which we do know there is a lot related to his spinal disease. But now with a spinal stimulator, it is really been difficult to discern or differentiate leg pain. He has had a general description of pain with walking, relieved with rest.  He has numbness, he has been told he has small vessel disease. He is a diabetic. But he also has days where he will have a fair amount of leg edema, and then has noticed some discoloration, described as either redness or brownish discoloration, mostly in the lower legs. He has had on and off small sores on his legs and feet, which have delayed healing. Though he has not given it as much concern as his wife, they both just have an awareness to want to be sure circulation is okay, as he does have direct family members who have had PAD and limb loss. And with his notable risk factors, they just want reassurance of his perfusion status. I do note there are a couple of venous Dopplers on file, those of which have been negative for DVT and no signs of any type of obvious reflux. He himself also has not had any problems with varicose veins. He does have compression stockings that he can wear, but admits he does not use them regularly.     Past Medical History:   Diagnosis Date    Abdominal adhesions 7/30/2014    Back muscle spasm 7/30/2014    Back pain, lumbosacral     Cellulitis     Constipation, chronic 7/30/2014    DDD (degenerative disc disease), lumbar 7/30/2014    DDD (degenerative disc disease), thoracic 7/30/2014    Depression     Diabetes ([facility] [address]     DM (diabetes mellitus) ([facility] [address] 7/30/2014    Frequent falls 7/30/2014    TRUE (generalized anxiety disorder) 7/30/2014    High cholesterol     HTN (hypertension) 7/30/2014    Hyperlipidemia 7/30/2014    Hypertension     Insomnia secondary to chronic pain 7/30/2014    LBP radiating to both legs 7/30/2014    Lumbar facet arthropathy (HCC) 7/30/2014    Lumbar nerve root impingement 7/30/2014    Lumbosacral radiculopathy at S1 7/30/2014    Major depression 7/30/2014    Nausea & vomiting     Neurological disorder     sciatica    MANISH (obstructive sleep apnea) 7/30/2014    S/P lumbar laminectomy 7/30/2014    S/P lumbar spinal fusion 7/30/2014    Small vessel disease     Thoracic compression fracture ([facility] [address] 7/30/2014    Unspecified sleep apnea     USES CPAP EVERY NIGHT     Patient Active Problem List   Diagnosis Code    DDD (degenerative disc disease), lumbar M51.36    Lumbar facet arthropathy (HCC) M46.96    S/P lumbar laminectomy Z98.890    S/P lumbar spinal fusion Z98.1    Lumbar nerve root impingement M54.16    Lumbosacral radiculopathy at S1 M54.17    DDD (degenerative disc disease), thoracic M51.34    Thoracic compression fracture (HCC) S22.000A    Back muscle spasm M62.830    Major depression F32.9    TRUE (generalized anxiety disorder) F41.1    Abdominal adhesions K66.0    Constipation, chronic K59.09    Insomnia secondary to chronic pain G89.29, G47.01    MANISH (obstructive sleep apnea) G47.33    DM (diabetes mellitus) (HCC) E11.9    HTN (hypertension) I10    Hyperlipidemia E78.5    Frequent falls R29.6    Thoracic or lumbosacral neuritis or radiculitis, unspecified [id number]    Postlaminectomy syndrome, lumbar region M96.1    History of depression Z86.59    Chronic midline low back pain with sciatica M54.40, G89.29    Advanced care planning/counseling discussion Z71.89    Anemia D64.9    Chronic pain syndrome G89.4    Lumbar post-laminectomy syndrome M96.1    Lumbar and sacral osteoarthritis M47.817     Past Surgical History:   Procedure Laterality Date    HX APPENDECTOMY      HX BACK SURGERY      Lumbar fusion    HX HERNIA REPAIR  1992    HX OTHER SURGICAL      EMG - S1 disorder    HX VASECTOMY  1985     Current Outpatient Prescriptions   Medication Sig Dispense Refill    gabapentin (NEURONTIN) 800 mg tablet Take  by mouth three (3) times daily.  metoprolol succinate (TOPROL-XL) 50 mg XL tablet Take 1 Tab by mouth daily. APPOINTMENT REQUIRED BEFORE NEXT REFILL. 90 Tab 1    ondansetron hcl (ZOFRAN) 4 mg tablet Take 1 Tab by mouth every eight (8) hours as needed for Nausea. 12 Tab 0    ACCU-CHEK SMARTVIEW TEST STRIP strip CHECK BLOOD SUGAR EVERY  Strip 3    buPROPion SR (WELLBUTRIN SR) 150 mg SR tablet TAKE 1 TABLET TWICE DAILY 180 Tab 1    isosorbide mononitrate ER (IMDUR) 60 mg CR tablet TAKE 1 TABLET EVERY MORNING 90 Tab 1    Blood-Glucose Meter (ACCU-CHEK GABRIELLE) misc Check blood sugar daily 1 Each 0    simvastatin (ZOCOR) 10 mg tablet TAKE 1 TABLET EVERY NIGHT 90 Tab 1    diazePAM (VALIUM) 5 mg tablet Take 5 mg by mouth every six (6) hours as needed for Anxiety.  zolpidem (AMBIEN) 10 mg tablet Take 1 Tab by mouth nightly. Max Daily Amount: 10 mg. 90 Tab 0    pramipexole (MIRAPEX) 0.5 mg tablet TAKE 1 TABLET THREE TIMES DAILY 270 Tab 1    phentermine (ADIPEX-P) 37.5 mg tablet Take 1 Tab by mouth every morning. Max Daily Amount: 37.5 mg. 30 Tab 2    Lancets (ACCU-CHEK FASTCLIX) misc Check blood sugar daily 1 Each 11    FERROUS SULFATE, DRIED (IRON, DRIED, PO) Take  by mouth.  nitroglycerin (NITROSTAT) 0.4 mg SL tablet 1 Tab by SubLINGual route every five (5) minutes as needed. Use for Chest Pain ; Call MD if > 3 tablets required 1 Bottle 0    Back Brace [facility] 1 Device by Does Not Apply route daily as needed for Pain. One, lumbosacral orthosis/back brace with metal struts      Medically necessary 1 Device 0    multivitamin (ONE A DAY) tablet Take 1 Tab by mouth daily.  morphine CR (MS CONTIN) 15 mg CR tablet Take 1 Tab by mouth every twelve (12) hours for 30 days.  Max Daily Amount: 30 mg. 60 Tab 0    morphine IR (MS IR) 15 mg tablet Take 1 Tab by mouth three (3) times daily as needed for Pain. Max Daily Amount: 45 mg. 90 Tab 0    metFORMIN ER (GLUCOPHAGE XR) 500 mg tablet Take 3 Tabs by mouth daily (with dinner). 270 Tab 3    morphine IR (MS IR) 15 mg tablet Take 1 Tab by mouth three (3) times daily as needed for Pain. Max Daily Amount: 45 mg. 90 Tab 0    morphine IR (MS IR) 15 mg tablet Take 1 Tab by mouth three (3) times daily as needed for Pain. Max Daily Amount: 45 mg. 90 Tab 0    DULoxetine (CYMBALTA) 60 mg capsule Take 1 Cap by mouth daily. 90 Cap 2    busPIRone (BUSPAR) 10 mg tablet Take 1 Tab by mouth two (2) times a day. 60 Tab 6    morphine IR (MS IR) 15 mg tablet Take 1 Tab by mouth four (4) times daily as needed for Pain. Max Daily Amount: 60 mg. 180 Tab 0    OMEPRAZOLE, BULK,       naloxone 4 mg/actuation spry 4 mg by Nasal route as needed. 1 Box 0     Allergies   Allergen Reactions    Opana [Oxymorphone] Other (comments)     Feels like bug crawling under skin and drowziness     Social History     Social History    Marital status:      Spouse name: N/A    Number of children: N/A    Years of education: N/A     Occupational History    Not on file.      Social History Main Topics    Smoking status: Never Smoker    Smokeless tobacco: Never Used    Alcohol use No    Drug use: No    Sexual activity: Yes     Partners: Female     Birth control/ protection: Surgical     Other Topics Concern    Not on file     Social History Narrative      Family History   Problem Relation Age of Onset    Diabetes Mother     Heart Failure Mother     Heart Attack Father     Diabetes Sister     Stroke Brother        Review of Systems    Review of Systems - History obtained from the patient  General ROS: negative  Psychological ROS: negative  Ophthalmic ROS: negative  Respiratory ROS: negative  Cardiovascular ROS: negative  Gastrointestinal ROS: negative  Musculoskeletal ROS: positive for - back problems as noted in HPI  Neurological ROS: positive for - numbness/tingling  Dermatological ROS: per HPI  Vascular ROS: per HPI        Physical Exam:    Visit Vitals    /80 (BP 1 Location: Left arm, BP Patient Position: Sitting)    Pulse 100    Resp 20    Ht 6' 2\" (1.88 m)    Wt 300 lb (136.1 kg)    BMI 38.52 kg/m2      General:  Alert, cooperative, no distress. Head:  Normocephalic, without obvious abnormality, atraumatic. Eyes:    Conjunctivae/corneas clear. Pupils equal, round, reactive to light. Extraocular movements intact. Extremities:  He has no notable edema appreciated today. There are no varicosities   Pulses:  Distal pulses bilaterally did appear multiphasic with hand-held Doppler. Skin:  Patient does have bilateral lower extremity hyperpigmentation, consistent with venous stasis type skin changes. But there is no evidence of cellulitis. He does have scarring of the skin from prior sores. Impression and Plan:  1. PAD (peripheral artery disease) (HCC)    2. Leg pain, bilateral      Orders Placed This Encounter    gabapentin (NEURONTIN) 800 mg tablet     I discussed both venous and arterial disease. He has negative venous studies. Without varicose veins, I would suggest that for just generalized venous insufficiency, which is probably 1 component of his leg issues, that he incorporate a regular routine of compression therapy, leg elevation, a good exercise routine, and a good skin care routine. As a diabetic I have also recommended and encouraged that he try to establish care with a podiatrist for preventative foot care routine. Though exam seemed pretty benign, we will get baseline arterial testing, including ABIs, since he has been told he has small vessel disease. We will follow-up with him afterwards    MOHSEN Reinoso    Portions of this note have been created using voice recognition software. 143.7

## 2023-06-20 NOTE — CONSULT NOTE ADULT - SUBJECTIVE AND OBJECTIVE BOX
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 Patient seen in ED, unable to provide history or ROS. Has had declining health over the last few months with multiple admissions to [facility]. Poor PO intake. Low BP noted. Due for HD today.     ROS:  as above    Allergies:  penicillin (Unknown)  iodine (Unknown)  Venofer (Unknown)  ampicillin (Unknown)    PAST MEDICAL & SURGICAL HISTORY: as above  ESRD on dialysis    SOCIAL HISTORY:  negative    FAMILY HISTORY:  NC    MEDICATIONS  (STANDING):  dextrose 5% + sodium chloride 0.9%. 1000 milliLiter(s) (40 mL/Hr) IV Continuous <Continuous>  epoetin silvia-epbx (RETACRIT) Injectable 35547 Unit(s) IV Push <User Schedule>  heparin   Injectable 5000 Unit(s) SubCutaneous every 12 hours  levETIRAcetam  IVPB 250 milliGRAM(s) IV Intermittent every 12 hours  valproate sodium  IVPB 500 milliGRAM(s) IV Intermittent two times a day    Vital Signs Last 24 Hrs  T(C): 35.2 (06-20-23 @ 12:10), Max: 36.4 (06-20-23 @ 09:30)  T(F): 95.3 (06-20-23 @ 12:10), Max: 97.6 (06-20-23 @ 09:30)  HR: 56 (06-20-23 @ 12:10) (55 - 58)  BP: 94/48 (06-20-23 @ 12:10) (89/29 - 131/82)  BP(mean): 93 (06-19-23 @ 18:46) (93 - 93)  RR: 15 (06-20-23 @ 12:10) (12 - 16)  SpO2: 100% (06-20-23 @ 12:10) (100% - 100%)    s1s2  b/l air entry  soft, ND  tr edema    LABS:                        8.0    3.04  )-----------( 56       ( 20 Jun 2023 06:30 )             25.7     06-20    139  |  104  |  15  ----------------------------<  134<H>  3.4<L>   |  26  |  2.60<H>    Ca    9.2      20 Jun 2023 06:30    TPro  5.2<L>  /  Alb  1.5<L>  /  TBili  0.4  /  DBili  x   /  AST  22  /  ALT  22  /  AlkPhos  95  06-20    LIVER FUNCTIONS - ( 20 Jun 2023 06:30 )  Alb: 1.5 g/dL / Pro: 5.2 g/dL / ALK PHOS: 95 U/L / ALT: 22 U/L / AST: 22 U/L / GGT: x           PT/INR - ( 19 Jun 2023 14:50 )   PT: 16.3 sec;   INR: 1.40 ratio      A/P:    Hx CVA, DM, HTN, ESRD on HD  Pt has been declining last few months, sent from [facility] w/AMS, FTT, pancytopenia   NPO per Sp and Sw recommendations  Gentle IVF while NPO  Daughter, Lenore Del Angel in agreement to continue HD while pt is in the hospital TTS while [facility] d/w family are taking place   F/u CBC, BMP  Prognosis is poor overall    [phone]

## 2023-06-20 NOTE — PROGRESS NOTE ADULT - ASSESSMENT
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     Altered mental status - unclear etiology, no source of infection; s/p Vanc, Cefepime in ER. Will hold off on additional abx at this time. Follow cultures Patient has been declining over last 4-6 months with worsening dysphagia, was on pureed diet now essentially NPO. SLP eval. NPO for now  FTT - will consider Palliative Care eval. Daughter considering hospice given deterioration   A-fib - hx of GIB, daughter prefers to hold AC at this time  ESRD on HD - Nephro consult. HD tomorrow   Leukopenia, Thromocytopenia - monitor. labs drawn from IV line which may not be accurate    DVT Prophylaxis:  - Heparin    Updated daughter at bedside    IMPROVE VTE Individual Risk Assessment          RISK                                                          Points  [  ] Previous VTE                                                3  [  ] Thrombophilia                                             2  [ X ] Lower limb paralysis                                   2        (unable to hold up >15 seconds)    [  ] Current Cancer                                             2         (within 6 months)  [ X ] Immobilization > 24 hrs                              1  [  ] ICU/CCU stay > 24 hours                             1  [ X ] Age > 60                                                         1    IMPROVE VTE Score:         [    4     ] 82 year old F with PMH of T2DM, HTN, PAD s/p atherectomy and LLE 5th digit OM s/p amputation, porphyria cutanea tarda, pA-fib (Eliquis, also hx of HIB), CAD s/p stent, MV endocarditis, Hep C, Hep B, Paget’s disease of breast s/p mastectomy, ESRD on HD (failed AVF, now using permacath), 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     #Altered mental status - unclear etiology, no source of infection, procal is WNL for HD patient.  s/p Vanc, Cefepime in ER. No abx at this time. Follow cultures Patient has been declining over last 4-6 months with worsening dysphagia, was on pureed diet now essentially NPO. SLP eval pending    #FTT - Hospice referral sent. Spoke about morphine for comfort. Seems to be what matters most for the family. They will have a discussion abt whether or not to stop HD    #Chronic A-fib with hx of GIB - daughter prefers to hold AC at this time and aware of increased stroke risk    #ESRD on HD - Nephro on board. HD today    #Leukopenia, Thrombocytopenia - monitor. labs drawn from IV line which may not be accurate. Family currently is NOT wanting blood draws unless through HD.    #DVT Prophylaxis: with Heparin    6/20, spoke to Lenore and she has questions for hospice. She states she does not want a feeding tube for the patient. 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     #Altered mental status - unclear etiology, no source of infection, procal is WNL for HD patient.  s/p Vanc, Cefepime in ER. No abx at this time. Follow cultures Patient has been declining over last 4-6 months with worsening dysphagia, was on pureed diet now essentially NPO. SLP eval pending    #FTT - Hospice referral sent. Spoke about morphine for comfort. Seems to be what matters most for the family. They will have a discussion abt whether or not to stop HD    #Chronic A-fib with hx of GIB - daughter prefers to hold AC at this time and aware of increased stroke risk    #ESRD on HD - Nephro on board. HD today    #Leukopenia, Thrombocytopenia - monitor. labs drawn from IV line which may not be accurate. Family currently is NOT wanting blood draws unless through HD.    #DVT Prophylaxis: with Heparin    6/20, spoke to Lenore and she has questions for hospice. She states she does not want a feeding tube for the patient. Awaiting S/S eval for further conversation on [facility]    ADDENDUM:  Patient unable to participate in S/S eval successfully to assess swallowing. Bedside conversation held with daughter and other family members. Discuss hospice (inpatient vs outpatient), stopping HD as well. They are awaiting to speak to hospice. Offered making the patient comfortable and offered morphine. They do not want further blood draws.

## 2023-06-20 NOTE — DIETITIAN INITIAL EVALUATION ADULT - OTHER INFO
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 Patient NPO awaiting SLP eval regarding advancement to p diet , Patient noted confused , (+1) generalized arms, (+2) LE edema, Stage II sacral wound noted , Patient was noted for pending AVR replacement however daughter no longer receptive to invasive procedure, No EN feeds , family not receptive , Stage II noted on sacrum , s/p HD (6/19) Hospice referral noted .

## 2023-06-20 NOTE — SWALLOW BEDSIDE ASSESSMENT ADULT - SWALLOW EVAL: ORAL MUSCULATURE
right sided labial asymmetry/anomalies present/unable to assess due to poor participation/comprehension

## 2023-06-20 NOTE — SWALLOW BEDSIDE ASSESSMENT ADULT - SLP PERTINENT HISTORY OF CURRENT PROBLEM
82 year old F with PMH of T2DM, HTN, PAD s/p atherectomy and LLE 5th digit OM s/p amputation, porphyria cutanea tarda, pA-fib,, CAD s/p stent, MV endocarditis, Hep C, Hep B, Paget’s disease of breast s/p mastectomy, ESRD on HD (failed AVF, now using permacath), 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 Patient unable to provide meaningful history at time of evaluation. Patient usually oriented x2-3. Has had declining health over the last 4-6 months with multiple admissions to [facility]. 2 months ago had failure of AVF, permacath placed. Over the last 2-3 weeks patient has been intermittently refusing medications and less interactive. Was able to tolerate pureed diet previously, now minimal intake for the last 2 days. This morning she was more confused, BP noted to be low and EMS activated.

## 2023-06-20 NOTE — SWALLOW BEDSIDE ASSESSMENT ADULT - SWALLOW EVAL: DIAGNOSIS
Swallow functioning remains unascertainable at the this time as patient without oral stripping of teaspoon or cup due to reduced cognitive status and poor orientation to the feeding process.  Thin liquids via teaspoon able to be placed in oral cavity however without bolus manipulation with bolus noted to spill out of right labial structures.  No attempts to strip teaspoon amounts of puree or thin liquids or liquids via single cup sips..  Furthermore, patient often with lips clasped closed in attempts to refuse teaspoon and cup sip presentations.

## 2023-06-20 NOTE — PROGRESS NOTE ADULT - SUBJECTIVE AND OBJECTIVE BOX
CC: Patient is a 82y old  Female who presents with a chief complaint of AMS (19 Jun 2023 17:27)      Interval History:  Patient seen and examined at bedside.  No overnight events  No complaints this morning    1-3 Elements + 1 ROS  Status of 3 chronic/inactive problems    ROS:  CONSTITUTIONAL: No fever, weight loss, or fatigue  RESPIRATORY: No cough, wheezing, chills or hemoptysis; No shortness of breath  CARDIOVASCULAR: No chest pain, palpitations, dizziness, or leg swelling  GASTROINTESTINAL: No abdominal or epigastric pain. No nausea, vomiting, or hematemesis; No diarrhea or constipation. No melena or hematochezia.  GENITOURINARY: No dysuria, frequency, hematuria, or incontinence  NEUROLOGICAL: No headaches, memory loss, loss of strength, numbness, or tremors    ALLERGIES:  penicillin (Unknown)  iodine (Unknown)  Venofer (Unknown)  ampicillin (Unknown)    MEDICATIONS  (STANDING):  epoetin silvia-epbx (RETACRIT) Injectable 52676 Unit(s) IV Push <User Schedule>  heparin   Injectable 5000 Unit(s) SubCutaneous every 12 hours  levETIRAcetam  IVPB 250 milliGRAM(s) IV Intermittent every 12 hours  valproate sodium  IVPB 500 milliGRAM(s) IV Intermittent two times a day    MEDICATIONS  (PRN):  acetaminophen     Tablet .. 650 milliGRAM(s) Oral every 6 hours PRN Temp greater or equal to 38C (100.4F), Mild Pain (1 - 3)  aluminum hydroxide/magnesium hydroxide/simethicone Suspension 30 milliLiter(s) Oral every 4 hours PRN Dyspepsia  melatonin 3 milliGRAM(s) Oral at bedtime PRN Insomnia  ondansetron Injectable 4 milliGRAM(s) IV Push every 8 hours PRN Nausea and/or Vomiting    Vital Signs Last 24 Hrs  T(F): 98 (19 Jun 2023 12:48), Max: 98 (19 Jun 2023 12:48)  HR: 55 (20 Jun 2023 06:10) (55 - 60)  BP: 128/80 (20 Jun 2023 06:10) (69/78 - 133/62)  RR: 12 (20 Jun 2023 06:10) (11 - 20)  SpO2: 100% (20 Jun 2023 06:10) (100% - 100%)  I&O's Summary        PHYSICAL EXAM:  GENERAL: NAD  HENT:  Atraumatic, Normocephalic; No tonsillar erythema, exudates, or enlargement; Moist mucous membranes  EYES: EOMI, PERRLA, conjunctiva and sclera clear, no lid-lag; moist conjunctivae  NECK: Supple, No JVD, Normal thyroid, FROM of neck  NERVOUS SYSTEM:  CN II - XII intact; Sensation intact; follows commands  CHEST/LUNG: Clear to percussion bilaterally; No rales, rhonchi, wheezing, or rubs; normal respiratory effort, no intercostal retractions  HEART: Regular rate and rhythm; No murmurs, rubs, or gallops; No pitting edema  ABDOMEN: Soft, Nontender, Nondistended; Bowel sounds present; No HSM or masses  MUSCULOSKELETAL/EXTREMITIES:  2+ Peripheral Pulses, No clubbing or digital cyanosis; FROM of extremeties (pain, crepitation or contracture)  PSYCH: Appropriate affect, Alert & Oriented x 3, Good Memory; Good insight    LABS:                        8.0    3.04  )-----------( 56       ( 20 Jun 2023 06:30 )             25.7       06-19    139  |  102  |  13  ----------------------------<  133  3.5   |  28  |  2.46    Ca    9.2      19 Jun 2023 14:50    TPro  5.5  /  Alb  1.6  /  TBili  0.5  /  DBili  x   /  AST  22  /  ALT  22  /  AlkPhos  97  06-19       PT/INR - ( 19 Jun 2023 14:50 )   PT: 16.3 sec;   INR: 1.40 ratio            Lactate, Blood: 0.8 mmol/L (06-19 @ 14:50)    CARDIAC MARKERS ( 19 Jun 2023 14:50 )  x     / 36.4 ng/L / x     / x     / x                                    Care Discussed with Consultants/Other Providers: Yes   CC: Patient is a 82y old  Female who presents with a chief complaint of AMS (19 Jun 2023 17:27)      Interval History:  Patient seen and examined at bedside.  No overnight events      ROS:  CONSTITUTIONAL: No fever, weight loss, or fatigue  RESPIRATORY: No cough, wheezing, chills or hemoptysis; No shortness of breath  CARDIOVASCULAR: No chest pain, palpitations, dizziness, or leg swelling    ALLERGIES:  penicillin (Unknown)  iodine (Unknown)  Venofer (Unknown)  ampicillin (Unknown)    MEDICATIONS  (STANDING):  epoetin silvia-epbx (RETACRIT) Injectable 51488 Unit(s) IV Push <User Schedule>  heparin   Injectable 5000 Unit(s) SubCutaneous every 12 hours  levETIRAcetam  IVPB 250 milliGRAM(s) IV Intermittent every 12 hours  valproate sodium  IVPB 500 milliGRAM(s) IV Intermittent two times a day    MEDICATIONS  (PRN):  acetaminophen     Tablet .. 650 milliGRAM(s) Oral every 6 hours PRN Temp greater or equal to 38C (100.4F), Mild Pain (1 - 3)  aluminum hydroxide/magnesium hydroxide/simethicone Suspension 30 milliLiter(s) Oral every 4 hours PRN Dyspepsia  melatonin 3 milliGRAM(s) Oral at bedtime PRN Insomnia  ondansetron Injectable 4 milliGRAM(s) IV Push every 8 hours PRN Nausea and/or Vomiting    Vital Signs Last 24 Hrs  T(F): 98 (19 Jun 2023 12:48), Max: 98 (19 Jun 2023 12:48)  HR: 55 (20 Jun 2023 06:10) (55 - 60)  BP: 128/80 (20 Jun 2023 06:10) (69/78 - 133/62)  RR: 12 (20 Jun 2023 06:10) (11 - 20)  SpO2: 100% (20 Jun 2023 06:10) (100% - 100%)  I&O's Summary    PHYSICAL EXAM:  GENERAL: NAD  HENT:  Atraumatic, Normocephalic; No tonsillar erythema, exudates, or enlargement; Moist mucous membranes  EYES: EOMI, PERRLA, conjunctiva and sclera clear, no lid-lag; moist conjunctivae  NECK: Supple, No JVD, Normal thyroid, FROM of neck  NERVOUS SYSTEM:  CN II - XII intact; Sensation intact; follows commands  CHEST/LUNG: Clear to percussion bilaterally; No rales, rhonchi, wheezing, or rubs; normal respiratory effort, no intercostal retractions  HEART: Regular rate and rhythm; No murmurs, rubs, or gallops; No pitting edema  ABDOMEN: Soft, Nontender, Nondistended; Bowel sounds present; No HSM or masses  MUSCULOSKELETAL/EXTREMITIES:  B/L swollen extremities  PSYCH: Appropriate affect, Alert x 1    LABS:                        8.0    3.04  )-----------( 56       ( 20 Jun 2023 06:30 )             25.7       06-19    139  |  102  |  13  ----------------------------<  133  3.5   |  28  |  2.46    Ca    9.2      19 Jun 2023 14:50    TPro  5.5  /  Alb  1.6  /  TBili  0.5  /  DBili  x   /  AST  22  /  ALT  22  /  AlkPhos  97  06-19     PT/INR - ( 19 Jun 2023 14:50 )   PT: 16.3 sec;   INR: 1.40 ratio      Lactate, Blood: 0.8 mmol/L (06-19 @ 14:50)    CARDIAC MARKERS ( 19 Jun 2023 14:50 )  x     / 36.4 ng/L / x     / x     / x        Care Discussed with Consultants/Other Providers: Yes

## 2023-06-20 NOTE — SWALLOW BEDSIDE ASSESSMENT ADULT - COMMENTS
6/19 Head CT - no acute intracranial hemorrhage or acute territorial infarction. Chronic right ZEESHAN territory infarct. Extensive chronic microvascular                              ischemic changes  6/19 CXR - no active parenchymal disease in chest    6/20 WBC - 3.04

## 2023-06-20 NOTE — DIETITIAN INITIAL EVALUATION ADULT - PERTINENT LABORATORY DATA
06-20    139  |  104  |  15  ----------------------------<  134<H>  3.4<L>   |  26  |  2.60<H>    Ca    9.2      20 Jun 2023 06:30    TPro  5.2<L>  /  Alb  1.5<L>  /  TBili  0.4  /  DBili  x   /  AST  22  /  ALT  22  /  AlkPhos  95  06-20

## 2023-06-21 ENCOUNTER — TRANSCRIPTION ENCOUNTER (OUTPATIENT)
Age: 83
End: 2023-06-21

## 2023-06-21 PROCEDURE — 99233 SBSQ HOSP IP/OBS HIGH 50: CPT

## 2023-06-21 RX ORDER — NIFEDIPINE 30 MG
1 TABLET, EXTENDED RELEASE 24 HR ORAL
Qty: 0 | Refills: 0 | DISCHARGE

## 2023-06-21 RX ORDER — HYDROMORPHONE HYDROCHLORIDE 2 MG/ML
0.5 INJECTION INTRAMUSCULAR; INTRAVENOUS; SUBCUTANEOUS
Refills: 0 | Status: DISCONTINUED | OUTPATIENT
Start: 2023-06-21 | End: 2023-06-22

## 2023-06-21 RX ORDER — ERGOCALCIFEROL 1.25 MG/1
1 CAPSULE ORAL
Qty: 0 | Refills: 0 | DISCHARGE

## 2023-06-21 RX ORDER — PARICALCITOL 2 UG/1
1 CAPSULE, GELATIN COATED ORAL
Qty: 0 | Refills: 0 | DISCHARGE

## 2023-06-21 RX ORDER — PANTOPRAZOLE SODIUM 20 MG/1
1 TABLET, DELAYED RELEASE ORAL
Qty: 0 | Refills: 0 | DISCHARGE

## 2023-06-21 RX ORDER — THIAMINE MONONITRATE (VIT B1) 100 MG
1 TABLET ORAL
Qty: 0 | Refills: 0 | DISCHARGE

## 2023-06-21 RX ORDER — METOPROLOL TARTRATE 50 MG
1 TABLET ORAL
Qty: 0 | Refills: 0 | DISCHARGE

## 2023-06-21 RX ORDER — CINACALCET 30 MG/1
1 TABLET, FILM COATED ORAL
Qty: 0 | Refills: 0 | DISCHARGE

## 2023-06-21 RX ORDER — PREGABALIN 225 MG/1
1 CAPSULE ORAL
Qty: 0 | Refills: 0 | DISCHARGE

## 2023-06-21 RX ORDER — HYDROMORPHONE HYDROCHLORIDE 2 MG/ML
0.5 INJECTION INTRAMUSCULAR; INTRAVENOUS; SUBCUTANEOUS
Qty: 0 | Refills: 0 | DISCHARGE
Start: 2023-06-21

## 2023-06-21 RX ORDER — APIXABAN 2.5 MG/1
1 TABLET, FILM COATED ORAL
Qty: 0 | Refills: 0 | DISCHARGE

## 2023-06-21 RX ADMIN — SODIUM CHLORIDE 40 MILLILITER(S): 9 INJECTION, SOLUTION INTRAVENOUS at 07:08

## 2023-06-21 RX ADMIN — LEVETIRACETAM 400 MILLIGRAM(S): 250 TABLET, FILM COATED ORAL at 18:14

## 2023-06-21 RX ADMIN — HEPARIN SODIUM 5000 UNIT(S): 5000 INJECTION INTRAVENOUS; SUBCUTANEOUS at 18:14

## 2023-06-21 RX ADMIN — HEPARIN SODIUM 5000 UNIT(S): 5000 INJECTION INTRAVENOUS; SUBCUTANEOUS at 07:09

## 2023-06-21 RX ADMIN — Medication 55 MILLIGRAM(S): at 17:50

## 2023-06-21 RX ADMIN — HYDROMORPHONE HYDROCHLORIDE 0.5 MILLIGRAM(S): 2 INJECTION INTRAMUSCULAR; INTRAVENOUS; SUBCUTANEOUS at 23:50

## 2023-06-21 RX ADMIN — HYDROMORPHONE HYDROCHLORIDE 0.5 MILLIGRAM(S): 2 INJECTION INTRAMUSCULAR; INTRAVENOUS; SUBCUTANEOUS at 21:36

## 2023-06-21 RX ADMIN — Medication 55 MILLIGRAM(S): at 07:10

## 2023-06-21 RX ADMIN — LEVETIRACETAM 400 MILLIGRAM(S): 250 TABLET, FILM COATED ORAL at 07:08

## 2023-06-21 NOTE — PROGRESS NOTE ADULT - SUBJECTIVE AND OBJECTIVE BOX
CC: Patient is a 82y old  Female who presents with a chief complaint of AMS (20 Jun 2023 17:07)      Interval History:  Patient seen and examined at bedside.  No overnight events  No complaints this morning    1-3 Elements + 1 ROS  Status of 3 chronic/inactive problems    ROS:  CONSTITUTIONAL: No fever, weight loss, or fatigue  RESPIRATORY: No cough, wheezing, chills or hemoptysis; No shortness of breath  CARDIOVASCULAR: No chest pain, palpitations, dizziness, or leg swelling  GASTROINTESTINAL: No abdominal or epigastric pain. No nausea, vomiting, or hematemesis; No diarrhea or constipation. No melena or hematochezia.  GENITOURINARY: No dysuria, frequency, hematuria, or incontinence  NEUROLOGICAL: No headaches, memory loss, loss of strength, numbness, or tremors    ALLERGIES:  penicillin (Unknown)  iodine (Unknown)  Venofer (Unknown)  penicillins (Swelling)  ampicillin (Unknown)    MEDICATIONS  (STANDING):  dextrose 5% + sodium chloride 0.9%. 1000 milliLiter(s) (40 mL/Hr) IV Continuous <Continuous>  epoetin silvia-epbx (RETACRIT) Injectable 85521 Unit(s) IV Push <User Schedule>  heparin   Injectable 5000 Unit(s) SubCutaneous every 12 hours  levETIRAcetam  IVPB 250 milliGRAM(s) IV Intermittent every 12 hours  valproate sodium  IVPB 500 milliGRAM(s) IV Intermittent two times a day    MEDICATIONS  (PRN):  acetaminophen     Tablet .. 650 milliGRAM(s) Oral every 6 hours PRN Temp greater or equal to 38C (100.4F), Mild Pain (1 - 3)  aluminum hydroxide/magnesium hydroxide/simethicone Suspension 30 milliLiter(s) Oral every 4 hours PRN Dyspepsia  melatonin 3 milliGRAM(s) Oral at bedtime PRN Insomnia  morphine  - Injectable 1 milliGRAM(s) IV Push every 2 hours PRN RR > 20  ondansetron Injectable 4 milliGRAM(s) IV Push every 8 hours PRN Nausea and/or Vomiting    Vital Signs Last 24 Hrs  T(F): 95 (20 Jun 2023 20:38), Max: 97.6 (20 Jun 2023 09:30)  HR: 55 (20 Jun 2023 20:38) (55 - 58)  BP: 103/47 (20 Jun 2023 20:38) (89/29 - 103/47)  RR: 15 (20 Jun 2023 20:38) (14 - 15)  SpO2: 98% (20 Jun 2023 20:38) (98% - 100%)  I&O's Summary    20 Jun 2023 07:01  -  21 Jun 2023 07:00  --------------------------------------------------------  IN: 300 mL / OUT: 0 mL / NET: 300 mL          PHYSICAL EXAM:  GENERAL: NAD  HENT:  Atraumatic, Normocephalic; No tonsillar erythema, exudates, or enlargement; Moist mucous membranes  EYES: EOMI, PERRLA, conjunctiva and sclera clear, no lid-lag; moist conjunctivae  NECK: Supple, No JVD, Normal thyroid, FROM of neck  NERVOUS SYSTEM:  CN II - XII intact; Sensation intact; follows commands  CHEST/LUNG: Clear to percussion bilaterally; No rales, rhonchi, wheezing, or rubs; normal respiratory effort, no intercostal retractions  HEART: Regular rate and rhythm; No murmurs, rubs, or gallops; No pitting edema  ABDOMEN: Soft, Nontender, Nondistended; Bowel sounds present; No HSM or masses  MUSCULOSKELETAL/EXTREMITIES:  2+ Peripheral Pulses, No clubbing or digital cyanosis; FROM of extremeties (pain, crepitation or contracture)  PSYCH: Appropriate affect, Alert & Oriented x 3, Good Memory; Good insight    LABS:                        8.0    3.04  )-----------( 56       ( 20 Jun 2023 06:30 )             25.7       06-20    139  |  104  |  15  ----------------------------<  134  3.4   |  26  |  2.60    Ca    9.2      20 Jun 2023 06:30    TPro  5.2  /  Alb  1.5  /  TBili  0.4  /  DBili  x   /  AST  22  /  ALT  22  /  AlkPhos  95  06-20       PT/INR - ( 19 Jun 2023 14:50 )   PT: 16.3 sec;   INR: 1.40 ratio            Lactate, Blood: 0.8 mmol/L (06-19 @ 14:50)    CARDIAC MARKERS ( 19 Jun 2023 14:50 )  x     / 36.4 ng/L / x     / x     / x                                    Care Discussed with Consultants/Other Providers: Yes   CC: Patient is a 82y old  Female who presents with a chief complaint of AMS (20 Jun 2023 17:07)      Interval History:  Patient seen and examined at bedside.  No overnight events  Arousable    ROS:  CONSTITUTIONAL: No fever, weight loss, or fatigue  RESPIRATORY: No cough, wheezing, chills or hemoptysis; No shortness of breath  CARDIOVASCULAR: No chest pain, palpitations, dizziness, or leg swelling    ALLERGIES:  penicillin (Unknown)  iodine (Unknown)  Venofer (Unknown)  penicillins (Swelling)  ampicillin (Unknown)    MEDICATIONS  (STANDING):  dextrose 5% + sodium chloride 0.9%. 1000 milliLiter(s) (40 mL/Hr) IV Continuous <Continuous>  epoetin silvia-epbx (RETACRIT) Injectable 96040 Unit(s) IV Push <User Schedule>  heparin   Injectable 5000 Unit(s) SubCutaneous every 12 hours  levETIRAcetam  IVPB 250 milliGRAM(s) IV Intermittent every 12 hours  valproate sodium  IVPB 500 milliGRAM(s) IV Intermittent two times a day    MEDICATIONS  (PRN):  acetaminophen     Tablet .. 650 milliGRAM(s) Oral every 6 hours PRN Temp greater or equal to 38C (100.4F), Mild Pain (1 - 3)  aluminum hydroxide/magnesium hydroxide/simethicone Suspension 30 milliLiter(s) Oral every 4 hours PRN Dyspepsia  melatonin 3 milliGRAM(s) Oral at bedtime PRN Insomnia  morphine  - Injectable 1 milliGRAM(s) IV Push every 2 hours PRN RR > 20  ondansetron Injectable 4 milliGRAM(s) IV Push every 8 hours PRN Nausea and/or Vomiting    Vital Signs Last 24 Hrs  T(F): 95 (20 Jun 2023 20:38), Max: 97.6 (20 Jun 2023 09:30)  HR: 55 (20 Jun 2023 20:38) (55 - 58)  BP: 103/47 (20 Jun 2023 20:38) (89/29 - 103/47)  RR: 15 (20 Jun 2023 20:38) (14 - 15)  SpO2: 98% (20 Jun 2023 20:38) (98% - 100%)  I&O's Summary    20 Jun 2023 07:01  -  21 Jun 2023 07:00  --------------------------------------------------------  IN: 300 mL / OUT: 0 mL / NET: 300 mL    PHYSICAL EXAM:  GENERAL: NAD  HENT:  Atraumatic, Normocephalic; No tonsillar erythema, exudates, or enlargement; Moist mucous membranes  EYES: EOMI, PERRLA, conjunctiva and sclera clear, no lid-lag; moist conjunctivae  NECK: Supple, No JVD, Normal thyroid, FROM of neck  NERVOUS SYSTEM:  CN II - XII intact; Sensation intact; follows commands  CHEST/LUNG: Clear to percussion bilaterally; No rales, rhonchi, wheezing, or rubs; normal respiratory effort, no intercostal retractions  HEART: Regular rate and rhythm; No murmurs, rubs, or gallops; No pitting edema  ABDOMEN: Soft, Nontender, Nondistended; Bowel sounds present; No HSM or masses  MUSCULOSKELETAL/EXTREMITIES:  2+ Peripheral Pulses, No clubbing or digital cyanosis  PSYCH: Appropriate affect, Alert x 0-1  LABS:                        8.0    3.04  )-----------( 56       ( 20 Jun 2023 06:30 )             25.7       06-20    139  |  104  |  15  ----------------------------<  134  3.4   |  26  |  2.60    Ca    9.2      20 Jun 2023 06:30    TPro  5.2  /  Alb  1.5  /  TBili  0.4  /  DBili  x   /  AST  22  /  ALT  22  /  AlkPhos  95  06-20     PT/INR - ( 19 Jun 2023 14:50 )   PT: 16.3 sec;   INR: 1.40 ratio       Lactate, Blood: 0.8 mmol/L (06-19 @ 14:50)    CARDIAC MARKERS ( 19 Jun 2023 14:50 )  x     / 36.4 ng/L / x     / x     / x        Care Discussed with Consultants/Other Providers: Yes

## 2023-06-21 NOTE — DISCHARGE NOTE PROVIDER - NSDCCPCAREPLAN_GEN_ALL_CORE_FT
PRINCIPAL DISCHARGE DIAGNOSIS  Diagnosis: Altered mental status  Assessment and Plan of Treatment: Accepted to Hospice

## 2023-06-21 NOTE — PROGRESS NOTE ADULT - ASSESSMENT
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     #Altered mental status - unclear etiology, no source of infection, procal is WNL for HD patient.  s/p Vanc, Cefepime in ER. No abx at this time. Follow cultures Patient has been declining over last 4-6 months with worsening dysphagia, was on pureed diet now essentially NPO. SLP eval pending    #FTT - Hospice referral sent. Spoke about morphine for comfort. Seems to be what matters most for the family. They will have a discussion abt whether or not to stop HD    #Chronic A-fib with hx of GIB - daughter prefers to hold AC at this time and aware of increased stroke risk    #ESRD on HD - Nephro on board. HD today    #Leukopenia, Thrombocytopenia - monitor. labs drawn from IV line which may not be accurate. Family currently is NOT wanting blood draws unless through HD.    #DVT Prophylaxis: with Heparin    6/20, spoke to Lenore and she has questions for hospice. She states she does not want a feeding tube for the patient. Awaiting S/S eval for further conversation on [facility]    ADDENDUM:  Patient unable to participate in S/S eval successfully to assess swallowing. Bedside conversation held with daughter and other family members. Discuss hospice (inpatient vs outpatient), stopping HD as well. They are awaiting to speak to hospice. Offered making the patient comfortable and offered morphine. They do not want further blood draws.   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     #Altered mental status - unclear etiology, no source of infection, procal is WNL for HD patient.  s/p Vanc, Cefepime in ER. No further abx at this time. Patient has been declining over last 4-6 months with worsening dysphagia, was on pureed diet now essentially NPO.   Patient unable to participate in S/S eval successfully to assess swallowing. Bedside conversation held with daughter and other family members. Discuss hospice (inpatient vs outpatient), stopping HD as well. They are awaiting to speak to hospice. Offered making the patient comfortable and offered morphine. They do not want further blood draws.      #FTT - Hospice referral sent. Spoke about morphine for comfort. Seems to be what matters most for the family. They will have a discussion abt whether or not to stop HD    #Chronic A-fib with hx of GIB - daughter prefers to hold AC at this time and aware of increased stroke risk    #ESRD on HD - Nephro on board. HD tomorrow unless family refuses further HD    #Leukopenia, Thrombocytopenia - no further labs    #DVT Prophylaxis: with heparin    6/20, spoke to Lenore and she has questions for hospice. She states she does not want a feeding tube for the patient. Awaiting S/S eval for further conversation on [facility]    6/21: Patient's daughter is on the fence with HD as she feels as if she is stopping treatment on her mother. She is coming to the hospital. I will speak to her further about hospice.     Lenore [phone] 82 year old F with PMH of T2DM, HTN, PAD s/p atherectomy and LLE 5th digit OM s/p amputation, porphyria cutanea tarda, pA-fib (Eliquis, also hx of HIB), CAD s/p stent, MV endocarditis, Hep C, Hep B, Paget’s disease of breast s/p mastectomy, ESRD on HD (failed AVF, now using permacath), 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     #Altered mental status - unclear etiology, no source of infection, procal is WNL for HD patient.  s/p Vanc, Cefepime in ER. No further abx at this time. Patient has been declining over last 4-6 months with worsening dysphagia, was on pureed diet now essentially NPO.   Patient unable to participate in S/S eval successfully to assess swallowing. Bedside conversation held with daughter and other family members. Discuss hospice (inpatient vs outpatient), stopping HD as well. They are awaiting to speak to hospice. Offered making the patient comfortable and offered morphine. They do not want further blood draws.      #FTT - Hospice referral sent. Spoke about morphine for comfort. Seems to be what matters most for the family. They will have a discussion abt whether or not to stop HD    #Chronic A-fib with hx of GIB - daughter prefers to hold AC at this time and aware of increased stroke risk    #ESRD on HD - Nephro on board. HD tomorrow unless family refuses further HD    #Leukopenia, Thrombocytopenia - no further labs    #DVT Prophylaxis: with heparin    6/20, spoke to Lenore and she has questions for hospice. She states she does not want a feeding tube for the patient. Awaiting S/S eval for further conversation on [facility]    6/21: Patient's daughter is on the fence with HD as she feels as if she is stopping treatment on her mother. She is coming to the hospital. I will speak to her further about hospice.   Spoke with Hospice [facility] since patient cannot tolerate PO. She has been accepted but there is no bed currently. Likely will have a bed tomorrow. Attempted to call lenore but it went to . Hold heparin. No VS.  Patient to be discharged with PICC line as per hospice.   Lenore [phone]

## 2023-06-21 NOTE — PROGRESS NOTE ADULT - SUBJECTIVE AND OBJECTIVE BOX
Resting, on NC O2    Vital Signs Last 24 Hrs  T(C): 33.2 (06-21-23 @ 18:10), Max: 33.2 (06-21-23 @ 18:10)  T(F): 91.7 (06-21-23 @ 18:10), Max: 91.7 (06-21-23 @ 18:10)  HR: 72 (06-21-23 @ 18:10) (66 - 72)  BP: 82/47 (06-21-23 @ 18:10) (82/47 - 113/54)  RR: 14 (06-21-23 @ 18:10) (10 - 14)  SpO2: 99% (06-21-23 @ 18:10) (99% - 100%)    s1s2  b/l air entry  soft, ND  tr edema                        8.0    3.04  )-----------( 56       ( 20 Jun 2023 06:30 )             25.7     20 Jun 2023 06:30    139    |  104    |  15     ----------------------------<  134    3.4     |  26     |  2.60     Ca    9.2        20 Jun 2023 06:30    TPro  5.2    /  Alb  1.5    /  TBili  0.4    /  DBili  x      /  AST  22     /  ALT  22     /  AlkPhos  95     20 Jun 2023 06:30    LIVER FUNCTIONS - ( 20 Jun 2023 06:30 )  Alb: 1.5 g/dL / Pro: 5.2 g/dL / ALK PHOS: 95 U/L / ALT: 22 U/L / AST: 22 U/L / GGT: x           acetaminophen     Tablet .. 650 milliGRAM(s) Oral every 6 hours PRN  aluminum hydroxide/magnesium hydroxide/simethicone Suspension 30 milliLiter(s) Oral every 4 hours PRN  epoetin silvia-epbx (RETACRIT) Injectable 08380 Unit(s) IV Push <User Schedule>  HYDROmorphone  Injectable 0.5 milliGRAM(s) IV Push every 2 hours PRN  levETIRAcetam  IVPB 250 milliGRAM(s) IV Intermittent every 12 hours  melatonin 3 milliGRAM(s) Oral at bedtime PRN  ondansetron Injectable 4 milliGRAM(s) IV Push every 8 hours PRN  valproate sodium  IVPB 500 milliGRAM(s) IV Intermittent two times a day    A/P:    Hx CVA, DM, ESRD on HD  Pt has been declining last few months, sent from [facility] w/AMS, FTT, pancytopenia   NPO per Sp and Sw recommendations  Prognosis is poor overall  Plans for in-pt hospice noted  Most appropriate    [phone]

## 2023-06-21 NOTE — DISCHARGE NOTE PROVIDER - HOSPITAL COURSE
Hospital Course  HPI:  82 year old F with PMH of T2DM, HTN, PAD s/p atherectomy and LLE 5th digit OM s/p amputation, porphyria cutanea tarda, pA-fib (Eliquis, also hx of HIB), CAD s/p stent, MV endocarditis, Hep C, Hep B, Paget’s disease of breast s/p mastectomy, ESRD on HD (failed AVF, now using permacath), 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     Patient unable to provide meaningful history at time of evaluation. Patient usually oriented x2-3. Has had declining health over the last 4-6 months with multiple admissions to [facility]. 2 months ago had failure of AVF, permacath placed. Over the last 2-3 weeks patient has been intermittently refusing medications and less interactive. Was able to tolerate pureed diet previously, now minimal intake for the last 2 days.   This morning she was more confused, BP noted to be low and EMS activated.   Of note, patient had planned vascular intervention to repair AVF, has been receiving Vancomycin with HD in preparation for procedure per Vascular's recommendation. Daughter at bedside does not wish for more interventions.     On ER arrival, T 98, /62, HR 56, RR 20, sat 100% on 6L NC. Given 500cc NS, Vanc and Cefepime in ER (19 Jun 2023 17:27)    There were no other signs of infectious process so no further Abx were given. Extensive conversation about goals of care with daughter, myself, and Hospice liaison. Family has chosen to stop HD and focus on patient comfort. No labs and vital signs. Patient was not able to participate in speech/swallow eval therefore, unable to give any meds by mouth. PICC line to be sent with patient to Hospice ( In RUE).     You were admitted for lethargy  You were diagnosed with FTT    You will need to follow up with your primary care physician.    Discharging Provider:  Alicia Denney MD  Contact Info: Cell [phone] - Please call with any questions or concerns.    Outpatient Provider:       Hospital Course  HPI:  82 year old F with PMH of T2DM, HTN, PAD s/p atherectomy and LLE 5th digit OM s/p amputation, porphyria cutanea tarda, pA-fib (Eliquis, also hx of HIB), CAD s/p stent, MV endocarditis, Hep C, Hep B, Paget’s disease of breast s/p mastectomy, ESRD on HD (failed AVF, now using permacath), 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     Patient unable to provide meaningful history at time of evaluation. Patient usually oriented x2-3. Has had declining health over the last 4-6 months with multiple admissions to [facility]. 2 months ago had failure of AVF, permacath placed. Over the last 2-3 weeks patient has been intermittently refusing medications and less interactive. Was able to tolerate pureed diet previously, now minimal intake for the last 2 days.   This morning she was more confused, BP noted to be low and EMS activated.   Of note, patient had planned vascular intervention to repair AVF, has been receiving Vancomycin with HD in preparation for procedure per Vascular's recommendation. Daughter at bedside does not wish for more interventions.     On ER arrival, T 98, /62, HR 56, RR 20, sat 100% on 6L NC. Given 500cc NS, Vanc and Cefepime in ER (19 Jun 2023 17:27)    There were no other signs of infectious process so no further Abx were given. Extensive conversation about goals of care with daughter, hospitalist, and Hospice liaison. Family has chosen to stop HD and focus on patient comfort. No labs and vital signs. Patient was not able to participate in speech/swallow eval therefore, unable to give any meds by mouth. PICC line to be sent with patient to Hospice ( In RUE).     You were admitted for lethargy  You were diagnosed with FTT    You will need to follow up with your primary care physician.    Discharging Provider:  Alicia Denney MD  Contact Info: Cell [phone] - Please call with any questions or concerns.

## 2023-06-22 ENCOUNTER — TRANSCRIPTION ENCOUNTER (OUTPATIENT)
Age: 83
End: 2023-06-22

## 2023-06-22 VITALS — RESPIRATION RATE: 24 BRPM | TEMPERATURE: 98 F | HEART RATE: 62 BPM

## 2023-06-22 PROCEDURE — 86706 HEP B SURFACE ANTIBODY: CPT

## 2023-06-22 PROCEDURE — 36415 COLL VENOUS BLD VENIPUNCTURE: CPT

## 2023-06-22 PROCEDURE — 84145 PROCALCITONIN (PCT): CPT

## 2023-06-22 PROCEDURE — 70450 CT HEAD/BRAIN W/O DYE: CPT | Mod: MB

## 2023-06-22 PROCEDURE — 85610 PROTHROMBIN TIME: CPT

## 2023-06-22 PROCEDURE — 87521 HEPATITIS C PROBE&RVRS TRNSC: CPT

## 2023-06-22 PROCEDURE — 83605 ASSAY OF LACTIC ACID: CPT

## 2023-06-22 PROCEDURE — 99261: CPT

## 2023-06-22 PROCEDURE — 92610 EVALUATE SWALLOWING FUNCTION: CPT

## 2023-06-22 PROCEDURE — 99239 HOSP IP/OBS DSCHRG MGMT >30: CPT

## 2023-06-22 PROCEDURE — 71045 X-RAY EXAM CHEST 1 VIEW: CPT

## 2023-06-22 PROCEDURE — 80074 ACUTE HEPATITIS PANEL: CPT

## 2023-06-22 PROCEDURE — 85025 COMPLETE CBC W/AUTO DIFF WBC: CPT

## 2023-06-22 PROCEDURE — 85027 COMPLETE CBC AUTOMATED: CPT

## 2023-06-22 PROCEDURE — 80053 COMPREHEN METABOLIC PANEL: CPT

## 2023-06-22 PROCEDURE — 84484 ASSAY OF TROPONIN QUANT: CPT

## 2023-06-22 PROCEDURE — 99285 EMERGENCY DEPT VISIT HI MDM: CPT

## 2023-06-22 RX ADMIN — LEVETIRACETAM 400 MILLIGRAM(S): 250 TABLET, FILM COATED ORAL at 06:29

## 2023-06-22 RX ADMIN — Medication 55 MILLIGRAM(S): at 06:42

## 2023-06-22 NOTE — PROGRESS NOTE ADULT - ASSESSMENT
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     #Altered mental status - unclear etiology, no source of infection, procal is WNL for HD patient.  s/p Vanc, Cefepime in ER. No further abx at this time. Patient has been declining over last 4-6 months with worsening dysphagia, was on pureed diet now essentially NPO.   Patient unable to participate in S/S eval successfully to assess swallowing. Bedside conversations held with daughter and other family members. Discuss hospice (inpatient vs outpatient), stopping HD as well.   Family is going to discharge to inpatient hospice. They do not want further blood draws.  They would like as needed pain medications for comfort.     #FTT - Hospice referral sent. Spoke about morphine for comfort. Seems to be what matters most for the family. They will have a discussion abt whether or not to stop HD    #Chronic A-fib with hx of GIB - daughter prefers to hold AC at this time and aware of increased stroke risk    #ESRD on HD - Nephro on board.  Agrees with CMO.    #Leukopenia, Thrombocytopenia - no further labs    #DVT Prophylaxis: with heparin    6/20, spoke to Lenore and she has questions for hospice. She states she does not want a feeding tube for the patient. Awaiting S/S eval for further conversation on [facility]    6/21: Patient's daughter is on the fence with HD as she feels as if she is stopping treatment on her mother. She is coming to the hospital. I will speak to her further about hospice.   Spoke with Hospice [facility] since patient cannot tolerate PO. She has been accepted but there is no bed currently. Likely will have a bed tomorrow. Attempted to call lenore but it went to . Hold heparin. No VS.  Patient to be discharged with PICC line as per hospice.   Lenore [phone]    6/22: Lenore is agreeable to inpatient hospice.

## 2023-06-22 NOTE — DISCHARGE NOTE NURSING/CASE MANAGEMENT/SOCIAL WORK - NSDCPEFALRISK_GEN_ALL_CORE
For information on Fall & Injury Prevention, visit: [email or web address] OR  [email or web address] OR  https://www.cdc.gov/steadi/patient.html

## 2023-06-22 NOTE — PROGRESS NOTE ADULT - SUBJECTIVE AND OBJECTIVE BOX
Patient is a 82y old  Female who presents with a chief complaint of AMS (21 Jun 2023 23:33)      24 HOUR EVENTS:  No overnight events reported.     SUBJECTIVE:  Patient seen and examined at bedside. does not offer complaints.     ALLERGIES:  penicillin (Unknown)  iodine (Unknown)  Venofer (Unknown)  penicillins (Swelling)  ampicillin (Unknown)    MEDICATIONS  (STANDING):  epoetin silvia-epbx (RETACRIT) Injectable 85083 Unit(s) IV Push <User Schedule>  levETIRAcetam  IVPB 250 milliGRAM(s) IV Intermittent every 12 hours  valproate sodium  IVPB 500 milliGRAM(s) IV Intermittent two times a day    MEDICATIONS  (PRN):  acetaminophen     Tablet .. 650 milliGRAM(s) Oral every 6 hours PRN Temp greater or equal to 38C (100.4F), Mild Pain (1 - 3)  aluminum hydroxide/magnesium hydroxide/simethicone Suspension 30 milliLiter(s) Oral every 4 hours PRN Dyspepsia  HYDROmorphone  Injectable 0.5 milliGRAM(s) IV Push every 2 hours PRN Mild Pain (1 - 3)  melatonin 3 milliGRAM(s) Oral at bedtime PRN Insomnia  ondansetron Injectable 4 milliGRAM(s) IV Push every 8 hours PRN Nausea and/or Vomiting    Vital Signs Last 24 Hrs  T(F): 98 (22 Jun 2023 06:54), Max: 98 (22 Jun 2023 06:54)  HR: 62 (22 Jun 2023 06:54) (58 - 72)  BP: 82/47 (21 Jun 2023 18:10) (82/47 - 82/47)  RR: 24 (22 Jun 2023 06:54) (14 - 24)  SpO2: 99% (21 Jun 2023 18:10) (99% - 99%)  I&O's Summary    PHYSICAL EXAM:  General: NAD, Asleep, arousable, peaceful.  ENT: Moist mucous membranes, no thrush  Neck: Supple, No JVD  Lungs: Clear to auscultation bilaterally, good air entry, non-labored breathing  Cardio: RRR, S1/S2, No murmur  Abdomen: Soft, Nontender, Nondistended; Bowel sounds present  Extremities: No calf tenderness, No pitting edema, no contractures.    LABS:                        8.0    3.04  )-----------( 56       ( 20 Jun 2023 06:30 )             25.7     06-20    139  |  104  |  15  ----------------------------<  134  3.4   |  26  |  2.60    Ca    9.2      20 Jun 2023 06:30    TPro  5.2  /  Alb  1.5  /  TBili  0.4  /  DBili  x   /  AST  22  /  ALT  22  /  AlkPhos  95  06-20    PT/INR - ( 19 Jun 2023 14:50 )   PT: 16.3 sec;   INR: 1.40 ratio        Lactate, Blood: 0.8 mmol/L (06-19 @ 14:50)    Procalcitonin, Serum: 0.51 ng/mL (06-20-23 @ 06:30)    CARDIAC MARKERS ( 19 Jun 2023 14:50 )  x     / 36.4 ng/L / x     / x     / x                                  RADIOLOGY & ADDITIONAL TESTS:    Care Discussed with Consultants/Other Providers:

## 2023-06-22 NOTE — DISCHARGE NOTE NURSING/CASE MANAGEMENT/SOCIAL WORK - PATIENT PORTAL LINK FT
You can access the FollowMyHealth Patient Portal offered by [facility] by registering at the following website: [email or web address]. By joining [employer]’s FollowMyHealth portal, you will also be able to view your health information using other applications (apps) compatible with our system.

## 2023-10-28 NOTE — ASSESSMENT
Itching [FreeTextEntry1] : Imp:\par Normal physical examination. No suspicious lesions.\par \par Plan:\par Mammogram and Sonogram in June\par Continue yearly surveillance\par \par \par